# Patient Record
Sex: FEMALE | Race: WHITE | Employment: OTHER | URBAN - NONMETROPOLITAN AREA
[De-identification: names, ages, dates, MRNs, and addresses within clinical notes are randomized per-mention and may not be internally consistent; named-entity substitution may affect disease eponyms.]

---

## 2018-03-12 ENCOUNTER — OFFICE VISIT (OUTPATIENT)
Dept: PAIN MANAGEMENT | Age: 53
End: 2018-03-12
Payer: MEDICARE

## 2018-03-12 VITALS
HEIGHT: 63 IN | WEIGHT: 149 LBS | DIASTOLIC BLOOD PRESSURE: 90 MMHG | SYSTOLIC BLOOD PRESSURE: 132 MMHG | HEART RATE: 84 BPM | BODY MASS INDEX: 26.4 KG/M2

## 2018-03-12 DIAGNOSIS — M54.6 CHRONIC BILATERAL THORACIC BACK PAIN: ICD-10-CM

## 2018-03-12 DIAGNOSIS — G89.29 CHRONIC BILATERAL THORACIC BACK PAIN: ICD-10-CM

## 2018-03-12 DIAGNOSIS — M54.12 CERVICAL RADICULOPATHY: ICD-10-CM

## 2018-03-12 DIAGNOSIS — M79.18 MYOFASCIAL PAIN: ICD-10-CM

## 2018-03-12 DIAGNOSIS — M47.812 CERVICAL FACET SYNDROME: Primary | ICD-10-CM

## 2018-03-12 DIAGNOSIS — M47.816 LUMBAR FACET JOINT SYNDROME: ICD-10-CM

## 2018-03-12 PROCEDURE — G8419 CALC BMI OUT NRM PARAM NOF/U: HCPCS | Performed by: PHYSICAL MEDICINE & REHABILITATION

## 2018-03-12 PROCEDURE — 4004F PT TOBACCO SCREEN RCVD TLK: CPT | Performed by: PHYSICAL MEDICINE & REHABILITATION

## 2018-03-12 PROCEDURE — 99204 OFFICE O/P NEW MOD 45 MIN: CPT | Performed by: PHYSICAL MEDICINE & REHABILITATION

## 2018-03-12 PROCEDURE — 3017F COLORECTAL CA SCREEN DOC REV: CPT | Performed by: PHYSICAL MEDICINE & REHABILITATION

## 2018-03-12 PROCEDURE — G8484 FLU IMMUNIZE NO ADMIN: HCPCS | Performed by: PHYSICAL MEDICINE & REHABILITATION

## 2018-03-12 PROCEDURE — G8427 DOCREV CUR MEDS BY ELIG CLIN: HCPCS | Performed by: PHYSICAL MEDICINE & REHABILITATION

## 2018-03-12 PROCEDURE — 3014F SCREEN MAMMO DOC REV: CPT | Performed by: PHYSICAL MEDICINE & REHABILITATION

## 2018-03-12 RX ORDER — MELOXICAM 15 MG/1
15 TABLET ORAL
COMMUNITY
End: 2018-06-11

## 2018-03-12 RX ORDER — BUSPIRONE HYDROCHLORIDE 10 MG/1
15 TABLET ORAL 3 TIMES DAILY
COMMUNITY

## 2018-03-12 RX ORDER — TRAZODONE HYDROCHLORIDE 100 MG/1
100 TABLET ORAL
COMMUNITY
End: 2021-04-06

## 2018-03-12 RX ORDER — IBUPROFEN 200 MG
400 TABLET ORAL DAILY
COMMUNITY
End: 2018-06-11 | Stop reason: DRUGHIGH

## 2018-03-12 RX ORDER — AMITRIPTYLINE HYDROCHLORIDE 10 MG/1
10 TABLET, FILM COATED ORAL
COMMUNITY
Start: 2017-12-12 | End: 2018-05-15 | Stop reason: CLARIF

## 2018-03-12 RX ORDER — DESVENLAFAXINE 100 MG/1
100 TABLET, EXTENDED RELEASE ORAL
COMMUNITY
End: 2018-04-12

## 2018-03-12 RX ORDER — HYDROCODONE BITARTRATE AND ACETAMINOPHEN 5; 325 MG/1; MG/1
1 TABLET ORAL
COMMUNITY
Start: 2018-01-17 | End: 2018-04-12 | Stop reason: ALTCHOICE

## 2018-03-12 RX ORDER — TIZANIDINE 4 MG/1
4 TABLET ORAL
COMMUNITY
End: 2018-08-10

## 2018-03-21 ENCOUNTER — TELEPHONE (OUTPATIENT)
Dept: PAIN MANAGEMENT | Age: 53
End: 2018-03-21

## 2018-04-12 ENCOUNTER — OFFICE VISIT (OUTPATIENT)
Dept: PAIN MANAGEMENT | Age: 53
End: 2018-04-12
Payer: MEDICARE

## 2018-04-12 VITALS
HEART RATE: 88 BPM | WEIGHT: 150.8 LBS | HEIGHT: 63 IN | SYSTOLIC BLOOD PRESSURE: 136 MMHG | BODY MASS INDEX: 26.72 KG/M2 | DIASTOLIC BLOOD PRESSURE: 86 MMHG

## 2018-04-12 DIAGNOSIS — M79.7 FIBROMYALGIA: Primary | ICD-10-CM

## 2018-04-12 DIAGNOSIS — M79.18 MYOFASCIAL PAIN: ICD-10-CM

## 2018-04-12 DIAGNOSIS — G89.29 ENCOUNTER FOR CHRONIC PAIN MANAGEMENT: ICD-10-CM

## 2018-04-12 PROCEDURE — 3017F COLORECTAL CA SCREEN DOC REV: CPT | Performed by: NURSE PRACTITIONER

## 2018-04-12 PROCEDURE — G8419 CALC BMI OUT NRM PARAM NOF/U: HCPCS | Performed by: NURSE PRACTITIONER

## 2018-04-12 PROCEDURE — 4004F PT TOBACCO SCREEN RCVD TLK: CPT | Performed by: NURSE PRACTITIONER

## 2018-04-12 PROCEDURE — 3014F SCREEN MAMMO DOC REV: CPT | Performed by: NURSE PRACTITIONER

## 2018-04-12 PROCEDURE — 99214 OFFICE O/P EST MOD 30 MIN: CPT | Performed by: NURSE PRACTITIONER

## 2018-04-12 PROCEDURE — G8427 DOCREV CUR MEDS BY ELIG CLIN: HCPCS | Performed by: NURSE PRACTITIONER

## 2018-04-12 RX ORDER — DULOXETIN HYDROCHLORIDE 30 MG/1
30 CAPSULE, DELAYED RELEASE ORAL DAILY
Qty: 7 CAPSULE | Refills: 0 | Status: SHIPPED | OUTPATIENT
Start: 2018-04-12 | End: 2018-05-15 | Stop reason: DRUGHIGH

## 2018-04-12 RX ORDER — HYDROCODONE BITARTRATE AND ACETAMINOPHEN 5; 325 MG/1; MG/1
1 TABLET ORAL 2 TIMES DAILY PRN
Qty: 60 TABLET | Refills: 0 | Status: SHIPPED | OUTPATIENT
Start: 2018-04-12 | End: 2018-05-15 | Stop reason: SDUPTHER

## 2018-04-12 RX ORDER — DULOXETIN HYDROCHLORIDE 60 MG/1
60 CAPSULE, DELAYED RELEASE ORAL DAILY
Qty: 90 CAPSULE | Refills: 0 | Status: SHIPPED | OUTPATIENT
Start: 2018-04-12 | End: 2018-05-15

## 2018-04-12 ASSESSMENT — ENCOUNTER SYMPTOMS: RESPIRATORY NEGATIVE: 1

## 2018-04-17 ENCOUNTER — TELEPHONE (OUTPATIENT)
Dept: PAIN MANAGEMENT | Age: 53
End: 2018-04-17

## 2018-05-15 ENCOUNTER — OFFICE VISIT (OUTPATIENT)
Dept: PAIN MANAGEMENT | Age: 53
End: 2018-05-15
Payer: MEDICARE

## 2018-05-15 VITALS
HEIGHT: 63 IN | WEIGHT: 150.2 LBS | RESPIRATION RATE: 16 BRPM | SYSTOLIC BLOOD PRESSURE: 130 MMHG | BODY MASS INDEX: 26.61 KG/M2 | DIASTOLIC BLOOD PRESSURE: 80 MMHG

## 2018-05-15 DIAGNOSIS — M79.18 MYOFASCIAL PAIN: ICD-10-CM

## 2018-05-15 DIAGNOSIS — G89.29 ENCOUNTER FOR CHRONIC PAIN MANAGEMENT: ICD-10-CM

## 2018-05-15 DIAGNOSIS — M79.7 FIBROMYALGIA: Primary | ICD-10-CM

## 2018-05-15 PROCEDURE — G8431 POS CLIN DEPRES SCRN F/U DOC: HCPCS | Performed by: NURSE PRACTITIONER

## 2018-05-15 PROCEDURE — 4004F PT TOBACCO SCREEN RCVD TLK: CPT | Performed by: NURSE PRACTITIONER

## 2018-05-15 PROCEDURE — 3017F COLORECTAL CA SCREEN DOC REV: CPT | Performed by: NURSE PRACTITIONER

## 2018-05-15 PROCEDURE — G8419 CALC BMI OUT NRM PARAM NOF/U: HCPCS | Performed by: NURSE PRACTITIONER

## 2018-05-15 PROCEDURE — 99214 OFFICE O/P EST MOD 30 MIN: CPT | Performed by: NURSE PRACTITIONER

## 2018-05-15 PROCEDURE — G0444 DEPRESSION SCREEN ANNUAL: HCPCS | Performed by: NURSE PRACTITIONER

## 2018-05-15 PROCEDURE — G8428 CUR MEDS NOT DOCUMENT: HCPCS | Performed by: NURSE PRACTITIONER

## 2018-05-15 RX ORDER — POLYMYXIN B SULFATE AND TRIMETHOPRIM 1; 10000 MG/ML; [USP'U]/ML
SOLUTION OPHTHALMIC
COMMUNITY
Start: 2018-04-26 | End: 2018-06-04 | Stop reason: ALTCHOICE

## 2018-05-15 RX ORDER — HYDROCODONE BITARTRATE AND ACETAMINOPHEN 5; 325 MG/1; MG/1
1 TABLET ORAL EVERY 8 HOURS PRN
Qty: 90 TABLET | Refills: 0 | Status: SHIPPED | OUTPATIENT
Start: 2018-05-15 | End: 2018-06-11 | Stop reason: SDUPTHER

## 2018-05-15 RX ORDER — FLUTICASONE PROPIONATE 50 MCG
SPRAY, SUSPENSION (ML) NASAL
COMMUNITY
Start: 2018-04-26 | End: 2018-08-10 | Stop reason: ALTCHOICE

## 2018-05-15 RX ORDER — DULOXETIN HYDROCHLORIDE 30 MG/1
30 CAPSULE, DELAYED RELEASE ORAL DAILY
Qty: 14 CAPSULE | Refills: 0 | Status: SHIPPED | OUTPATIENT
Start: 2018-05-15 | End: 2018-06-04 | Stop reason: ALTCHOICE

## 2018-05-15 RX ORDER — ACETAMINOPHEN 500 MG
1000 TABLET ORAL
COMMUNITY
End: 2018-08-10 | Stop reason: ALTCHOICE

## 2018-05-15 ASSESSMENT — ENCOUNTER SYMPTOMS: RESPIRATORY NEGATIVE: 1

## 2018-05-15 ASSESSMENT — PATIENT HEALTH QUESTIONNAIRE - PHQ9
4. FEELING TIRED OR HAVING LITTLE ENERGY: 3
SUM OF ALL RESPONSES TO PHQ QUESTIONS 1-9: 15
SUM OF ALL RESPONSES TO PHQ9 QUESTIONS 1 & 2: 5
8. MOVING OR SPEAKING SO SLOWLY THAT OTHER PEOPLE COULD HAVE NOTICED. OR THE OPPOSITE, BEING SO FIGETY OR RESTLESS THAT YOU HAVE BEEN MOVING AROUND A LOT MORE THAN USUAL: 0
10. IF YOU CHECKED OFF ANY PROBLEMS, HOW DIFFICULT HAVE THESE PROBLEMS MADE IT FOR YOU TO DO YOUR WORK, TAKE CARE OF THINGS AT HOME, OR GET ALONG WITH OTHER PEOPLE: 0
7. TROUBLE CONCENTRATING ON THINGS, SUCH AS READING THE NEWSPAPER OR WATCHING TELEVISION: 3
5. POOR APPETITE OR OVEREATING: 1
6. FEELING BAD ABOUT YOURSELF - OR THAT YOU ARE A FAILURE OR HAVE LET YOURSELF OR YOUR FAMILY DOWN: 0
1. LITTLE INTEREST OR PLEASURE IN DOING THINGS: 2
3. TROUBLE FALLING OR STAYING ASLEEP: 3
9. THOUGHTS THAT YOU WOULD BE BETTER OFF DEAD, OR OF HURTING YOURSELF: 0
2. FEELING DOWN, DEPRESSED OR HOPELESS: 3

## 2018-06-04 ENCOUNTER — PROCEDURE VISIT (OUTPATIENT)
Dept: PAIN MANAGEMENT | Age: 53
End: 2018-06-04
Payer: MEDICARE

## 2018-06-04 VITALS
WEIGHT: 151.6 LBS | RESPIRATION RATE: 14 BRPM | BODY MASS INDEX: 26.86 KG/M2 | HEIGHT: 63 IN | DIASTOLIC BLOOD PRESSURE: 80 MMHG | SYSTOLIC BLOOD PRESSURE: 130 MMHG

## 2018-06-04 DIAGNOSIS — M79.18 MYOFASCIAL PAIN: Primary | ICD-10-CM

## 2018-06-04 PROCEDURE — 20553 NJX 1/MLT TRIGGER POINTS 3/>: CPT | Performed by: NURSE PRACTITIONER

## 2018-06-04 RX ORDER — DESVENLAFAXINE 100 MG/1
100 TABLET, EXTENDED RELEASE ORAL DAILY
COMMUNITY
Start: 2018-05-29

## 2018-06-11 ENCOUNTER — OFFICE VISIT (OUTPATIENT)
Dept: PAIN MANAGEMENT | Age: 53
End: 2018-06-11
Payer: MEDICARE

## 2018-06-11 VITALS
BODY MASS INDEX: 26.15 KG/M2 | RESPIRATION RATE: 14 BRPM | DIASTOLIC BLOOD PRESSURE: 100 MMHG | WEIGHT: 147.6 LBS | HEIGHT: 63 IN | SYSTOLIC BLOOD PRESSURE: 126 MMHG

## 2018-06-11 DIAGNOSIS — G89.29 ENCOUNTER FOR CHRONIC PAIN MANAGEMENT: ICD-10-CM

## 2018-06-11 DIAGNOSIS — M79.7 FIBROMYALGIA: ICD-10-CM

## 2018-06-11 DIAGNOSIS — M79.18 MYOFASCIAL PAIN: ICD-10-CM

## 2018-06-11 PROCEDURE — G8427 DOCREV CUR MEDS BY ELIG CLIN: HCPCS | Performed by: NURSE PRACTITIONER

## 2018-06-11 PROCEDURE — G8419 CALC BMI OUT NRM PARAM NOF/U: HCPCS | Performed by: NURSE PRACTITIONER

## 2018-06-11 PROCEDURE — 4004F PT TOBACCO SCREEN RCVD TLK: CPT | Performed by: NURSE PRACTITIONER

## 2018-06-11 PROCEDURE — 3017F COLORECTAL CA SCREEN DOC REV: CPT | Performed by: NURSE PRACTITIONER

## 2018-06-11 PROCEDURE — 99213 OFFICE O/P EST LOW 20 MIN: CPT | Performed by: NURSE PRACTITIONER

## 2018-06-11 RX ORDER — HYDROCODONE BITARTRATE AND ACETAMINOPHEN 5; 325 MG/1; MG/1
1 TABLET ORAL EVERY 8 HOURS PRN
Qty: 90 TABLET | Refills: 0 | Status: SHIPPED | OUTPATIENT
Start: 2018-06-14 | End: 2018-07-09 | Stop reason: SDUPTHER

## 2018-06-11 RX ORDER — IBUPROFEN 800 MG/1
800 TABLET ORAL EVERY 8 HOURS PRN
Qty: 90 TABLET | Refills: 5 | Status: SHIPPED | OUTPATIENT
Start: 2018-06-11 | End: 2018-12-03 | Stop reason: SDUPTHER

## 2018-06-11 ASSESSMENT — ENCOUNTER SYMPTOMS: RESPIRATORY NEGATIVE: 1

## 2018-07-09 DIAGNOSIS — M79.7 FIBROMYALGIA: ICD-10-CM

## 2018-07-09 DIAGNOSIS — G89.29 ENCOUNTER FOR CHRONIC PAIN MANAGEMENT: ICD-10-CM

## 2018-07-09 DIAGNOSIS — M79.18 MYOFASCIAL PAIN: ICD-10-CM

## 2018-07-14 RX ORDER — HYDROCODONE BITARTRATE AND ACETAMINOPHEN 5; 325 MG/1; MG/1
1 TABLET ORAL EVERY 8 HOURS PRN
Qty: 90 TABLET | Refills: 0 | Status: SHIPPED | OUTPATIENT
Start: 2018-07-14 | End: 2018-08-10 | Stop reason: SDUPTHER

## 2018-08-10 ENCOUNTER — HOSPITAL ENCOUNTER (OUTPATIENT)
Age: 53
Setting detail: SPECIMEN
Discharge: HOME | End: 2018-08-10
Payer: MEDICARE

## 2018-08-10 ENCOUNTER — OFFICE VISIT (OUTPATIENT)
Dept: PAIN MANAGEMENT | Age: 53
End: 2018-08-10
Payer: MEDICARE

## 2018-08-10 VITALS
WEIGHT: 151 LBS | HEART RATE: 56 BPM | HEIGHT: 63 IN | DIASTOLIC BLOOD PRESSURE: 86 MMHG | SYSTOLIC BLOOD PRESSURE: 132 MMHG | BODY MASS INDEX: 26.75 KG/M2

## 2018-08-10 DIAGNOSIS — Z79.891 ENCOUNTER FOR LONG-TERM OPIATE ANALGESIC USE: Primary | ICD-10-CM

## 2018-08-10 DIAGNOSIS — G89.29 ENCOUNTER FOR CHRONIC PAIN MANAGEMENT: ICD-10-CM

## 2018-08-10 DIAGNOSIS — M25.50 CHRONIC PAIN OF MULTIPLE JOINTS: ICD-10-CM

## 2018-08-10 DIAGNOSIS — G89.29 CHRONIC PAIN OF MULTIPLE JOINTS: ICD-10-CM

## 2018-08-10 DIAGNOSIS — M54.2 CERVICALGIA: ICD-10-CM

## 2018-08-10 DIAGNOSIS — M79.7 FIBROMYALGIA: ICD-10-CM

## 2018-08-10 DIAGNOSIS — M79.18 MYOFASCIAL PAIN: ICD-10-CM

## 2018-08-10 DIAGNOSIS — Z02.83 ENCOUNTER FOR DRUG SCREENING: ICD-10-CM

## 2018-08-10 PROCEDURE — G8427 DOCREV CUR MEDS BY ELIG CLIN: HCPCS | Performed by: NURSE PRACTITIONER

## 2018-08-10 PROCEDURE — G8419 CALC BMI OUT NRM PARAM NOF/U: HCPCS | Performed by: NURSE PRACTITIONER

## 2018-08-10 PROCEDURE — 4004F PT TOBACCO SCREEN RCVD TLK: CPT | Performed by: NURSE PRACTITIONER

## 2018-08-10 PROCEDURE — 99214 OFFICE O/P EST MOD 30 MIN: CPT | Performed by: NURSE PRACTITIONER

## 2018-08-10 PROCEDURE — 80307 DRUG TEST PRSMV CHEM ANLYZR: CPT

## 2018-08-10 PROCEDURE — 3017F COLORECTAL CA SCREEN DOC REV: CPT | Performed by: NURSE PRACTITIONER

## 2018-08-10 RX ORDER — BACLOFEN 10 MG/1
10 TABLET ORAL 3 TIMES DAILY
Qty: 90 TABLET | Refills: 5 | Status: SHIPPED | OUTPATIENT
Start: 2018-08-10 | End: 2018-09-10

## 2018-08-10 RX ORDER — HYDROCODONE BITARTRATE AND ACETAMINOPHEN 5; 325 MG/1; MG/1
1 TABLET ORAL EVERY 8 HOURS PRN
Qty: 90 TABLET | Refills: 0 | Status: SHIPPED | OUTPATIENT
Start: 2018-08-10 | End: 2018-09-09

## 2018-08-10 ASSESSMENT — ENCOUNTER SYMPTOMS
RESPIRATORY NEGATIVE: 1
BACK PAIN: 1

## 2018-08-10 NOTE — PATIENT INSTRUCTIONS
Pain Management Plan:  1. Stop Zanaflex, start baclofen 10mg 3x's daily as needed    Pain clinic phone numbers  Refills  - Call [phone]. Please leave your name, a working phone number, date of birth, the name, dose, quantity, and last refill date of the prescription, and the pharmacy. Medication or Procedure Questions - Call [phone]. This is the direct line to the [facility] Pain Management Nurses. Appointment or General Questions - Call  [phone]. This is the direct line the  [address] [place] [address] can also use Pro[employer]. Is your [employer]art Activated? How to dispose of unused pain medications safely:  · Flush all unused pain medications. This is the FDA's recommended way of disposing these medications. · All other medications can be disposed in the trash mixed in undesirable contents (used coffee grounds, used [employer], etc).

## 2018-08-10 NOTE — PROGRESS NOTES
SECTION      times 2    ENDOMETRIAL ABLATION  2012    HAND SURGERY Left 2017    SHOULDER SURGERY Right 2014       History reviewed. No pertinent family history. Social History     Social History    Marital status: Single     Spouse name: N/A    Number of children: N/A    Years of education: N/A     Social History Main Topics    Smoking status: Current Every Day Smoker     Packs/day: 0.75     Types: Cigarettes    Smokeless tobacco: Never Used    Alcohol use 2.4 oz/week     4 Shots of liquor per week    Drug use: No    Sexual activity: Yes     Partners: Male     Other Topics Concern    None     Social History Narrative    None     Review of Systems   Constitutional: Positive for activity change (limited) and fatigue. Respiratory: Negative. Cardiovascular: Negative. Genitourinary: Negative. Musculoskeletal: Positive for back pain and myalgias. Neurological: Negative for weakness and numbness. Psychiatric/Behavioral: Positive for sleep disturbance. History anxiety     Objective:   Physical Exam   Constitutional: She is oriented to person, place, and time. Vital signs are normal. She appears well-developed and well-nourished. She is active and cooperative. Non-toxic appearance. She does not have a sickly appearance. She does not appear ill. No distress. HENT:   Head: Normocephalic and atraumatic. Cardiovascular: Normal rate. Pulmonary/Chest: Effort normal. No accessory muscle usage. No apnea. No respiratory distress. No audible wheezing   Neurological: She is alert and oriented to person, place, and time. She is not disoriented. No cranial nerve deficit. GCS eye subscore is 4. GCS verbal subscore is 5. GCS motor subscore is 6. Skin: Skin is warm, dry and intact. She is not diaphoretic. No cyanosis. No pallor. Nails show no clubbing. Psychiatric: She has a normal mood and affect.  Her behavior is normal. Judgment normal. She is not agitated, not aggressive and not

## 2018-08-13 DIAGNOSIS — Z02.83 ENCOUNTER FOR DRUG SCREENING: ICD-10-CM

## 2018-08-13 DIAGNOSIS — Z79.891 ENCOUNTER FOR LONG-TERM OPIATE ANALGESIC USE: ICD-10-CM

## 2018-08-16 LAB
6-ACETYLMORPHINE, UR: NOT DETECTED
7-AMINOCLONAZEPAM, URINE: NOT DETECTED
ALPHA-OH-ALPRAZ, URINE: NOT DETECTED
ALPRAZOLAM, URINE: NOT DETECTED
AMPHETAMINES, URINE: NOT DETECTED
BARBITURATES, URINE: NOT DETECTED
BENZOYLECGONINE, UR: NOT DETECTED
BUPRENORPHINE URINE: NOT DETECTED
CARISOPRODOL, UR: NOT DETECTED
CLONAZEPAM, URINE: NOT DETECTED
CODEINE, URINE: NOT DETECTED
CREATININE URINE: 101.8 MG/DL (ref 20–400)
DIAZEPAM, URINE: NOT DETECTED
EER PAIN MGT DRUG PANEL, HIGH RES/EMIT U: NORMAL
ETHYL GLUCURONIDE UR: PRESENT
FENTANYL URINE: NOT DETECTED
HYDROCODONE, URINE: PRESENT
HYDROMORPHONE, URINE: NOT DETECTED
LORAZEPAM, URINE: NOT DETECTED
MARIJUANA METAB, UR: NOT DETECTED
MDA, UR: NOT DETECTED
MDEA, EVE, UR: NOT DETECTED
MDMA URINE: NOT DETECTED
MEPERIDINE METAB, UR: NOT DETECTED
METHADONE, URINE: NOT DETECTED
METHAMPHETAMINE, URINE: NOT DETECTED
METHYLPHENIDATE: NOT DETECTED
MIDAZOLAM, URINE: NOT DETECTED
MORPHINE URINE: NOT DETECTED
NORBUPRENORPHINE, URINE: NOT DETECTED
NORDIAZEPAM, URINE: NOT DETECTED
NORFENTANYL, URINE: NOT DETECTED
NORHYDROCODONE, URINE: PRESENT
NOROXYCODONE, URINE: PRESENT
NOROXYMORPHONE, URINE: NOT DETECTED
OXAZEPAM, URINE: NOT DETECTED
OXYCODONE URINE: PRESENT
OXYMORPHONE, URINE: NOT DETECTED
PAIN MGT DRUG PANEL, HI RES, UR: NORMAL
PCP,URINE: NOT DETECTED
PHENTERMINE, UR: NOT DETECTED
PROPOXYPHENE, URINE: NOT DETECTED
TAPENTADOL, URINE: NOT DETECTED
TAPENTADOL-O-SULFATE, URINE: NOT DETECTED
TEMAZEPAM, URINE: NOT DETECTED
TRAMADOL, URINE: NOT DETECTED
ZOLPIDEM, URINE: NOT DETECTED

## 2018-08-27 ENCOUNTER — HOSPITAL ENCOUNTER (OUTPATIENT)
Dept: LAB | Age: 53
Setting detail: SPECIMEN
Discharge: HOME | End: 2018-08-27
Payer: MEDICARE

## 2018-08-27 ENCOUNTER — PROCEDURE VISIT (OUTPATIENT)
Dept: PAIN MANAGEMENT | Age: 53
End: 2018-08-27
Payer: MEDICARE

## 2018-08-27 VITALS
HEIGHT: 63 IN | RESPIRATION RATE: 14 BRPM | BODY MASS INDEX: 27 KG/M2 | WEIGHT: 152.4 LBS | SYSTOLIC BLOOD PRESSURE: 128 MMHG | DIASTOLIC BLOOD PRESSURE: 80 MMHG

## 2018-08-27 DIAGNOSIS — M54.50 BILATERAL LOW BACK PAIN WITHOUT SCIATICA, UNSPECIFIED CHRONICITY: ICD-10-CM

## 2018-08-27 DIAGNOSIS — M79.7 FIBROMYALGIA: ICD-10-CM

## 2018-08-27 DIAGNOSIS — G89.29 OTHER CHRONIC PAIN: ICD-10-CM

## 2018-08-27 DIAGNOSIS — G89.29 CHRONIC PAIN OF MULTIPLE JOINTS: ICD-10-CM

## 2018-08-27 DIAGNOSIS — M25.50 CHRONIC PAIN OF MULTIPLE JOINTS: ICD-10-CM

## 2018-08-27 DIAGNOSIS — M79.18 MYOFASCIAL PAIN: Primary | ICD-10-CM

## 2018-08-27 LAB
C-REACTIVE PROTEIN: 1.7 MG/L (ref 0–5)
RHEUMATOID FACTOR: <10 IU/ML
SEDIMENTATION RATE, ERYTHROCYTE: 5 MM (ref 0–30)

## 2018-08-27 PROCEDURE — 85651 RBC SED RATE NONAUTOMATED: CPT

## 2018-08-27 PROCEDURE — 36415 COLL VENOUS BLD VENIPUNCTURE: CPT

## 2018-08-27 PROCEDURE — G8427 DOCREV CUR MEDS BY ELIG CLIN: HCPCS | Performed by: NURSE PRACTITIONER

## 2018-08-27 PROCEDURE — 4004F PT TOBACCO SCREEN RCVD TLK: CPT | Performed by: NURSE PRACTITIONER

## 2018-08-27 PROCEDURE — 86140 C-REACTIVE PROTEIN: CPT

## 2018-08-27 PROCEDURE — 86431 RHEUMATOID FACTOR QUANT: CPT

## 2018-08-27 PROCEDURE — 3017F COLORECTAL CA SCREEN DOC REV: CPT | Performed by: NURSE PRACTITIONER

## 2018-08-27 PROCEDURE — G8419 CALC BMI OUT NRM PARAM NOF/U: HCPCS | Performed by: NURSE PRACTITIONER

## 2018-08-27 PROCEDURE — 99214 OFFICE O/P EST MOD 30 MIN: CPT | Performed by: NURSE PRACTITIONER

## 2018-08-27 PROCEDURE — 20553 NJX 1/MLT TRIGGER POINTS 3/>: CPT | Performed by: NURSE PRACTITIONER

## 2018-08-27 NOTE — PROGRESS NOTES
Subjective:      Patient ID: Betty Trinidad is a 45 y.o. female. Chief Complaint   Patient presents with    Injections     patient would like to have trigger points, last TPI's were 2018    Chronic Pain    Other     new pain since last time she was here     HPI Here today for routine pain clinic recheck, would like TPI's. Increased muscle spasms, multiple joint pain. Unsure if baclofen has helped provide any relief. Norco not providing relief    Pain Assessment  Location of Pain: Other (Comment) (fibromyalgia)  Severity of Pain: 8  Quality of Pain: Aching (spasms, burning, )  Duration of Pain: Persistent  Frequency of Pain: Constant  Aggravating Factors: Bending, Stretching, Straightening, Exercise, Kneeling, Squatting, Standing, Walking, Stairs (weather)  Limiting Behavior: Yes  Relieving Factors: Rest  Result of Injury: No  Work-Related Injury: No  Are there other pain locations you wish to document?: No    No Known Allergies    No outpatient prescriptions have been marked as taking for the 18 encounter (Procedure visit) with JACKLYN Schultz CNP. Past Medical History:   Diagnosis Date    Anxiety     Arthritis     CMC (carpometacarpal joint) dislocation, left, sequela     DJD (degenerative joint disease) of cervical spine     Fibromyalgia        Past Surgical History:   Procedure Laterality Date    BREAST SURGERY  2002     SECTION      times 2    ENDOMETRIAL ABLATION  2012    HAND SURGERY Left 2017    SHOULDER SURGERY Right 2014       No family history on file.     Social History     Social History    Marital status: Single     Spouse name: N/A    Number of children: N/A    Years of education: N/A     Social History Main Topics    Smoking status: Current Every Day Smoker     Packs/day: 0.75     Types: Cigarettes    Smokeless tobacco: Never Used    Alcohol use 2.4 oz/week     4 Shots of liquor per week    Drug use: No    Sexual activity: Yes     Partners: Male     Other Topics Concern    None     Social History Narrative    None     Review of Systems   Constitutional: Positive for activity change (limited) and fatigue. Respiratory: Negative. Cardiovascular: Negative. Genitourinary: Negative. Musculoskeletal: Positive for back pain and myalgias. Neurological: Negative for weakness and numbness. Psychiatric/Behavioral: Positive for sleep disturbance. History anxiety       Objective:   Physical Exam   Constitutional: She is oriented to person, place, and time. Vital signs are normal. She appears well-developed and well-nourished. She is active and cooperative. Non-toxic appearance. She does not have a sickly appearance. She does not appear ill. No distress. HENT:   Head: Normocephalic and atraumatic. Cardiovascular: Normal rate. Pulmonary/Chest: Effort normal. No accessory muscle usage. No apnea. No respiratory distress. No audible wheezing   Musculoskeletal:        Back:    Neurological: She is alert and oriented to person, place, and time. She is not disoriented. No cranial nerve deficit. GCS eye subscore is 4. GCS verbal subscore is 5. GCS motor subscore is 6. Skin: Skin is warm, dry and intact. She is not diaphoretic. No cyanosis. No pallor. Nails show no clubbing. Psychiatric: She has a normal mood and affect. Her behavior is normal. Judgment normal. She is not agitated, not aggressive and not withdrawn. She does not express impulsivity or inappropriate judgment. She is communicative. Vitals reviewed. Assessment:      1. Myofascial pain    2. Fibromyalgia    3. Chronic pain of multiple joints    4. Bilateral low back pain without sciatica, unspecified chronicity    5.  Other chronic pain          Plan:      Rheumatoid panel, ESR and CRP  TPI's, please see separate note  Follow up 1-2 months        Julia Rubinstein, APRN - CNP

## 2018-08-27 NOTE — PATIENT INSTRUCTIONS
Pain Management Plan:  1. Continue current pain medications to improve quality of life and function. 2. Blood work today in lab    Pain clinic phone numbers  Refills  - Call [phone]. Please leave your name, a working phone number, date of birth, the name, dose, quantity, and last refill date of the prescription, and the pharmacy. Medication or Procedure Questions - Call [phone]. This is the direct line to the [facility] Pain Management Nurses. Appointment or General Questions - Call  [phone]. This is the direct line the  [address] [place] [address] can also use [employer]. Is your MyChart Activated? How to dispose of unused pain medications safely:  · Flush all unused pain medications. This is the FDA's recommended way of disposing these medications. · All other medications can be disposed in the trash mixed in undesirable contents (used coffee grounds, used [employer], etc).

## 2018-08-28 RX ORDER — TRIAMCINOLONE ACETONIDE 40 MG/ML
40 INJECTION, SUSPENSION INTRA-ARTICULAR; INTRAMUSCULAR ONCE
Status: COMPLETED | OUTPATIENT
Start: 2018-08-28 | End: 2018-08-28

## 2018-08-28 RX ADMIN — TRIAMCINOLONE ACETONIDE 40 MG: 40 INJECTION, SUSPENSION INTRA-ARTICULAR; INTRAMUSCULAR at 11:23

## 2018-08-28 ASSESSMENT — ENCOUNTER SYMPTOMS
RESPIRATORY NEGATIVE: 1
BACK PAIN: 1

## 2018-09-10 ENCOUNTER — OFFICE VISIT (OUTPATIENT)
Dept: PAIN MANAGEMENT | Age: 53
End: 2018-09-10
Payer: MEDICARE

## 2018-09-10 VITALS
BODY MASS INDEX: 27.64 KG/M2 | HEART RATE: 68 BPM | DIASTOLIC BLOOD PRESSURE: 80 MMHG | SYSTOLIC BLOOD PRESSURE: 130 MMHG | WEIGHT: 156 LBS | HEIGHT: 63 IN

## 2018-09-10 DIAGNOSIS — M25.50 CHRONIC PAIN OF MULTIPLE JOINTS: ICD-10-CM

## 2018-09-10 DIAGNOSIS — M79.18 MYOFASCIAL PAIN: ICD-10-CM

## 2018-09-10 DIAGNOSIS — M79.7 FIBROMYALGIA: Primary | ICD-10-CM

## 2018-09-10 DIAGNOSIS — G89.29 ENCOUNTER FOR CHRONIC PAIN MANAGEMENT: ICD-10-CM

## 2018-09-10 DIAGNOSIS — G89.29 CHRONIC PAIN OF MULTIPLE JOINTS: ICD-10-CM

## 2018-09-10 PROCEDURE — G8419 CALC BMI OUT NRM PARAM NOF/U: HCPCS | Performed by: NURSE PRACTITIONER

## 2018-09-10 PROCEDURE — 99214 OFFICE O/P EST MOD 30 MIN: CPT | Performed by: NURSE PRACTITIONER

## 2018-09-10 PROCEDURE — 3017F COLORECTAL CA SCREEN DOC REV: CPT | Performed by: NURSE PRACTITIONER

## 2018-09-10 PROCEDURE — 4004F PT TOBACCO SCREEN RCVD TLK: CPT | Performed by: NURSE PRACTITIONER

## 2018-09-10 PROCEDURE — G8427 DOCREV CUR MEDS BY ELIG CLIN: HCPCS | Performed by: NURSE PRACTITIONER

## 2018-09-10 RX ORDER — TIZANIDINE 4 MG/1
4 TABLET ORAL 3 TIMES DAILY
Qty: 90 TABLET | Refills: 5 | Status: SHIPPED | OUTPATIENT
Start: 2018-09-10 | End: 2019-04-08 | Stop reason: SDUPTHER

## 2018-09-10 RX ORDER — HYDROCODONE BITARTRATE AND ACETAMINOPHEN 7.5; 325 MG/1; MG/1
1 TABLET ORAL EVERY 8 HOURS PRN
Qty: 90 TABLET | Refills: 0 | Status: SHIPPED | OUTPATIENT
Start: 2018-09-10 | End: 2018-10-10 | Stop reason: SDUPTHER

## 2018-09-10 ASSESSMENT — ENCOUNTER SYMPTOMS
RESPIRATORY NEGATIVE: 1
BACK PAIN: 1

## 2018-09-10 NOTE — PATIENT INSTRUCTIONS
Pain Management Plan:  1. Increase Norco 7.5/325 3x's per day as needed  Stop baclofen, go back to Counts include 234 beds at the [facility]    Pain clinic phone numbers  Refills  - Call [phone]. Please leave your name, a working phone number, date of birth, the name, dose, quantity, and last refill date of the prescription, and the pharmacy. Medication or Procedure Questions - Call [phone]. This is the direct line to the [facility] Pain Management Nurses. Appointment or General Questions - Call  [phone]. This is the direct line the  [address] [place] [address] can also use [employer]oom[employer]. Is your MyChart Activated? How to dispose of unused pain medications safely:  · Flush all unused pain medications. This is the FDA's recommended way of disposing these medications. · All other medications can be disposed in the trash mixed in undesirable contents (used coffee grounds, used [employer], etc).

## 2018-09-10 NOTE — PROGRESS NOTES
Subjective:      Patient ID: Dipti Booker is a 45 y.o. female. Chief Complaint   Patient presents with    Neck Pain     2 week med check     HPI Here today for routine pain clinic recheck. Trigger point injections did provide some relief, Norco only taking the edge off, has been on for over two years ago. Previously taking 5 per day. No relief from baclofen, thinks it may have made his pain worse. Pain Assessment  Location of Pain: Neck  Location Modifiers: Left, Right (neck and shoulder pain)  Severity of Pain: 7  Quality of Pain: Throbbing, Sharp, Dull, Aching  Duration of Pain: Persistent  Frequency of Pain: Constant  Aggravating Factors: Bending, Stretching, Standing, Walking  Limiting Behavior: Yes  Relieving Factors: Rest, Ice, Heat, Exercise, Nsaids  Are there other pain locations you wish to document?: No    No Known Allergies    Outpatient Prescriptions Marked as Taking for the 9/10/18 encounter (Office Visit) with JACKLYN Carrera CNP   Medication Sig Dispense Refill    HYDROcodone-acetaminophen (NORCO) 7.5-325 MG per tablet Take 1 tablet by mouth every 8 hours as needed for Pain for up to 30 days. . 90 tablet 0    baclofen (LIORESAL) 10 MG tablet Take 1 tablet by mouth 3 times daily 90 tablet 5    ibuprofen (ADVIL;MOTRIN) 800 MG tablet Take 1 tablet by mouth every 8 hours as needed for Pain 90 tablet 5    desvenlafaxine succinate (PRISTIQ) 100 MG TB24 extended release tablet Take 100 mg by mouth daily      busPIRone (BUSPAR) 10 MG tablet Take 10 mg by mouth      traZODone (DESYREL) 100 MG tablet Take 100 mg by mouth         Past Medical History:   Diagnosis Date    Anxiety     Arthritis     CMC (carpometacarpal joint) dislocation, left, sequela     DJD (degenerative joint disease) of cervical spine     Fibromyalgia        Past Surgical History:   Procedure Laterality Date    BREAST SURGERY  2002     SECTION      times 2    ENDOMETRIAL ABLATION  2012    HAND

## 2018-10-10 ENCOUNTER — OFFICE VISIT (OUTPATIENT)
Dept: PAIN MANAGEMENT | Age: 53
End: 2018-10-10
Payer: MEDICARE

## 2018-10-10 VITALS
RESPIRATION RATE: 16 BRPM | HEIGHT: 63 IN | SYSTOLIC BLOOD PRESSURE: 130 MMHG | WEIGHT: 151.4 LBS | DIASTOLIC BLOOD PRESSURE: 84 MMHG | BODY MASS INDEX: 26.82 KG/M2

## 2018-10-10 DIAGNOSIS — G89.29 ENCOUNTER FOR CHRONIC PAIN MANAGEMENT: Primary | ICD-10-CM

## 2018-10-10 DIAGNOSIS — G89.29 CHRONIC PAIN OF MULTIPLE JOINTS: ICD-10-CM

## 2018-10-10 DIAGNOSIS — M79.18 MYOFASCIAL PAIN: ICD-10-CM

## 2018-10-10 DIAGNOSIS — M79.7 FIBROMYALGIA: ICD-10-CM

## 2018-10-10 DIAGNOSIS — M25.50 CHRONIC PAIN OF MULTIPLE JOINTS: ICD-10-CM

## 2018-10-10 PROCEDURE — G8419 CALC BMI OUT NRM PARAM NOF/U: HCPCS | Performed by: NURSE PRACTITIONER

## 2018-10-10 PROCEDURE — 99214 OFFICE O/P EST MOD 30 MIN: CPT | Performed by: NURSE PRACTITIONER

## 2018-10-10 PROCEDURE — 3017F COLORECTAL CA SCREEN DOC REV: CPT | Performed by: NURSE PRACTITIONER

## 2018-10-10 PROCEDURE — G8427 DOCREV CUR MEDS BY ELIG CLIN: HCPCS | Performed by: NURSE PRACTITIONER

## 2018-10-10 PROCEDURE — G8484 FLU IMMUNIZE NO ADMIN: HCPCS | Performed by: NURSE PRACTITIONER

## 2018-10-10 PROCEDURE — 4004F PT TOBACCO SCREEN RCVD TLK: CPT | Performed by: NURSE PRACTITIONER

## 2018-10-10 RX ORDER — HYDROCODONE BITARTRATE AND ACETAMINOPHEN 7.5; 325 MG/1; MG/1
1 TABLET ORAL EVERY 8 HOURS PRN
Qty: 90 TABLET | Refills: 0 | Status: SHIPPED | OUTPATIENT
Start: 2018-10-10 | End: 2018-11-08 | Stop reason: SDUPTHER

## 2018-10-10 ASSESSMENT — ENCOUNTER SYMPTOMS
RESPIRATORY NEGATIVE: 1
BACK PAIN: 1

## 2018-10-10 NOTE — PROGRESS NOTES
Male     Other Topics Concern    None     Social History Narrative    None     Review of Systems   Constitutional: Positive for activity change (limited) and fatigue. Respiratory: Negative. Cardiovascular: Negative. Genitourinary: Negative. Musculoskeletal: Positive for back pain and myalgias. Neurological: Negative for weakness and numbness. Psychiatric/Behavioral: Positive for sleep disturbance. History anxiety       Objective:   Physical Exam   Constitutional: She is oriented to person, place, and time. Vital signs are normal. She appears well-developed and well-nourished. She is active and cooperative. Non-toxic appearance. She does not have a sickly appearance. She does not appear ill. No distress. HENT:   Head: Normocephalic and atraumatic. Cardiovascular: Normal rate. Pulmonary/Chest: Effort normal. No accessory muscle usage. No apnea. No respiratory distress. No audible wheezing   Musculoskeletal:        Back:    Neurological: She is alert and oriented to person, place, and time. She is not disoriented. No cranial nerve deficit. GCS eye subscore is 4. GCS verbal subscore is 5. GCS motor subscore is 6. Skin: Skin is warm, dry and intact. She is not diaphoretic. No cyanosis. No pallor. Nails show no clubbing. Psychiatric: She has a normal mood and affect. Her behavior is normal. Judgment normal. She is not agitated, not aggressive and not withdrawn. She does not express impulsivity or inappropriate judgment. She is communicative. Vitals reviewed. Assessment:      1. Encounter for chronic pain management    2. Fibromyalgia    3. Myofascial pain    4. Chronic pain of multiple joints          Plan:     Current medications working well for pain control, will Continue current pain medications to improve quality of life and function. Schedule trigger point injections in two weeks. Consider chiropractic care.     Controlled Substances Monitoring:     RX Monitoring 9/10/2018 Attestation The Prescription Monitoring Report for this patient was reviewed today. Documentation No signs of potential drug abuse or diversion identified.    Medication Contracts -           JACKLYN Delacruz - CNP

## 2018-10-10 NOTE — PATIENT INSTRUCTIONS
Pain Management Plan:  1. Continue current pain medications to improve quality of life and function. Pain clinic phone numbers  Refills  - Call [phone]. Please leave your name, a working phone number, date of birth, the name, dose, quantity, and last refill date of the prescription, and the pharmacy. Medication or Procedure Questions - Call [phone]. This is the direct line to the [facility] Pain Management Nurses. Appointment or General Questions - Call  [phone]. This is the direct line the  [address] [place] [address] can also use [employer]. Is your [employer] Activated? How to dispose of unused pain medications safely:  · Flush all unused pain medications. This is the FDA's recommended way of disposing these medications. · All other medications can be disposed in the trash mixed in undesirable contents (used coffee grounds, used [employer], etc).

## 2018-10-24 ENCOUNTER — PROCEDURE VISIT (OUTPATIENT)
Dept: PAIN MANAGEMENT | Age: 53
End: 2018-10-24
Payer: MEDICARE

## 2018-10-24 VITALS
BODY MASS INDEX: 27.29 KG/M2 | DIASTOLIC BLOOD PRESSURE: 88 MMHG | HEIGHT: 63 IN | SYSTOLIC BLOOD PRESSURE: 130 MMHG | WEIGHT: 154 LBS | HEART RATE: 76 BPM

## 2018-10-24 DIAGNOSIS — M79.18 MYOFASCIAL PAIN: Primary | ICD-10-CM

## 2018-10-24 PROCEDURE — 20553 NJX 1/MLT TRIGGER POINTS 3/>: CPT | Performed by: NURSE PRACTITIONER

## 2018-11-08 ENCOUNTER — OFFICE VISIT (OUTPATIENT)
Dept: PAIN MANAGEMENT | Age: 53
End: 2018-11-08
Payer: MEDICARE

## 2018-11-08 VITALS
DIASTOLIC BLOOD PRESSURE: 80 MMHG | RESPIRATION RATE: 18 BRPM | BODY MASS INDEX: 26.9 KG/M2 | SYSTOLIC BLOOD PRESSURE: 154 MMHG | HEIGHT: 63 IN | WEIGHT: 151.8 LBS

## 2018-11-08 DIAGNOSIS — G89.29 ENCOUNTER FOR CHRONIC PAIN MANAGEMENT: Primary | ICD-10-CM

## 2018-11-08 DIAGNOSIS — M79.18 MYOFASCIAL PAIN: ICD-10-CM

## 2018-11-08 DIAGNOSIS — M79.7 FIBROMYALGIA: ICD-10-CM

## 2018-11-08 DIAGNOSIS — G89.29 CHRONIC PAIN OF MULTIPLE JOINTS: ICD-10-CM

## 2018-11-08 DIAGNOSIS — M25.50 CHRONIC PAIN OF MULTIPLE JOINTS: ICD-10-CM

## 2018-11-08 PROCEDURE — G8427 DOCREV CUR MEDS BY ELIG CLIN: HCPCS | Performed by: NURSE PRACTITIONER

## 2018-11-08 PROCEDURE — 99214 OFFICE O/P EST MOD 30 MIN: CPT | Performed by: NURSE PRACTITIONER

## 2018-11-08 PROCEDURE — G8419 CALC BMI OUT NRM PARAM NOF/U: HCPCS | Performed by: NURSE PRACTITIONER

## 2018-11-08 PROCEDURE — 3017F COLORECTAL CA SCREEN DOC REV: CPT | Performed by: NURSE PRACTITIONER

## 2018-11-08 PROCEDURE — G8484 FLU IMMUNIZE NO ADMIN: HCPCS | Performed by: NURSE PRACTITIONER

## 2018-11-08 PROCEDURE — 4004F PT TOBACCO SCREEN RCVD TLK: CPT | Performed by: NURSE PRACTITIONER

## 2018-11-08 RX ORDER — HYDROCODONE BITARTRATE AND ACETAMINOPHEN 7.5; 325 MG/1; MG/1
1 TABLET ORAL EVERY 8 HOURS PRN
Qty: 90 TABLET | Refills: 0 | Status: SHIPPED | OUTPATIENT
Start: 2018-11-08 | End: 2018-12-10 | Stop reason: SDUPTHER

## 2018-11-08 ASSESSMENT — ENCOUNTER SYMPTOMS
BACK PAIN: 1
RESPIRATORY NEGATIVE: 1

## 2018-11-08 ASSESSMENT — PATIENT HEALTH QUESTIONNAIRE - PHQ9
SUM OF ALL RESPONSES TO PHQ QUESTIONS 1-9: 2
SUM OF ALL RESPONSES TO PHQ9 QUESTIONS 1 & 2: 2
SUM OF ALL RESPONSES TO PHQ QUESTIONS 1-9: 2
2. FEELING DOWN, DEPRESSED OR HOPELESS: 0
1. LITTLE INTEREST OR PLEASURE IN DOING THINGS: 2

## 2018-11-29 ENCOUNTER — TELEPHONE (OUTPATIENT)
Dept: PAIN MANAGEMENT | Age: 53
End: 2018-11-29

## 2018-11-29 NOTE — TELEPHONE ENCOUNTER
Vic Jaimes called and stated that the pt just had surgery done on 11/7/18 with Dr Cesar Jacobson and he sent in an order for the Norco 5-325 mg tabs to take 1 tab every 4 hours, but she is currently prescribed [place] from [facility] at 7-325 mg tabs to take 1 tab every 8 hours, Giana from [place] wanted to know which script to fill?  And the amount and frequency of the Norco? Please call Saman Woods back at [phone]

## 2018-12-03 DIAGNOSIS — M79.7 FIBROMYALGIA: ICD-10-CM

## 2018-12-03 DIAGNOSIS — M79.18 MYOFASCIAL PAIN: ICD-10-CM

## 2018-12-03 RX ORDER — IBUPROFEN 800 MG/1
800 TABLET ORAL EVERY 8 HOURS PRN
Qty: 90 TABLET | Refills: 5 | Status: SHIPPED | OUTPATIENT
Start: 2018-12-03 | End: 2019-05-28 | Stop reason: SDUPTHER

## 2018-12-10 DIAGNOSIS — M25.50 CHRONIC PAIN OF MULTIPLE JOINTS: ICD-10-CM

## 2018-12-10 DIAGNOSIS — G89.29 CHRONIC PAIN OF MULTIPLE JOINTS: ICD-10-CM

## 2018-12-10 DIAGNOSIS — M79.7 FIBROMYALGIA: ICD-10-CM

## 2018-12-10 DIAGNOSIS — M79.18 MYOFASCIAL PAIN: ICD-10-CM

## 2018-12-11 RX ORDER — HYDROCODONE BITARTRATE AND ACETAMINOPHEN 7.5; 325 MG/1; MG/1
1 TABLET ORAL EVERY 8 HOURS PRN
Qty: 90 TABLET | Refills: 0 | Status: SHIPPED | OUTPATIENT
Start: 2018-12-15 | End: 2019-01-11 | Stop reason: SDUPTHER

## 2019-01-11 ENCOUNTER — HOSPITAL ENCOUNTER (OUTPATIENT)
Age: 54
Setting detail: SPECIMEN
Discharge: HOME | End: 2019-01-11
Payer: MEDICARE

## 2019-01-11 ENCOUNTER — OFFICE VISIT (OUTPATIENT)
Dept: PAIN MANAGEMENT | Age: 54
End: 2019-01-11
Payer: MEDICARE

## 2019-01-11 VITALS
BODY MASS INDEX: 27.11 KG/M2 | WEIGHT: 153 LBS | SYSTOLIC BLOOD PRESSURE: 138 MMHG | RESPIRATION RATE: 16 BRPM | HEIGHT: 63 IN | DIASTOLIC BLOOD PRESSURE: 88 MMHG

## 2019-01-11 DIAGNOSIS — M79.7 FIBROMYALGIA: Primary | ICD-10-CM

## 2019-01-11 DIAGNOSIS — Z02.83 ENCOUNTER FOR DRUG SCREENING: ICD-10-CM

## 2019-01-11 DIAGNOSIS — G89.29 CHRONIC PAIN OF MULTIPLE JOINTS: ICD-10-CM

## 2019-01-11 DIAGNOSIS — M25.50 CHRONIC PAIN OF MULTIPLE JOINTS: ICD-10-CM

## 2019-01-11 DIAGNOSIS — Z79.891 ENCOUNTER FOR LONG-TERM OPIATE ANALGESIC USE: ICD-10-CM

## 2019-01-11 DIAGNOSIS — M79.18 MYOFASCIAL PAIN: ICD-10-CM

## 2019-01-11 DIAGNOSIS — M54.2 CERVICALGIA: ICD-10-CM

## 2019-01-11 PROCEDURE — 4004F PT TOBACCO SCREEN RCVD TLK: CPT | Performed by: NURSE PRACTITIONER

## 2019-01-11 PROCEDURE — 3017F COLORECTAL CA SCREEN DOC REV: CPT | Performed by: NURSE PRACTITIONER

## 2019-01-11 PROCEDURE — 80307 DRUG TEST PRSMV CHEM ANLYZR: CPT

## 2019-01-11 PROCEDURE — G8419 CALC BMI OUT NRM PARAM NOF/U: HCPCS | Performed by: NURSE PRACTITIONER

## 2019-01-11 PROCEDURE — G8484 FLU IMMUNIZE NO ADMIN: HCPCS | Performed by: NURSE PRACTITIONER

## 2019-01-11 PROCEDURE — 99214 OFFICE O/P EST MOD 30 MIN: CPT | Performed by: NURSE PRACTITIONER

## 2019-01-11 PROCEDURE — G8427 DOCREV CUR MEDS BY ELIG CLIN: HCPCS | Performed by: NURSE PRACTITIONER

## 2019-01-11 RX ORDER — BUSPIRONE HYDROCHLORIDE 15 MG/1
15 TABLET ORAL DAILY
COMMUNITY
Start: 2019-01-02 | End: 2019-01-11 | Stop reason: DRUGHIGH

## 2019-01-11 RX ORDER — HYDROCODONE BITARTRATE AND ACETAMINOPHEN 7.5; 325 MG/1; MG/1
1 TABLET ORAL EVERY 6 HOURS PRN
Qty: 120 TABLET | Refills: 0 | Status: SHIPPED | OUTPATIENT
Start: 2019-01-11 | End: 2019-02-07 | Stop reason: SDUPTHER

## 2019-01-11 ASSESSMENT — ENCOUNTER SYMPTOMS
RESPIRATORY NEGATIVE: 1
BACK PAIN: 1

## 2019-01-16 LAB
6-ACETYLMORPHINE, UR: NOT DETECTED
7-AMINOCLONAZEPAM, URINE: NOT DETECTED
ALPHA-OH-ALPRAZ, URINE: NOT DETECTED
ALPRAZOLAM, URINE: NOT DETECTED
AMPHETAMINES, URINE: NOT DETECTED
BARBITURATES, URINE: NOT DETECTED
BENZOYLECGONINE, UR: NOT DETECTED
BUPRENORPHINE URINE: NOT DETECTED
CARISOPRODOL, UR: NOT DETECTED
CLONAZEPAM, URINE: NOT DETECTED
CODEINE, URINE: NOT DETECTED
CREATININE URINE: 71.8 MG/DL (ref 20–400)
DIAZEPAM, URINE: NOT DETECTED
EER PAIN MGT DRUG PANEL, HIGH RES/EMIT U: NORMAL
ETHYL GLUCURONIDE UR: PRESENT
FENTANYL URINE: NOT DETECTED
HYDROCODONE, URINE: PRESENT
HYDROMORPHONE, URINE: NOT DETECTED
LORAZEPAM, URINE: NOT DETECTED
MARIJUANA METAB, UR: NOT DETECTED
MDA, UR: NOT DETECTED
MDEA, EVE, UR: NOT DETECTED
MDMA URINE: NOT DETECTED
MEPERIDINE METAB, UR: NOT DETECTED
METHADONE, URINE: NOT DETECTED
METHAMPHETAMINE, URINE: NOT DETECTED
METHYLPHENIDATE: NOT DETECTED
MIDAZOLAM, URINE: NOT DETECTED
MORPHINE URINE: NOT DETECTED
NORBUPRENORPHINE, URINE: NOT DETECTED
NORDIAZEPAM, URINE: NOT DETECTED
NORFENTANYL, URINE: NOT DETECTED
NORHYDROCODONE, URINE: PRESENT
NOROXYCODONE, URINE: NOT DETECTED
NOROXYMORPHONE, URINE: NOT DETECTED
OXAZEPAM, URINE: NOT DETECTED
OXYCODONE URINE: NOT DETECTED
OXYMORPHONE, URINE: NOT DETECTED
PAIN MGT DRUG PANEL, HI RES, UR: NORMAL
PCP,URINE: NOT DETECTED
PHENTERMINE, UR: NOT DETECTED
PROPOXYPHENE, URINE: NOT DETECTED
TAPENTADOL, URINE: NOT DETECTED
TAPENTADOL-O-SULFATE, URINE: NOT DETECTED
TEMAZEPAM, URINE: NOT DETECTED
TRAMADOL, URINE: NOT DETECTED
ZOLPIDEM, URINE: NOT DETECTED

## 2019-02-07 DIAGNOSIS — G89.29 CHRONIC PAIN OF MULTIPLE JOINTS: ICD-10-CM

## 2019-02-07 DIAGNOSIS — M25.50 CHRONIC PAIN OF MULTIPLE JOINTS: ICD-10-CM

## 2019-02-07 DIAGNOSIS — M79.18 MYOFASCIAL PAIN: ICD-10-CM

## 2019-02-07 DIAGNOSIS — M79.7 FIBROMYALGIA: ICD-10-CM

## 2019-02-07 RX ORDER — HYDROCODONE BITARTRATE AND ACETAMINOPHEN 7.5; 325 MG/1; MG/1
1 TABLET ORAL EVERY 6 HOURS PRN
Qty: 120 TABLET | Refills: 0 | Status: SHIPPED | OUTPATIENT
Start: 2019-02-09 | End: 2019-03-13

## 2019-03-13 ENCOUNTER — OFFICE VISIT (OUTPATIENT)
Dept: PAIN MANAGEMENT | Age: 54
End: 2019-03-13
Payer: MEDICARE

## 2019-03-13 VITALS
OXYGEN SATURATION: 96 % | RESPIRATION RATE: 16 BRPM | SYSTOLIC BLOOD PRESSURE: 132 MMHG | BODY MASS INDEX: 27.82 KG/M2 | HEART RATE: 76 BPM | HEIGHT: 63 IN | DIASTOLIC BLOOD PRESSURE: 80 MMHG | WEIGHT: 157 LBS

## 2019-03-13 DIAGNOSIS — M54.2 CERVICALGIA: ICD-10-CM

## 2019-03-13 DIAGNOSIS — G89.29 CHRONIC PAIN OF MULTIPLE JOINTS: ICD-10-CM

## 2019-03-13 DIAGNOSIS — M79.7 FIBROMYALGIA: Primary | ICD-10-CM

## 2019-03-13 DIAGNOSIS — M25.50 CHRONIC PAIN OF MULTIPLE JOINTS: ICD-10-CM

## 2019-03-13 DIAGNOSIS — M79.18 MYOFASCIAL PAIN: ICD-10-CM

## 2019-03-13 PROCEDURE — 4004F PT TOBACCO SCREEN RCVD TLK: CPT | Performed by: NURSE PRACTITIONER

## 2019-03-13 PROCEDURE — G8427 DOCREV CUR MEDS BY ELIG CLIN: HCPCS | Performed by: NURSE PRACTITIONER

## 2019-03-13 PROCEDURE — G8419 CALC BMI OUT NRM PARAM NOF/U: HCPCS | Performed by: NURSE PRACTITIONER

## 2019-03-13 PROCEDURE — 3017F COLORECTAL CA SCREEN DOC REV: CPT | Performed by: NURSE PRACTITIONER

## 2019-03-13 PROCEDURE — 99214 OFFICE O/P EST MOD 30 MIN: CPT | Performed by: NURSE PRACTITIONER

## 2019-03-13 PROCEDURE — G8484 FLU IMMUNIZE NO ADMIN: HCPCS | Performed by: NURSE PRACTITIONER

## 2019-03-13 RX ORDER — OXYCODONE HYDROCHLORIDE AND ACETAMINOPHEN 5; 325 MG/1; MG/1
1 TABLET ORAL EVERY 6 HOURS PRN
Qty: 120 TABLET | Refills: 0 | Status: SHIPPED | OUTPATIENT
Start: 2019-03-13 | End: 2019-04-08 | Stop reason: SDUPTHER

## 2019-03-13 ASSESSMENT — ENCOUNTER SYMPTOMS
RESPIRATORY NEGATIVE: 1
BACK PAIN: 1

## 2019-03-20 ENCOUNTER — PROCEDURE VISIT (OUTPATIENT)
Dept: PAIN MANAGEMENT | Age: 54
End: 2019-03-20
Payer: MEDICARE

## 2019-03-20 VITALS
BODY MASS INDEX: 27.29 KG/M2 | SYSTOLIC BLOOD PRESSURE: 160 MMHG | RESPIRATION RATE: 16 BRPM | DIASTOLIC BLOOD PRESSURE: 98 MMHG | WEIGHT: 154 LBS | HEIGHT: 63 IN

## 2019-03-20 DIAGNOSIS — M79.18 MYOFASCIAL PAIN: Primary | ICD-10-CM

## 2019-03-20 PROCEDURE — 20553 NJX 1/MLT TRIGGER POINTS 3/>: CPT | Performed by: NURSE PRACTITIONER

## 2019-04-08 DIAGNOSIS — M25.50 CHRONIC PAIN OF MULTIPLE JOINTS: ICD-10-CM

## 2019-04-08 DIAGNOSIS — M79.18 MYOFASCIAL PAIN: ICD-10-CM

## 2019-04-08 DIAGNOSIS — G89.29 CHRONIC PAIN OF MULTIPLE JOINTS: ICD-10-CM

## 2019-04-08 DIAGNOSIS — M79.7 FIBROMYALGIA: ICD-10-CM

## 2019-04-08 RX ORDER — TIZANIDINE 4 MG/1
4 TABLET ORAL 3 TIMES DAILY
Qty: 90 TABLET | Refills: 5 | Status: SHIPPED | OUTPATIENT
Start: 2019-04-08 | End: 2019-09-30 | Stop reason: SDUPTHER

## 2019-04-08 RX ORDER — OXYCODONE HYDROCHLORIDE AND ACETAMINOPHEN 5; 325 MG/1; MG/1
1 TABLET ORAL EVERY 6 HOURS PRN
Qty: 120 TABLET | Refills: 0 | Status: SHIPPED | OUTPATIENT
Start: 2019-04-12 | End: 2019-05-06 | Stop reason: SDUPTHER

## 2019-04-08 NOTE — TELEPHONE ENCOUNTER
Pt called refill line for Percocet and Zanalfex , to be sent to [facility]. Last Appt:  3/13/2019  Next Appt:   4/17/2019  Med verified in [address] checked for [place], [place], and [place]: 3/13/19 Percocet 5-325mg #120. Due 4/12/19.

## 2019-04-17 ENCOUNTER — OFFICE VISIT (OUTPATIENT)
Dept: PAIN MANAGEMENT | Age: 54
End: 2019-04-17
Payer: MEDICARE

## 2019-04-17 VITALS
DIASTOLIC BLOOD PRESSURE: 64 MMHG | HEIGHT: 63 IN | BODY MASS INDEX: 27.3 KG/M2 | SYSTOLIC BLOOD PRESSURE: 100 MMHG | HEART RATE: 80 BPM | RESPIRATION RATE: 16 BRPM | WEIGHT: 154.1 LBS

## 2019-04-17 DIAGNOSIS — M79.7 FIBROMYALGIA: Primary | ICD-10-CM

## 2019-04-17 DIAGNOSIS — M25.50 CHRONIC PAIN OF MULTIPLE JOINTS: ICD-10-CM

## 2019-04-17 DIAGNOSIS — M54.2 CERVICALGIA: ICD-10-CM

## 2019-04-17 DIAGNOSIS — G89.29 CHRONIC PAIN OF MULTIPLE JOINTS: ICD-10-CM

## 2019-04-17 DIAGNOSIS — G89.29 ENCOUNTER FOR CHRONIC PAIN MANAGEMENT: ICD-10-CM

## 2019-04-17 DIAGNOSIS — M79.18 MYOFASCIAL PAIN: ICD-10-CM

## 2019-04-17 PROCEDURE — 3017F COLORECTAL CA SCREEN DOC REV: CPT | Performed by: NURSE PRACTITIONER

## 2019-04-17 PROCEDURE — 4004F PT TOBACCO SCREEN RCVD TLK: CPT | Performed by: NURSE PRACTITIONER

## 2019-04-17 PROCEDURE — G8419 CALC BMI OUT NRM PARAM NOF/U: HCPCS | Performed by: NURSE PRACTITIONER

## 2019-04-17 PROCEDURE — G8427 DOCREV CUR MEDS BY ELIG CLIN: HCPCS | Performed by: NURSE PRACTITIONER

## 2019-04-17 PROCEDURE — 99214 OFFICE O/P EST MOD 30 MIN: CPT | Performed by: NURSE PRACTITIONER

## 2019-04-17 ASSESSMENT — ENCOUNTER SYMPTOMS
RESPIRATORY NEGATIVE: 1
BACK PAIN: 1

## 2019-04-17 NOTE — PROGRESS NOTES
Subjective:      Patient ID: Kristel Melton is a 52 y.o. female. Chief Complaint   Patient presents with    Pain     Fibromyalgia     HPI   Here today for routine pain clinic recheck, chronic myofascial pain, chronic joint pain    Opiate rotation [facility] visit from [facility] to Percocet. Percocet more effective for pain relief, no side effects, just slightly more \"sleepy\" in the afternoons. Percocet improves her function and quality of life. Pain Assessment  Location of Pain: (Fibromyaglia)  Location Modifiers: Left, Right  Severity of Pain: 5  Quality of Pain: Throbbing, Aching  Duration of Pain: Persistent  Frequency of Pain: Constant  Aggravating Factors: Bending, Walking, Standing  Limiting Behavior: Yes  Relieving Factors: Rest(Pain medication)  Result of Injury: No  Work-Related Injury: No  Are there other pain locations you wish to document?: No    No Known Allergies    Outpatient Medications Marked as Taking for the 19 encounter (Office Visit) with JACKLYN Dueñas - CNP   Medication Sig Dispense Refill    oxyCODONE-acetaminophen (PERCOCET) 5-325 MG per tablet Take 1 tablet by mouth every 6 hours as needed for Pain for up to 30 days.  120 tablet 0    tiZANidine (ZANAFLEX) 4 MG tablet Take 1 tablet by mouth 3 times daily 90 tablet 5    ibuprofen (ADVIL;MOTRIN) 800 MG tablet Take 1 tablet by mouth every 8 hours as needed for Pain 90 tablet 5    desvenlafaxine succinate (PRISTIQ) 100 MG TB24 extended release tablet Take 100 mg by mouth daily      busPIRone (BUSPAR) 10 MG tablet Take 10 mg by mouth      traZODone (DESYREL) 100 MG tablet Take 100 mg by mouth          Past Medical History:   Diagnosis Date    Anxiety     Arthritis     CMC (carpometacarpal joint) dislocation, left, sequela     DJD (degenerative joint disease) of cervical spine     Fibromyalgia        Past Surgical History:   Procedure Laterality Date    BREAST SURGERY  2002     SECTION      times 2    ENDOMETRIAL ABLATION  2012    HAND SURGERY Left 2017    SHOULDER SURGERY Right 2014       History reviewed. No pertinent family history. Social History     Socioeconomic History    Marital status: Single     Spouse name: None    Number of children: None    Years of education: None    Highest education level: None   Occupational History    None   Social Needs    Financial resource strain: None    Food insecurity:     Worry: None     Inability: None    Transportation needs:     Medical: None     Non-medical: None   Tobacco Use    Smoking status: Current Every Day Smoker     Packs/day: 0.75     Types: Cigarettes    Smokeless tobacco: Never Used   Substance and Sexual Activity    Alcohol use: Yes     Alcohol/week: 2.4 oz     Types: 4 Shots of liquor per week    Drug use: No    Sexual activity: Yes     Partners: Male   Lifestyle    Physical activity:     Days per week: None     Minutes per session: None    Stress: None   Relationships    Social connections:     Talks on phone: None     Gets together: None     Attends Holiness service: None     Active member of club or organization: None     Attends meetings of clubs or organizations: None     Relationship status: None    Intimate partner violence:     Fear of current or ex partner: None     Emotionally abused: None     Physically abused: None     Forced sexual activity: None   Other Topics Concern    None   Social History Narrative    None     Review of Systems   Constitutional: Positive for activity change (limited) and fatigue. Respiratory: Negative. Cardiovascular: Negative. Genitourinary: Negative. Musculoskeletal: Positive for back pain and myalgias. Neurological: Negative for weakness and numbness. Psychiatric/Behavioral: Positive for sleep disturbance. History anxiety       Objective:   Physical Exam   Constitutional: She is oriented to person, place, and time.  Vital signs are normal. She appears well-developed and two months     Kristie Ashby, APRN - CNP

## 2019-05-06 DIAGNOSIS — M79.7 FIBROMYALGIA: ICD-10-CM

## 2019-05-06 DIAGNOSIS — M79.18 MYOFASCIAL PAIN: ICD-10-CM

## 2019-05-06 DIAGNOSIS — M25.50 CHRONIC PAIN OF MULTIPLE JOINTS: ICD-10-CM

## 2019-05-06 DIAGNOSIS — G89.29 CHRONIC PAIN OF MULTIPLE JOINTS: ICD-10-CM

## 2019-05-06 NOTE — TELEPHONE ENCOUNTER
Patient called on 05/06/2019 to request her refill on refill line    Last Appt:  4/17/2019  Next Appt:   6/24/2019  Med verified in [address] [facility] checked for [place], and [place]: 04/08/2019 Percocet 5/325 #120. Due 05/08/2019.

## 2019-05-07 RX ORDER — OXYCODONE HYDROCHLORIDE AND ACETAMINOPHEN 5; 325 MG/1; MG/1
1 TABLET ORAL EVERY 6 HOURS PRN
Qty: 120 TABLET | Refills: 0 | Status: SHIPPED | OUTPATIENT
Start: 2019-05-08 | End: 2019-06-04 | Stop reason: SDUPTHER

## 2019-05-28 DIAGNOSIS — M79.7 FIBROMYALGIA: ICD-10-CM

## 2019-05-28 DIAGNOSIS — M79.18 MYOFASCIAL PAIN: ICD-10-CM

## 2019-05-28 RX ORDER — IBUPROFEN 800 MG/1
800 TABLET ORAL EVERY 8 HOURS PRN
Qty: 90 TABLET | Refills: 5 | Status: SHIPPED | OUTPATIENT
Start: 2019-05-28 | End: 2019-09-30 | Stop reason: SDUPTHER

## 2019-05-28 NOTE — TELEPHONE ENCOUNTER
Last Appt:  4/17/2019  Next Appt:   6/24/2019  Med verified in [facility]    Pt called refill line would like to have her ibuprofen sent to pharmacy

## 2019-06-04 DIAGNOSIS — M79.18 MYOFASCIAL PAIN: ICD-10-CM

## 2019-06-04 DIAGNOSIS — G89.29 CHRONIC PAIN OF MULTIPLE JOINTS: ICD-10-CM

## 2019-06-04 DIAGNOSIS — M25.50 CHRONIC PAIN OF MULTIPLE JOINTS: ICD-10-CM

## 2019-06-04 DIAGNOSIS — M79.7 FIBROMYALGIA: ICD-10-CM

## 2019-06-05 RX ORDER — OXYCODONE HYDROCHLORIDE AND ACETAMINOPHEN 5; 325 MG/1; MG/1
1 TABLET ORAL EVERY 6 HOURS PRN
Qty: 120 TABLET | Refills: 0 | Status: SHIPPED | OUTPATIENT
Start: 2019-06-06 | End: 2019-07-01 | Stop reason: SDUPTHER

## 2019-06-05 NOTE — TELEPHONE ENCOUNTER
Patient notified that her script has been approved and sent to the pharmacy, should be available 6/6/19 for

## 2019-06-24 ENCOUNTER — OFFICE VISIT (OUTPATIENT)
Dept: PAIN MANAGEMENT | Age: 54
End: 2019-06-24
Payer: MEDICARE

## 2019-06-24 VITALS
WEIGHT: 159.8 LBS | HEART RATE: 78 BPM | HEIGHT: 63 IN | DIASTOLIC BLOOD PRESSURE: 72 MMHG | BODY MASS INDEX: 28.31 KG/M2 | SYSTOLIC BLOOD PRESSURE: 132 MMHG

## 2019-06-24 DIAGNOSIS — M79.7 FIBROMYALGIA: ICD-10-CM

## 2019-06-24 DIAGNOSIS — G89.29 CHRONIC PAIN OF MULTIPLE JOINTS: ICD-10-CM

## 2019-06-24 DIAGNOSIS — M79.18 MYOFASCIAL PAIN: Primary | ICD-10-CM

## 2019-06-24 DIAGNOSIS — M25.50 CHRONIC PAIN OF MULTIPLE JOINTS: ICD-10-CM

## 2019-06-24 PROCEDURE — G8428 CUR MEDS NOT DOCUMENT: HCPCS | Performed by: NURSE PRACTITIONER

## 2019-06-24 PROCEDURE — 4004F PT TOBACCO SCREEN RCVD TLK: CPT | Performed by: NURSE PRACTITIONER

## 2019-06-24 PROCEDURE — 3017F COLORECTAL CA SCREEN DOC REV: CPT | Performed by: NURSE PRACTITIONER

## 2019-06-24 PROCEDURE — 99214 OFFICE O/P EST MOD 30 MIN: CPT | Performed by: NURSE PRACTITIONER

## 2019-06-24 PROCEDURE — G8419 CALC BMI OUT NRM PARAM NOF/U: HCPCS | Performed by: NURSE PRACTITIONER

## 2019-06-24 ASSESSMENT — ENCOUNTER SYMPTOMS
RESPIRATORY NEGATIVE: 1
BACK PAIN: 1

## 2019-06-24 NOTE — PROGRESS NOTES
Physical Exam   Constitutional: She is oriented to person, place, and time. Vital signs are normal. She appears well-developed and well-nourished. She is active and cooperative. Non-toxic appearance. She does not have a sickly appearance. She does not appear ill. No distress. HENT:   Head: Normocephalic and atraumatic. Cardiovascular: Normal rate. Pulmonary/Chest: Effort normal. No accessory muscle usage. No apnea. No respiratory distress. No audible wheezing   Neurological: She is alert and oriented to person, place, and time. She is not disoriented. No cranial nerve deficit. GCS eye subscore is 4. GCS verbal subscore is 5. GCS motor subscore is 6. Skin: Skin is warm, dry and intact. She is not diaphoretic. No cyanosis. No pallor. Nails show no clubbing. Psychiatric: She has a normal mood and affect. Her behavior is normal. Judgment normal. She is not agitated, not aggressive and not withdrawn. She does not express impulsivity or inappropriate judgment. She is communicative. Vitals reviewed. Assessment:      1. Myofascial pain    2. Fibromyalgia    3. Chronic pain of multiple joints          Plan:       Chronic pain diagnoses such as   1. Myofascial pain    2. Fibromyalgia    3. Chronic pain of multiple joints     controlled on current medication regime, wll continue current pain medications to improve quality of life and function. No aberrant behaviors noted. Okay to try CBD oil for additional pain relief. Controlled Substances Monitoring:     RX Monitoring 4/17/2019   Attestation The Prescription Monitoring Report for this patient was reviewed today. Periodic Controlled Substance Monitoring No signs of potential drug abuse or diversion identified: otherwise, see note documentation;Obtaining appropriate analgesic effect of treatment. Chronic Pain > 50 MEDD Obtained or confirmened \"Consent of Opioid Use\" on file.    Chronic Pain > 80 MEDD Obtained or confirmed a written medication contract was on file.       follow up two months     Padmini Preston, APRN - CNP

## 2019-07-01 DIAGNOSIS — M79.18 MYOFASCIAL PAIN: ICD-10-CM

## 2019-07-01 DIAGNOSIS — M79.7 FIBROMYALGIA: ICD-10-CM

## 2019-07-01 DIAGNOSIS — M25.50 CHRONIC PAIN OF MULTIPLE JOINTS: ICD-10-CM

## 2019-07-01 DIAGNOSIS — G89.29 CHRONIC PAIN OF MULTIPLE JOINTS: ICD-10-CM

## 2019-07-01 RX ORDER — OXYCODONE HYDROCHLORIDE AND ACETAMINOPHEN 5; 325 MG/1; MG/1
1 TABLET ORAL EVERY 6 HOURS PRN
Qty: 120 TABLET | Refills: 0 | Status: SHIPPED | OUTPATIENT
Start: 2019-07-06 | End: 2019-08-01 | Stop reason: SDUPTHER

## 2019-07-01 NOTE — TELEPHONE ENCOUNTER
Last Appt:  6/24/2019  Next Appt:   8/27/2019  Med verified in [facility] checked for [place], and [place]: 6/6/19 Percocet 5-325 mg  #120. Due 7/6/19.     Pt called the refill line for her Percocet 5-325 mg tabs to be sent to [facility]

## 2019-08-01 DIAGNOSIS — M25.50 CHRONIC PAIN OF MULTIPLE JOINTS: ICD-10-CM

## 2019-08-01 DIAGNOSIS — G89.29 CHRONIC PAIN OF MULTIPLE JOINTS: ICD-10-CM

## 2019-08-01 DIAGNOSIS — M79.18 MYOFASCIAL PAIN: ICD-10-CM

## 2019-08-01 DIAGNOSIS — M79.7 FIBROMYALGIA: ICD-10-CM

## 2019-08-02 RX ORDER — OXYCODONE HYDROCHLORIDE AND ACETAMINOPHEN 5; 325 MG/1; MG/1
1 TABLET ORAL EVERY 6 HOURS PRN
Qty: 120 TABLET | Refills: 0 | Status: SHIPPED | OUTPATIENT
Start: 2019-08-05 | End: 2019-08-30 | Stop reason: SDUPTHER

## 2019-08-30 ENCOUNTER — OFFICE VISIT (OUTPATIENT)
Dept: PAIN MANAGEMENT | Age: 54
End: 2019-08-30
Payer: MEDICARE

## 2019-08-30 VITALS
HEART RATE: 68 BPM | HEIGHT: 63 IN | BODY MASS INDEX: 26.93 KG/M2 | DIASTOLIC BLOOD PRESSURE: 76 MMHG | SYSTOLIC BLOOD PRESSURE: 124 MMHG | WEIGHT: 152 LBS

## 2019-08-30 DIAGNOSIS — M79.18 MYOFASCIAL PAIN: ICD-10-CM

## 2019-08-30 DIAGNOSIS — M79.7 FIBROMYALGIA: ICD-10-CM

## 2019-08-30 DIAGNOSIS — G89.29 CHRONIC PAIN OF MULTIPLE JOINTS: ICD-10-CM

## 2019-08-30 DIAGNOSIS — M25.50 CHRONIC PAIN OF MULTIPLE JOINTS: ICD-10-CM

## 2019-08-30 PROCEDURE — 4004F PT TOBACCO SCREEN RCVD TLK: CPT | Performed by: NURSE PRACTITIONER

## 2019-08-30 PROCEDURE — G8427 DOCREV CUR MEDS BY ELIG CLIN: HCPCS | Performed by: NURSE PRACTITIONER

## 2019-08-30 PROCEDURE — G8419 CALC BMI OUT NRM PARAM NOF/U: HCPCS | Performed by: NURSE PRACTITIONER

## 2019-08-30 PROCEDURE — 20553 NJX 1/MLT TRIGGER POINTS 3/>: CPT | Performed by: NURSE PRACTITIONER

## 2019-08-30 PROCEDURE — 99214 OFFICE O/P EST MOD 30 MIN: CPT | Performed by: NURSE PRACTITIONER

## 2019-08-30 PROCEDURE — 3017F COLORECTAL CA SCREEN DOC REV: CPT | Performed by: NURSE PRACTITIONER

## 2019-08-30 RX ORDER — OXYCODONE HYDROCHLORIDE AND ACETAMINOPHEN 5; 325 MG/1; MG/1
1 TABLET ORAL EVERY 6 HOURS PRN
Qty: 120 TABLET | Refills: 0 | Status: SHIPPED | OUTPATIENT
Start: 2019-09-04 | End: 2019-09-30 | Stop reason: SDUPTHER

## 2019-08-30 ASSESSMENT — ENCOUNTER SYMPTOMS
BACK PAIN: 1
RESPIRATORY NEGATIVE: 1

## 2019-08-30 NOTE — PROGRESS NOTES
Subjective:      Patient ID: Chad Verduzco is a 52 y.o. female. Chief Complaint   Patient presents with    Shoulder Pain     pain in shoulder, upper back and neck    Neck Pain     the patient has been getting her neck and back adjusted through chiropractic care     HPI   Here today for routine pain clinic recheck, chronic myofascial pain, chronic joint pain    Requesting TPI's, increase myofascial pain, taking Percocet q 4 hours. She did tryCBD oil, made her nauseated    Pain Assessment  Location of Pain: Shoulder  Location Modifiers: Left, Right(pain in shoulders, neck and upper back)  Severity of Pain: 7  Quality of Pain: Aching(burning)  Duration of Pain: Persistent  Frequency of Pain: Constant  Aggravating Factors: Stretching, Straightening  Limiting Behavior: Yes  Relieving Factors: Rest, Ice, Heat(massage devices, chiropractic care)  Are there other pain locations you wish to document?: No    No Known Allergies    Outpatient Medications Marked as Taking for the 19 encounter (Office Visit) with JACKLYN Ribeiro CNP   Medication Sig Dispense Refill    [START ON 2019] oxyCODONE-acetaminophen (PERCOCET) 5-325 MG per tablet Take 1 tablet by mouth every 6 hours as needed for Pain for up to 30 days.  120 tablet 0    ibuprofen (ADVIL;MOTRIN) 800 MG tablet Take 1 tablet by mouth every 8 hours as needed for Pain 90 tablet 5    tiZANidine (ZANAFLEX) 4 MG tablet Take 1 tablet by mouth 3 times daily 90 tablet 5    desvenlafaxine succinate (PRISTIQ) 100 MG TB24 extended release tablet Take 100 mg by mouth daily      busPIRone (BUSPAR) 10 MG tablet Take 10 mg by mouth         Past Medical History:   Diagnosis Date    Anxiety     Arthritis     CMC (carpometacarpal joint) dislocation, left, sequela     DJD (degenerative joint disease) of cervical spine     Fibromyalgia        Past Surgical History:   Procedure Laterality Date    BREAST SURGERY  2002     SECTION      times 2    well-developed and well-nourished. She is active and cooperative. Non-toxic appearance. She does not have a sickly appearance. She does not appear ill. No distress. HENT:   Head: Normocephalic and atraumatic. Cardiovascular: Normal rate. Pulmonary/Chest: Effort normal. No accessory muscle usage. No apnea. No respiratory distress. No audible wheezing   Neurological: She is alert and oriented to person, place, and time. She is not disoriented. No cranial nerve deficit. GCS eye subscore is 4. GCS verbal subscore is 5. GCS motor subscore is 6. Skin: Skin is warm, dry and intact. She is not diaphoretic. No cyanosis. No pallor. Nails show no clubbing. Psychiatric: She has a normal mood and affect. Her behavior is normal. Judgment normal. She is not agitated, not aggressive and not withdrawn. She does not express impulsivity or inappropriate judgment. She is communicative. Vitals reviewed. Assessment:      1. Fibromyalgia    2. Myofascial pain    3. Chronic pain of multiple joints          Plan:       Chronic pain diagnoses such as   1. Fibromyalgia    2. Myofascial pain    3. Chronic pain of multiple joints     controlled on current medication regime, wll continue current pain medications to improve quality of life and function. No aberrant behaviors noted. TPI's given at today's visit, please see separate note  No early refill if she runs out  Controlled Substances Monitoring:     RX Monitoring 7/1/2019   Attestation -   Periodic Controlled Substance Monitoring Possible medication side effects, risk of tolerance/dependence & alternative treatments discussed. ;No signs of potential drug abuse or diversion identified.    Chronic Pain > 50 MEDD -   Chronic Pain > 80 MEDD -      follow up two months     JACKLYN Buckley - CNP

## 2019-09-30 ENCOUNTER — HOSPITAL ENCOUNTER (OUTPATIENT)
Dept: GENERAL RADIOLOGY | Age: 54
Discharge: HOME | End: 2019-10-02
Payer: MEDICARE

## 2019-09-30 ENCOUNTER — OFFICE VISIT (OUTPATIENT)
Dept: PAIN MANAGEMENT | Age: 54
End: 2019-09-30
Payer: MEDICARE

## 2019-09-30 VITALS
HEART RATE: 77 BPM | DIASTOLIC BLOOD PRESSURE: 72 MMHG | SYSTOLIC BLOOD PRESSURE: 128 MMHG | WEIGHT: 151.9 LBS | HEIGHT: 63 IN | BODY MASS INDEX: 26.91 KG/M2 | RESPIRATION RATE: 16 BRPM

## 2019-09-30 DIAGNOSIS — M79.18 MYOFASCIAL PAIN: ICD-10-CM

## 2019-09-30 DIAGNOSIS — M25.562 CHRONIC PAIN OF BOTH KNEES: ICD-10-CM

## 2019-09-30 DIAGNOSIS — G89.29 CHRONIC PAIN OF BOTH KNEES: Primary | ICD-10-CM

## 2019-09-30 DIAGNOSIS — M15.9 PRIMARY OSTEOARTHRITIS INVOLVING MULTIPLE JOINTS: ICD-10-CM

## 2019-09-30 DIAGNOSIS — M25.561 CHRONIC PAIN OF BOTH KNEES: ICD-10-CM

## 2019-09-30 DIAGNOSIS — M25.50 CHRONIC PAIN OF MULTIPLE JOINTS: ICD-10-CM

## 2019-09-30 DIAGNOSIS — M25.561 CHRONIC PAIN OF BOTH KNEES: Primary | ICD-10-CM

## 2019-09-30 DIAGNOSIS — M79.7 FIBROMYALGIA: ICD-10-CM

## 2019-09-30 DIAGNOSIS — G89.29 CHRONIC PAIN OF BOTH KNEES: ICD-10-CM

## 2019-09-30 DIAGNOSIS — M25.562 CHRONIC PAIN OF BOTH KNEES: Primary | ICD-10-CM

## 2019-09-30 DIAGNOSIS — G89.29 CHRONIC PAIN OF MULTIPLE JOINTS: ICD-10-CM

## 2019-09-30 PROCEDURE — G8427 DOCREV CUR MEDS BY ELIG CLIN: HCPCS | Performed by: NURSE PRACTITIONER

## 2019-09-30 PROCEDURE — 99214 OFFICE O/P EST MOD 30 MIN: CPT | Performed by: NURSE PRACTITIONER

## 2019-09-30 PROCEDURE — 73562 X-RAY EXAM OF KNEE 3: CPT

## 2019-09-30 PROCEDURE — 3017F COLORECTAL CA SCREEN DOC REV: CPT | Performed by: NURSE PRACTITIONER

## 2019-09-30 PROCEDURE — G8419 CALC BMI OUT NRM PARAM NOF/U: HCPCS | Performed by: NURSE PRACTITIONER

## 2019-09-30 PROCEDURE — 4004F PT TOBACCO SCREEN RCVD TLK: CPT | Performed by: NURSE PRACTITIONER

## 2019-09-30 RX ORDER — OXYCODONE HYDROCHLORIDE AND ACETAMINOPHEN 5; 325 MG/1; MG/1
1 TABLET ORAL EVERY 6 HOURS PRN
Qty: 120 TABLET | Refills: 0 | Status: SHIPPED | OUTPATIENT
Start: 2019-10-04 | End: 2019-10-29 | Stop reason: SDUPTHER

## 2019-09-30 RX ORDER — TIZANIDINE 4 MG/1
4 TABLET ORAL 3 TIMES DAILY
Qty: 90 TABLET | Refills: 5 | Status: SHIPPED | OUTPATIENT
Start: 2019-09-30 | End: 2020-03-23 | Stop reason: SDUPTHER

## 2019-09-30 RX ORDER — IBUPROFEN 800 MG/1
800 TABLET ORAL EVERY 6 HOURS PRN
Qty: 120 TABLET | Refills: 5 | Status: SHIPPED | OUTPATIENT
Start: 2019-09-30 | End: 2019-11-22 | Stop reason: SDUPTHER

## 2019-09-30 ASSESSMENT — ENCOUNTER SYMPTOMS
RESPIRATORY NEGATIVE: 1
BACK PAIN: 1

## 2019-09-30 NOTE — PROGRESS NOTES
sexual activity: None   Other Topics Concern    None   Social History Narrative    None     Review of Systems   Constitutional: Positive for activity change (limited) and fatigue. Respiratory: Negative. Cardiovascular: Negative. Genitourinary: Negative. Musculoskeletal: Positive for arthralgias, back pain, joint swelling, myalgias and neck pain. Neurological: Negative for weakness and numbness. Psychiatric/Behavioral: Positive for sleep disturbance. History anxiety       Objective:   Physical Exam   Constitutional: She is oriented to person, place, and time. Vital signs are normal. She appears well-developed and well-nourished. She is active and cooperative. Non-toxic appearance. She does not have a sickly appearance. She does not appear ill. No distress. HENT:   Head: Normocephalic and atraumatic. Cardiovascular: Normal rate. Pulmonary/Chest: Effort normal. No accessory muscle usage. No apnea. No respiratory distress. No audible wheezing   Neurological: She is alert and oriented to person, place, and time. She is not disoriented. No cranial nerve deficit. GCS eye subscore is 4. GCS verbal subscore is 5. GCS motor subscore is 6. Skin: Skin is warm, dry and intact. She is not diaphoretic. No cyanosis. No pallor. Nails show no clubbing. Psychiatric: She has a normal mood and affect. Her behavior is normal. Judgment normal. She is not agitated, not aggressive and not withdrawn. She does not express impulsivity or inappropriate judgment. She is communicative. Vitals reviewed. Assessment:      1. Chronic pain of both knees    2. Fibromyalgia    3. Myofascial pain    4. Chronic pain of multiple joints    5. Primary osteoarthritis involving multiple joints          Plan:       Chronic pain diagnoses such as   1. Chronic pain of both knees    2. Fibromyalgia    3. Myofascial pain    4. Chronic pain of multiple joints    5.  Primary osteoarthritis involving multiple joints controlled on current medication regime, wll continue current pain medications to improve quality of life and function. Okay to take 4 ibuprofen per day on bad days, instructed to take no more than 4/day  Bilateral knee xrays, consider knee joint injections  Controlled Substances Monitoring:     RX Monitoring 9/30/2019   Attestation -   Periodic Controlled Substance Monitoring No signs of potential drug abuse or diversion identified.    Chronic Pain > 50 MEDD -   Chronic Pain > 80 MEDD -      follow up two months     JACKLYN Spence - CNP

## 2019-10-09 ENCOUNTER — PROCEDURE VISIT (OUTPATIENT)
Dept: PAIN MANAGEMENT | Age: 54
End: 2019-10-09
Payer: MEDICARE

## 2019-10-09 VITALS
HEIGHT: 63 IN | WEIGHT: 151.9 LBS | HEART RATE: 76 BPM | RESPIRATION RATE: 16 BRPM | BODY MASS INDEX: 26.91 KG/M2 | DIASTOLIC BLOOD PRESSURE: 90 MMHG | SYSTOLIC BLOOD PRESSURE: 130 MMHG

## 2019-10-09 DIAGNOSIS — G89.29 CHRONIC PAIN OF BOTH KNEES: Primary | ICD-10-CM

## 2019-10-09 DIAGNOSIS — M25.561 CHRONIC PAIN OF BOTH KNEES: Primary | ICD-10-CM

## 2019-10-09 DIAGNOSIS — M25.562 CHRONIC PAIN OF BOTH KNEES: Primary | ICD-10-CM

## 2019-10-09 PROCEDURE — 20610 DRAIN/INJ JOINT/BURSA W/O US: CPT | Performed by: NURSE PRACTITIONER

## 2019-10-09 RX ORDER — TRIAMCINOLONE ACETONIDE 40 MG/ML
40 INJECTION, SUSPENSION INTRA-ARTICULAR; INTRAMUSCULAR ONCE
Status: COMPLETED | OUTPATIENT
Start: 2019-10-09 | End: 2019-10-09

## 2019-10-09 RX ADMIN — TRIAMCINOLONE ACETONIDE 40 MG: 40 INJECTION, SUSPENSION INTRA-ARTICULAR; INTRAMUSCULAR at 15:45

## 2019-10-29 DIAGNOSIS — M79.18 MYOFASCIAL PAIN: ICD-10-CM

## 2019-10-29 DIAGNOSIS — G89.29 CHRONIC PAIN OF MULTIPLE JOINTS: ICD-10-CM

## 2019-10-29 DIAGNOSIS — M79.7 FIBROMYALGIA: ICD-10-CM

## 2019-10-29 DIAGNOSIS — M25.50 CHRONIC PAIN OF MULTIPLE JOINTS: ICD-10-CM

## 2019-10-29 RX ORDER — OXYCODONE HYDROCHLORIDE AND ACETAMINOPHEN 5; 325 MG/1; MG/1
1 TABLET ORAL EVERY 6 HOURS PRN
Qty: 120 TABLET | Refills: 0 | Status: SHIPPED | OUTPATIENT
Start: 2019-11-03 | End: 2019-12-02 | Stop reason: SDUPTHER

## 2019-11-22 ENCOUNTER — HOSPITAL ENCOUNTER (OUTPATIENT)
Age: 54
Setting detail: SPECIMEN
Discharge: HOME | End: 2019-11-22
Payer: MEDICARE

## 2019-11-22 ENCOUNTER — OFFICE VISIT (OUTPATIENT)
Dept: PAIN MANAGEMENT | Age: 54
End: 2019-11-22
Payer: MEDICARE

## 2019-11-22 VITALS
DIASTOLIC BLOOD PRESSURE: 86 MMHG | WEIGHT: 151.9 LBS | RESPIRATION RATE: 16 BRPM | BODY MASS INDEX: 26.91 KG/M2 | HEART RATE: 74 BPM | SYSTOLIC BLOOD PRESSURE: 140 MMHG | HEIGHT: 63 IN

## 2019-11-22 DIAGNOSIS — Z02.83 ENCOUNTER FOR DRUG SCREENING: ICD-10-CM

## 2019-11-22 DIAGNOSIS — M79.7 FIBROMYALGIA: Primary | ICD-10-CM

## 2019-11-22 DIAGNOSIS — G89.29 ENCOUNTER FOR CHRONIC PAIN MANAGEMENT: ICD-10-CM

## 2019-11-22 DIAGNOSIS — M79.18 MYOFASCIAL PAIN: ICD-10-CM

## 2019-11-22 PROCEDURE — 4004F PT TOBACCO SCREEN RCVD TLK: CPT | Performed by: NURSE PRACTITIONER

## 2019-11-22 PROCEDURE — G8427 DOCREV CUR MEDS BY ELIG CLIN: HCPCS | Performed by: NURSE PRACTITIONER

## 2019-11-22 PROCEDURE — 99214 OFFICE O/P EST MOD 30 MIN: CPT | Performed by: NURSE PRACTITIONER

## 2019-11-22 PROCEDURE — G8484 FLU IMMUNIZE NO ADMIN: HCPCS | Performed by: NURSE PRACTITIONER

## 2019-11-22 PROCEDURE — 3017F COLORECTAL CA SCREEN DOC REV: CPT | Performed by: NURSE PRACTITIONER

## 2019-11-22 PROCEDURE — 80307 DRUG TEST PRSMV CHEM ANLYZR: CPT

## 2019-11-22 PROCEDURE — G8419 CALC BMI OUT NRM PARAM NOF/U: HCPCS | Performed by: NURSE PRACTITIONER

## 2019-11-22 RX ORDER — IBUPROFEN 800 MG/1
800 TABLET ORAL EVERY 6 HOURS PRN
Qty: 120 TABLET | Refills: 5 | Status: SHIPPED | OUTPATIENT
Start: 2019-11-22 | End: 2020-05-19 | Stop reason: SDUPTHER

## 2019-11-22 ASSESSMENT — ENCOUNTER SYMPTOMS
BACK PAIN: 1
RESPIRATORY NEGATIVE: 1

## 2019-11-25 LAB
6-ACETYLMORPHINE, UR: NOT DETECTED
7-AMINOCLONAZEPAM, URINE: NOT DETECTED
ALPHA-OH-ALPRAZ, URINE: NOT DETECTED
ALPRAZOLAM, URINE: NOT DETECTED
AMPHETAMINES, URINE: NOT DETECTED
BARBITURATES, URINE: NOT DETECTED
BENZOYLECGONINE, UR: NOT DETECTED
BUPRENORPHINE URINE: NOT DETECTED
CARISOPRODOL, UR: NOT DETECTED
CLONAZEPAM, URINE: NOT DETECTED
CODEINE, URINE: NOT DETECTED
CREATININE URINE: 98.5 MG/DL (ref 20–400)
DIAZEPAM, URINE: NOT DETECTED
EER PAIN MGT DRUG PANEL, HIGH RES/EMIT U: NORMAL
ETHYL GLUCURONIDE UR: PRESENT
FENTANYL URINE: NOT DETECTED
HYDROCODONE, URINE: NOT DETECTED
HYDROMORPHONE, URINE: NOT DETECTED
LORAZEPAM, URINE: NOT DETECTED
MARIJUANA METAB, UR: NOT DETECTED
MDA, UR: NOT DETECTED
MDEA, EVE, UR: NOT DETECTED
MDMA URINE: NOT DETECTED
MEPERIDINE METAB, UR: NOT DETECTED
METHADONE, URINE: NOT DETECTED
METHAMPHETAMINE, URINE: NOT DETECTED
METHYLPHENIDATE: NOT DETECTED
MIDAZOLAM, URINE: NOT DETECTED
MORPHINE URINE: NOT DETECTED
NORBUPRENORPHINE, URINE: NOT DETECTED
NORDIAZEPAM, URINE: NOT DETECTED
NORFENTANYL, URINE: NOT DETECTED
NORHYDROCODONE, URINE: NOT DETECTED
NOROXYCODONE, URINE: PRESENT
NOROXYMORPHONE, URINE: NOT DETECTED
OXAZEPAM, URINE: NOT DETECTED
OXYCODONE URINE: PRESENT
OXYMORPHONE, URINE: NOT DETECTED
PAIN MGT DRUG PANEL, HI RES, UR: NORMAL
PCP,URINE: NOT DETECTED
PHENTERMINE, UR: NOT DETECTED
PROPOXYPHENE, URINE: NOT DETECTED
TAPENTADOL, URINE: NOT DETECTED
TAPENTADOL-O-SULFATE, URINE: NOT DETECTED
TEMAZEPAM, URINE: NOT DETECTED
TRAMADOL, URINE: NOT DETECTED
ZOLPIDEM, URINE: NOT DETECTED

## 2019-12-02 DIAGNOSIS — M79.18 MYOFASCIAL PAIN: ICD-10-CM

## 2019-12-02 DIAGNOSIS — M25.50 CHRONIC PAIN OF MULTIPLE JOINTS: ICD-10-CM

## 2019-12-02 DIAGNOSIS — M79.7 FIBROMYALGIA: ICD-10-CM

## 2019-12-02 DIAGNOSIS — G89.29 CHRONIC PAIN OF MULTIPLE JOINTS: ICD-10-CM

## 2019-12-02 RX ORDER — OXYCODONE HYDROCHLORIDE AND ACETAMINOPHEN 5; 325 MG/1; MG/1
1 TABLET ORAL EVERY 6 HOURS PRN
Qty: 120 TABLET | Refills: 0 | Status: SHIPPED | OUTPATIENT
Start: 2019-12-02 | End: 2019-12-17 | Stop reason: SDUPTHER

## 2019-12-17 DIAGNOSIS — M79.7 FIBROMYALGIA: ICD-10-CM

## 2019-12-17 DIAGNOSIS — G89.29 CHRONIC PAIN OF MULTIPLE JOINTS: ICD-10-CM

## 2019-12-17 DIAGNOSIS — M25.50 CHRONIC PAIN OF MULTIPLE JOINTS: ICD-10-CM

## 2019-12-17 DIAGNOSIS — M79.18 MYOFASCIAL PAIN: ICD-10-CM

## 2019-12-19 RX ORDER — OXYCODONE HYDROCHLORIDE AND ACETAMINOPHEN 5; 325 MG/1; MG/1
1 TABLET ORAL EVERY 6 HOURS PRN
Qty: 120 TABLET | Refills: 0 | Status: SHIPPED | OUTPATIENT
Start: 2020-01-01 | End: 2020-01-22 | Stop reason: SDUPTHER

## 2020-01-22 ENCOUNTER — OFFICE VISIT (OUTPATIENT)
Dept: PAIN MANAGEMENT | Age: 55
End: 2020-01-22
Payer: MEDICARE

## 2020-01-22 VITALS
RESPIRATION RATE: 14 BRPM | SYSTOLIC BLOOD PRESSURE: 134 MMHG | HEART RATE: 68 BPM | HEIGHT: 63 IN | DIASTOLIC BLOOD PRESSURE: 86 MMHG | WEIGHT: 151.9 LBS | BODY MASS INDEX: 26.91 KG/M2

## 2020-01-22 PROCEDURE — 20553 NJX 1/MLT TRIGGER POINTS 3/>: CPT | Performed by: NURSE PRACTITIONER

## 2020-01-22 PROCEDURE — 96372 THER/PROPH/DIAG INJ SC/IM: CPT | Performed by: NURSE PRACTITIONER

## 2020-01-22 PROCEDURE — G8427 DOCREV CUR MEDS BY ELIG CLIN: HCPCS | Performed by: NURSE PRACTITIONER

## 2020-01-22 PROCEDURE — 99214 OFFICE O/P EST MOD 30 MIN: CPT | Performed by: NURSE PRACTITIONER

## 2020-01-22 PROCEDURE — G8419 CALC BMI OUT NRM PARAM NOF/U: HCPCS | Performed by: NURSE PRACTITIONER

## 2020-01-22 PROCEDURE — 4004F PT TOBACCO SCREEN RCVD TLK: CPT | Performed by: NURSE PRACTITIONER

## 2020-01-22 PROCEDURE — 3017F COLORECTAL CA SCREEN DOC REV: CPT | Performed by: NURSE PRACTITIONER

## 2020-01-22 PROCEDURE — G8484 FLU IMMUNIZE NO ADMIN: HCPCS | Performed by: NURSE PRACTITIONER

## 2020-01-22 RX ORDER — OXYCODONE HYDROCHLORIDE AND ACETAMINOPHEN 5; 325 MG/1; MG/1
1 TABLET ORAL EVERY 6 HOURS PRN
Qty: 120 TABLET | Refills: 0 | Status: SHIPPED | OUTPATIENT
Start: 2020-01-30 | End: 2020-02-24 | Stop reason: SDUPTHER

## 2020-01-22 RX ADMIN — TRIAMCINOLONE ACETONIDE 10 MG: 10 INJECTION, SUSPENSION INTRA-ARTICULAR; INTRALESIONAL at 15:25

## 2020-01-23 ASSESSMENT — ENCOUNTER SYMPTOMS
RESPIRATORY NEGATIVE: 1
BACK PAIN: 1

## 2020-01-23 NOTE — PROGRESS NOTES
Janaemagalie Banda  1965 6/4/18      PAIN MANAGEMENT CLINIC PROCEDURE NOTE    CHIEF COMPLAINT: This is a 62 y.o. female patient who presents to the Pain Management Clinic with a history of pain in the upper thoracic. PRE-PROCEDURE DIAGNOSIS: Bilateral myofascial pain    POST-PROCEDURE DIAGNOSIS: same as pre-procedure diagnosis    Vitals:    01/22/20 1500   BP: 134/86   Pulse: 68   Resp: 14       Pain Score:   7    Trigger points were found in the bilateral:   UPPER TRAPEZIUS MUSCLE  LEVATOR SCAPULA MUSCLE  MID/LOWER CERVICAL PARASPINAL MUSCLES    PROCEDURE:     The procedure was explained, including risks and benefits, and the patient has agreed to proceed. The injection site was marked on the patients skin. A large area around the injection site was cleaned with chlora-prep. Omaira Siskin TRIGGER POINT INJECTIONS  A 25G 1.5 inch needle was inserted into each muscle. Depth and direction of the needle were monitored at all times. The needle was advanced until a muscle twitch response was felt and the patient reported concordant pain. The syringe was aspirated and was negative for heme or air. Then, a combination of 1ml of 2%lidocaine, and 10mg of kenalog (NDC# 3608-2047-25) was injected. The needle was then moved in and out of the muscle at the same depth to break up additional muscle spasms. A total of 6 trigger points were injected. The injection site was cleaned and dressed with a spot band aid. The patient tolerated the procedure well and with out complication The patient was instructed avoid heat and excessive activity for 24-48 hours.

## 2020-01-23 NOTE — PROGRESS NOTES
2014       Family History   Problem Relation Age of Onset    Cancer Brother         lung       Social History     Socioeconomic History    Marital status: Single     Spouse name: None    Number of children: None    Years of education: None    Highest education level: None   Occupational History    None   Social Needs    Financial resource strain: None    Food insecurity:     Worry: None     Inability: None    Transportation needs:     Medical: None     Non-medical: None   Tobacco Use    Smoking status: Current Every Day Smoker     Packs/day: 1.00     Types: Cigarettes    Smokeless tobacco: Never Used   Substance and Sexual Activity    Alcohol use: Yes     Alcohol/week: 4.0 standard drinks     Types: 4 Shots of liquor per week    Drug use: No    Sexual activity: Yes     Partners: Male   Lifestyle    Physical activity:     Days per week: None     Minutes per session: None    Stress: None   Relationships    Social connections:     Talks on phone: None     Gets together: None     Attends Shinto service: None     Active member of club or organization: None     Attends meetings of clubs or organizations: None     Relationship status: None    Intimate partner violence:     Fear of current or ex partner: None     Emotionally abused: None     Physically abused: None     Forced sexual activity: None   Other Topics Concern    None   Social History Narrative    None     Review of Systems   Constitutional: Positive for activity change (limited) and fatigue. Respiratory: Negative. Cardiovascular: Negative. Genitourinary: Negative. Musculoskeletal: Positive for back pain, myalgias and neck pain. Neurological: Negative for weakness and numbness. Psychiatric/Behavioral: Positive for sleep disturbance. History anxiety       Objective:   Physical Exam  Vitals signs reviewed. Constitutional:       General: She is not in acute distress. Appearance: She is well-developed.  She is not ill-appearing, toxic-appearing or diaphoretic. Interventions: She is not intubated. HENT:      Head: Normocephalic and atraumatic. Cardiovascular:      Rate and Rhythm: Normal rate. Pulmonary:      Effort: Pulmonary effort is normal. No tachypnea, bradypnea, accessory muscle usage, prolonged expiration, respiratory distress or retractions. She is not intubated. Musculoskeletal:        Back:    Skin:     General: Skin is warm and dry. Capillary Refill: Capillary refill takes less than 2 seconds. Coloration: Skin is not pale. Nails: There is no clubbing. Neurological:      Mental Status: She is alert and oriented to person, place, and time. She is not disoriented. GCS: GCS eye subscore is 4. GCS verbal subscore is 5. GCS motor subscore is 6. Cranial Nerves: No cranial nerve deficit. Psychiatric:         Mood and Affect: Mood and affect normal.         Speech: She is communicative. Behavior: Behavior normal. Behavior is not agitated, aggressive or withdrawn. Behavior is cooperative. Cognition and Memory: Cognition and memory normal.         Judgment: Judgment normal. Judgment is not impulsive or inappropriate. Assessment:      1. Cervicalgia    2. Fibromyalgia    3. Myofascial pain    4. Chronic pain of multiple joints          Plan:       Chronic pain diagnoses such as   1. Cervicalgia    2. Fibromyalgia    3. Myofascial pain    4. Chronic pain of multiple joints     controlled on current medication regime, wll continue current pain medications to improve quality of life and function. No aberrant behaviors noted. TPI's given at today's visit, please see separate note    Controlled Substances Monitoring:     RX Monitoring 9/30/2019   Attestation -   Periodic Controlled Substance Monitoring No signs of potential drug abuse or diversion identified.    Chronic Pain > 50 MEDD -   Chronic Pain > 80 MEDD -      follow up two months     Ksenia Champagne APRN - CNP

## 2020-02-24 RX ORDER — OXYCODONE HYDROCHLORIDE AND ACETAMINOPHEN 5; 325 MG/1; MG/1
1 TABLET ORAL EVERY 6 HOURS PRN
Qty: 120 TABLET | Refills: 0 | Status: SHIPPED | OUTPATIENT
Start: 2020-02-29 | End: 2020-03-23 | Stop reason: SDUPTHER

## 2020-02-24 NOTE — TELEPHONE ENCOUNTER
Last Appt:  1/22/2020  Next Appt:   3/19/2020  Med verified in [facility] checked for [place], and [place]: 01/30/2020 percocet #120. Due 02/29/2020.

## 2020-03-23 RX ORDER — OXYCODONE HYDROCHLORIDE AND ACETAMINOPHEN 5; 325 MG/1; MG/1
1 TABLET ORAL EVERY 6 HOURS PRN
Qty: 120 TABLET | Refills: 0 | Status: SHIPPED | OUTPATIENT
Start: 2020-03-30 | End: 2020-04-24 | Stop reason: SDUPTHER

## 2020-03-23 RX ORDER — TIZANIDINE 4 MG/1
4 TABLET ORAL 3 TIMES DAILY
Qty: 90 TABLET | Refills: 5 | Status: SHIPPED | OUTPATIENT
Start: 2020-03-23 | End: 2022-04-13

## 2020-04-24 RX ORDER — OXYCODONE HYDROCHLORIDE AND ACETAMINOPHEN 5; 325 MG/1; MG/1
1 TABLET ORAL EVERY 6 HOURS PRN
Qty: 120 TABLET | Refills: 0 | Status: SHIPPED | OUTPATIENT
Start: 2020-04-29 | End: 2020-05-19 | Stop reason: SDUPTHER

## 2020-04-24 NOTE — TELEPHONE ENCOUNTER
Last Appt:  1/22/2020  Next Appt:   5/27/2020  Med verified in [address] [place] [address] checked for [place], and [place]: 03/30/20 Percocet 5/325 #120. Due 04/29/20 .

## 2020-05-19 RX ORDER — OXYCODONE HYDROCHLORIDE AND ACETAMINOPHEN 5; 325 MG/1; MG/1
1 TABLET ORAL EVERY 6 HOURS PRN
Qty: 120 TABLET | Refills: 0 | Status: SHIPPED | OUTPATIENT
Start: 2020-05-30 | End: 2020-06-23 | Stop reason: SDUPTHER

## 2020-05-19 RX ORDER — IBUPROFEN 800 MG/1
800 TABLET ORAL EVERY 6 HOURS PRN
Qty: 120 TABLET | Refills: 5 | Status: SHIPPED | OUTPATIENT
Start: 2020-05-19 | End: 2020-11-02 | Stop reason: SDUPTHER

## 2020-05-27 ENCOUNTER — VIRTUAL VISIT (OUTPATIENT)
Dept: PAIN MANAGEMENT | Age: 55
End: 2020-05-27
Payer: MEDICARE

## 2020-05-27 PROCEDURE — 99214 OFFICE O/P EST MOD 30 MIN: CPT | Performed by: NURSE PRACTITIONER

## 2020-05-27 PROCEDURE — G8428 CUR MEDS NOT DOCUMENT: HCPCS | Performed by: NURSE PRACTITIONER

## 2020-05-27 PROCEDURE — 3017F COLORECTAL CA SCREEN DOC REV: CPT | Performed by: NURSE PRACTITIONER

## 2020-05-27 ASSESSMENT — ENCOUNTER SYMPTOMS
BACK PAIN: 1
RESPIRATORY NEGATIVE: 1

## 2020-05-27 NOTE — PROGRESS NOTES
2020    TELEHEALTH EVALUATION -- Audio/Visual (During PZYLL-83 public health emergency)    HPI:    Ely Adrian (:  1965) has requested an audio/video evaluation for the following concern(s):    Chronic pain management. Myofascial pain, knee pain-worse when going upstairs. Percocet prn, feels like it could be stronger. No side effects. Undergoes knee injections, TPI's as well. Review of Systems   Constitutional: Positive for activity change (limited) and fatigue. Respiratory: Negative. Cardiovascular: Negative. Genitourinary: Negative. Musculoskeletal: Positive for back pain, myalgias and neck pain. Neurological: Negative for weakness and numbness. Psychiatric/Behavioral: Positive for sleep disturbance. History anxiety     Prior to Visit Medications    Medication Sig Taking? Authorizing Provider   ibuprofen (ADVIL;MOTRIN) 800 MG tablet Take 1 tablet by mouth every 6 hours as needed for Pain  JACKLYN Arroyo - [facility]   oxyCODONE-acetaminophen (PERCOCET) 5-325 MG per tablet Take 1 tablet by mouth every 6 hours as needed for Pain for up to 30 days. JACKLYN Arroyo CNP   tiZANidine (ZANAFLEX) 4 MG tablet Take 1 tablet by mouth 3 times daily  Terese Westbrook MD   desvenlafaxine succinate (PRISTIQ) 100 MG TB24 extended release tablet Take 100 mg by mouth daily  Historical Provider, MD   busPIRone (BUSPAR) 10 MG tablet Take 10 mg by mouth  Historical Provider, MD   traZODone (DESYREL) 100 MG tablet Take 100 mg by mouth   Historical Provider, MD       Social History     Tobacco Use    Smoking status: Current Every Day Smoker     Packs/day: 1.00     Types: Cigarettes    Smokeless tobacco: Never Used   Substance Use Topics    Alcohol use:  Yes     Alcohol/week: 4.0 standard drinks     Types: 4 Shots of liquor per week    Drug use: No        No Known Allergies,   Past Medical History:   Diagnosis Date    Anxiety     Arthritis     CMC (carpometacarpal joint) dislocation, left, sequela     DJD (degenerative joint disease) of cervical spine     Fibromyalgia    ,   Past Surgical History:   Procedure Laterality Date    BREAST SURGERY  2002     SECTION      times 2    ENDOMETRIAL ABLATION  2012    HAND SURGERY Left 2017    SHOULDER SURGERY Right 2014   ,   Social History     Tobacco Use    Smoking status: Current Every Day Smoker     Packs/day: 1.00     Types: Cigarettes    Smokeless tobacco: Never Used   Substance Use Topics    Alcohol use:  Yes     Alcohol/week: 4.0 standard drinks     Types: 4 Shots of liquor per week    Drug use: No   ,   Family History   Problem Relation Age of Onset    Cancer Brother         lung   ,   There is no immunization history on file for this patient.,   Health Maintenance   Topic Date Due    Hepatitis C screen  1965    Pneumococcal 0-64 years Vaccine (1 of 1 - PPSV23) 1969    HIV screen  1978    DTaP/Tdap/Td vaccine (1 - Tdap) 1982    Cervical cancer screen  1984    Lipid screen  2003    Diabetes screen  2003    Breast cancer screen  2013    Shingles Vaccine (1 of 2) 2013    Colon cancer screen colonoscopy  2013    Annual Wellness Visit (AWV)  2019    Flu vaccine  Completed    Hepatitis A vaccine  Aged Out    Hepatitis B vaccine  Aged Out    Hib vaccine  Aged Out    Meningococcal (ACWY) vaccine  Aged Out       PHYSICAL EXAMINATION:  [ INSTRUCTIONS:  \"[x]\" Indicates a positive item  \"[]\" Indicates a negative item  -- DELETE ALL ITEMS NOT EXAMINED]  Vital Signs: (As obtained by patient/caregiver or practitioner observation)    Blood pressure-  Heart rate-    Respiratory rate-    Temperature-  Pulse oximetry-     Constitutional: [x] Appears well-developed and well-nourished [x] No apparent distress      [] Abnormal-   Mental status  [x] Alert and awake  [x] Oriented to person/place/time [x]Able to follow commands      Eyes:  EOM    [x]  Normal [] Abnormal-  Sclera  [x]  Normal  [] Abnormal -         Discharge [x]  None visible  [] Abnormal -    HENT:   [x] Normocephalic, atraumatic. [] Abnormal   [x] Mouth/Throat: Mucous membranes are moist.     External Ears [x] Normal  [] Abnormal-     Neck: [x] No visualized mass     Pulmonary/Chest: [x] Respiratory effort normal.  [x] No visualized signs of difficulty breathing or respiratory distress        [] Abnormal-      Musculoskeletal:   [] Normal gait with no signs of ataxia         [x] Normal range of motion of neck        [] Abnormal-       Neurological:        [x] No Facial Asymmetry (Cranial nerve 7 motor function) (limited exam to video visit)          [x] No gaze palsy        [] Abnormal-         Skin:        [x] No significant exanthematous lesions or discoloration noted on facial skin         [] Abnormal-            Psychiatric:       [x] Normal Affect [x] No Hallucinations        [] Abnormal-     Other pertinent observable physical exam findings-     ASSESSMENT/PLAN:   Diagnosis Orders   1. Chronic pain of multiple joints     2. Cervicalgia     3. Myofascial pain     4. Fibromyalgia     5. Encounter for chronic pain management       Chronic pain diagnoses such as   1. Chronic pain of multiple joints    2. Cervicalgia    3. Myofascial pain    4. Fibromyalgia    5. Encounter for chronic pain management     controlled on current medication regime, wll continue current pain medications to improve quality of life and function. Consider repeating knee injections and TPI's  Controlled Substance Monitoring:    Acute and Chronic Pain Monitoring:   RX Monitoring 5/27/2020   Attestation -   Periodic Controlled Substance Monitoring No signs of potential drug abuse or diversion identified. Chronic Pain > 50 MEDD -   Chronic Pain > 80 MEDD -     No follow-ups on file. Carolyne Varela is a 62 y.o. female being evaluated by a Virtual Visit (video visit) encounter to address concerns as mentioned above.   YANIRA

## 2020-05-29 ENCOUNTER — TELEPHONE (OUTPATIENT)
Dept: PAIN MANAGEMENT | Age: 55
End: 2020-05-29

## 2020-05-29 NOTE — TELEPHONE ENCOUNTER
Dr Ashby Like office called stating that the patient has been getting knee injections at their office and is scheduled for injections today. Caller is wondering if this is still okay?   Their office is [phone]

## 2020-06-23 RX ORDER — OXYCODONE HYDROCHLORIDE AND ACETAMINOPHEN 5; 325 MG/1; MG/1
1 TABLET ORAL EVERY 6 HOURS PRN
Qty: 120 TABLET | Refills: 0 | Status: SHIPPED | OUTPATIENT
Start: 2020-06-29 | End: 2020-08-24 | Stop reason: SDUPTHER

## 2020-07-20 ENCOUNTER — HOSPITAL ENCOUNTER (OUTPATIENT)
Age: 55
Setting detail: SPECIMEN
Discharge: HOME | End: 2020-07-20
Payer: MEDICARE

## 2020-07-20 ENCOUNTER — OFFICE VISIT (OUTPATIENT)
Dept: PAIN MANAGEMENT | Age: 55
End: 2020-07-20
Payer: MEDICARE

## 2020-07-20 VITALS
WEIGHT: 155.6 LBS | BODY MASS INDEX: 27.57 KG/M2 | DIASTOLIC BLOOD PRESSURE: 94 MMHG | HEIGHT: 63 IN | RESPIRATION RATE: 16 BRPM | SYSTOLIC BLOOD PRESSURE: 140 MMHG

## 2020-07-20 PROCEDURE — 99214 OFFICE O/P EST MOD 30 MIN: CPT | Performed by: NURSE PRACTITIONER

## 2020-07-20 PROCEDURE — G8427 DOCREV CUR MEDS BY ELIG CLIN: HCPCS | Performed by: NURSE PRACTITIONER

## 2020-07-20 PROCEDURE — 3017F COLORECTAL CA SCREEN DOC REV: CPT | Performed by: NURSE PRACTITIONER

## 2020-07-20 PROCEDURE — G8419 CALC BMI OUT NRM PARAM NOF/U: HCPCS | Performed by: NURSE PRACTITIONER

## 2020-07-20 PROCEDURE — 80307 DRUG TEST PRSMV CHEM ANLYZR: CPT

## 2020-07-20 PROCEDURE — 99213 OFFICE O/P EST LOW 20 MIN: CPT

## 2020-07-20 PROCEDURE — 4004F PT TOBACCO SCREEN RCVD TLK: CPT | Performed by: NURSE PRACTITIONER

## 2020-07-20 RX ORDER — HYDROCODONE BITARTRATE AND ACETAMINOPHEN 7.5; 325 MG/1; MG/1
1 TABLET ORAL EVERY 6 HOURS PRN
Qty: 120 TABLET | Refills: 0 | Status: SHIPPED | OUTPATIENT
Start: 2020-07-29 | End: 2020-08-28

## 2020-07-20 ASSESSMENT — ENCOUNTER SYMPTOMS
RESPIRATORY NEGATIVE: 1
BACK PAIN: 1

## 2020-07-20 NOTE — PROGRESS NOTES
Procedure Laterality Date    BREAST SURGERY  2002     SECTION      times 2    ENDOMETRIAL ABLATION  2012    HAND SURGERY Left 2017    SHOULDER SURGERY Right 2014       Family History   Problem Relation Age of Onset    Cancer Brother         lung       Social History     Socioeconomic History    Marital status: Single     Spouse name: None    Number of children: None    Years of education: None    Highest education level: None   Occupational History    None   Social Needs    Financial resource strain: None    Food insecurity     Worry: None     Inability: None    Transportation needs     Medical: None     Non-medical: None   Tobacco Use    Smoking status: Current Every Day Smoker     Packs/day: 1.00     Types: Cigarettes    Smokeless tobacco: Never Used   Substance and Sexual Activity    Alcohol use: Yes     Alcohol/week: 4.0 standard drinks     Types: 4 Shots of liquor per week    Drug use: No    Sexual activity: Yes     Partners: Male   Lifestyle    Physical activity     Days per week: None     Minutes per session: None    Stress: None   Relationships    Social connections     Talks on phone: None     Gets together: None     Attends Yazdanism service: None     Active member of club or organization: None     Attends meetings of clubs or organizations: None     Relationship status: None    Intimate partner violence     Fear of current or ex partner: None     Emotionally abused: None     Physically abused: None     Forced sexual activity: None   Other Topics Concern    None   Social History Narrative    None     Review of Systems   Constitutional: Positive for activity change (limited) and fatigue. Respiratory: Negative. Cardiovascular: Negative. Genitourinary: Negative. Musculoskeletal: Positive for back pain, myalgias and neck pain. Neurological: Negative for weakness and numbness. Psychiatric/Behavioral: Positive for sleep disturbance.         History anxiety

## 2020-07-23 LAB
6-ACETYLMORPHINE, UR: NOT DETECTED
7-AMINOCLONAZEPAM, URINE: NOT DETECTED
ALPHA-OH-ALPRAZ, URINE: NOT DETECTED
ALPRAZOLAM, URINE: NOT DETECTED
AMPHETAMINES, URINE: NOT DETECTED
BARBITURATES, URINE: NOT DETECTED
BENZOYLECGONINE, UR: NOT DETECTED
BUPRENORPHINE URINE: NOT DETECTED
CARISOPRODOL, UR: NOT DETECTED
CLONAZEPAM, URINE: NOT DETECTED
CODEINE, URINE: NOT DETECTED
CREATININE URINE: 110 MG/DL (ref 20–400)
DIAZEPAM, URINE: NOT DETECTED
EER PAIN MGT DRUG PANEL, HIGH RES/EMIT U: NORMAL
ETHYL GLUCURONIDE UR: PRESENT
FENTANYL URINE: NOT DETECTED
HYDROCODONE, URINE: NOT DETECTED
HYDROMORPHONE, URINE: NOT DETECTED
LORAZEPAM, URINE: NOT DETECTED
MARIJUANA METAB, UR: NOT DETECTED
MDA, UR: NOT DETECTED
MDEA, EVE, UR: NOT DETECTED
MDMA URINE: NOT DETECTED
MEPERIDINE METAB, UR: NOT DETECTED
METHADONE, URINE: NOT DETECTED
METHAMPHETAMINE, URINE: NOT DETECTED
METHYLPHENIDATE: NOT DETECTED
MIDAZOLAM, URINE: NOT DETECTED
MORPHINE URINE: NOT DETECTED
NORBUPRENORPHINE, URINE: NOT DETECTED
NORDIAZEPAM, URINE: NOT DETECTED
NORFENTANYL, URINE: NOT DETECTED
NORHYDROCODONE, URINE: NOT DETECTED
NOROXYCODONE, URINE: PRESENT
NOROXYMORPHONE, URINE: NOT DETECTED
OXAZEPAM, URINE: NOT DETECTED
OXYCODONE URINE: PRESENT
OXYMORPHONE, URINE: NOT DETECTED
PAIN MGT DRUG PANEL, HI RES, UR: NORMAL
PCP,URINE: NOT DETECTED
PHENTERMINE, UR: NOT DETECTED
PROPOXYPHENE, URINE: NOT DETECTED
TAPENTADOL, URINE: NOT DETECTED
TAPENTADOL-O-SULFATE, URINE: NOT DETECTED
TEMAZEPAM, URINE: NOT DETECTED
TRAMADOL, URINE: NOT DETECTED
ZOLPIDEM, URINE: NOT DETECTED

## 2020-08-24 RX ORDER — OXYCODONE HYDROCHLORIDE AND ACETAMINOPHEN 5; 325 MG/1; MG/1
1 TABLET ORAL EVERY 6 HOURS PRN
Qty: 120 TABLET | Refills: 0 | Status: SHIPPED | OUTPATIENT
Start: 2020-08-24 | End: 2020-09-21

## 2020-08-24 NOTE — TELEPHONE ENCOUNTER
Last Appt:  7/20/2020  Next Appt:   9/21/2020  Med verified in [address] checked for [place], and [place]: 06/29/2020 percocet #120. Due now. Pt is asking for her medication to be switched back to percocet. She feels she does better on this medication then the norco, she says she is hurting a lot more with it. Pt needs a refill if applicable. normal

## 2020-09-21 ENCOUNTER — OFFICE VISIT (OUTPATIENT)
Dept: PAIN MANAGEMENT | Age: 55
End: 2020-09-21
Payer: MEDICARE

## 2020-09-21 VITALS
BODY MASS INDEX: 27.32 KG/M2 | SYSTOLIC BLOOD PRESSURE: 146 MMHG | DIASTOLIC BLOOD PRESSURE: 86 MMHG | WEIGHT: 154.2 LBS | HEART RATE: 68 BPM | HEIGHT: 63 IN

## 2020-09-21 PROCEDURE — 4004F PT TOBACCO SCREEN RCVD TLK: CPT | Performed by: NURSE PRACTITIONER

## 2020-09-21 PROCEDURE — G8427 DOCREV CUR MEDS BY ELIG CLIN: HCPCS | Performed by: NURSE PRACTITIONER

## 2020-09-21 PROCEDURE — G8419 CALC BMI OUT NRM PARAM NOF/U: HCPCS | Performed by: NURSE PRACTITIONER

## 2020-09-21 PROCEDURE — 99214 OFFICE O/P EST MOD 30 MIN: CPT | Performed by: NURSE PRACTITIONER

## 2020-09-21 PROCEDURE — 99214 OFFICE O/P EST MOD 30 MIN: CPT

## 2020-09-21 PROCEDURE — 3017F COLORECTAL CA SCREEN DOC REV: CPT | Performed by: NURSE PRACTITIONER

## 2020-09-21 RX ORDER — OXYCODONE AND ACETAMINOPHEN 7.5; 325 MG/1; MG/1
1 TABLET ORAL EVERY 6 HOURS PRN
Qty: 120 TABLET | Refills: 0 | Status: SHIPPED | OUTPATIENT
Start: 2020-09-21 | End: 2020-10-20 | Stop reason: SDUPTHER

## 2020-09-21 ASSESSMENT — ENCOUNTER SYMPTOMS
BACK PAIN: 1
RESPIRATORY NEGATIVE: 1

## 2020-09-21 NOTE — PROGRESS NOTES
Subjective:      Patient ID: Ariadne Jacobson is a 62 y.o. female. Chief Complaint   Patient presents with    Pain     2 month fibromyalgia. pt states increased pain in knees lately pt got injections from Dr Legrand Kanner office last month in both knees      Neck Pain    Pertinent negatives include no numbness or weakness. Here today for routine pain clinic recheck, chronic myofascial pain, chronic joint pain. Percocet more effective than Norco, denies side effects. Pain Assessment  Location of Pain: Other (Comment)(fibromyalgia)  Location Modifiers: Left, Right, Inferior, Medial, Lateral, Anterior, Posterior, Superior  Severity of Pain: 8  Quality of Pain: Aching, Sharp, Dull  Duration of Pain: Persistent  Frequency of Pain: Constant  Aggravating Factors: Exercise, Bending, Stretching, Straightening, Squatting, Standing, Kneeling, Walking, Stairs  Limiting Behavior: Yes  Relieving Factors: Rest, Heat, Other (Comment)(TENS)  Result of Injury: No  Work-Related Injury: No  Are there other pain locations you wish to document?: No    No Known Allergies    Outpatient Medications Marked as Taking for the 9/21/20 encounter (Office Visit) with JACKLYN King CNP   Medication Sig Dispense Refill    oxyCODONE-acetaminophen (PERCOCET) 7.5-325 MG per tablet Take 1 tablet by mouth every 6 hours as needed for Pain for up to 30 days.  Intended supply: 30 days 120 tablet 0    ibuprofen (ADVIL;MOTRIN) 800 MG tablet Take 1 tablet by mouth every 6 hours as needed for Pain 120 tablet 5    tiZANidine (ZANAFLEX) 4 MG tablet Take 1 tablet by mouth 3 times daily 90 tablet 5    desvenlafaxine succinate (PRISTIQ) 100 MG TB24 extended release tablet Take 100 mg by mouth daily      busPIRone (BUSPAR) 10 MG tablet Take 10 mg by mouth         Past Medical History:   Diagnosis Date    Anxiety     Arthritis     CMC (carpometacarpal joint) dislocation, left, sequela     DJD (degenerative joint disease) of cervical spine  Fibromyalgia        Past Surgical History:   Procedure Laterality Date    BREAST SURGERY  2002     SECTION      times 2    ENDOMETRIAL ABLATION  2012    HAND SURGERY Left 2017    SHOULDER SURGERY Right 2014       Family History   Problem Relation Age of Onset    Cancer Brother         lung       Social History     Socioeconomic History    Marital status:      Spouse name: None    Number of children: None    Years of education: None    Highest education level: None   Occupational History    None   Social Needs    Financial resource strain: None    Food insecurity     Worry: None     Inability: None    Transportation needs     Medical: None     Non-medical: None   Tobacco Use    Smoking status: Current Every Day Smoker     Packs/day: 1.00     Types: Cigarettes    Smokeless tobacco: Never Used   Substance and Sexual Activity    Alcohol use: Yes     Alcohol/week: 4.0 standard drinks     Types: 4 Shots of liquor per week    Drug use: No    Sexual activity: Yes     Partners: Male   Lifestyle    Physical activity     Days per week: None     Minutes per session: None    Stress: None   Relationships    Social connections     Talks on phone: None     Gets together: None     Attends Muslim service: None     Active member of club or organization: None     Attends meetings of clubs or organizations: None     Relationship status: None    Intimate partner violence     Fear of current or ex partner: None     Emotionally abused: None     Physically abused: None     Forced sexual activity: None   Other Topics Concern    None   Social History Narrative    None     Review of Systems   Constitutional: Positive for activity change (limited) and fatigue. Respiratory: Negative. Cardiovascular: Negative. Genitourinary: Negative. Musculoskeletal: Positive for back pain, myalgias and neck pain. Neurological: Negative for weakness and numbness.    Psychiatric/Behavioral: Positive for

## 2020-10-20 RX ORDER — OXYCODONE AND ACETAMINOPHEN 7.5; 325 MG/1; MG/1
1 TABLET ORAL EVERY 6 HOURS PRN
Qty: 120 TABLET | Refills: 0 | Status: SHIPPED | OUTPATIENT
Start: 2020-10-24 | End: 2020-11-20 | Stop reason: SDUPTHER

## 2020-11-02 NOTE — TELEPHONE ENCOUNTER
Last Appt:  9/21/2020  Next Appt:   11/20/2020  Med verified in Epic      Patient called and is requesting 800mg ibuprofen and is wanting a 3 month follow

## 2020-11-03 RX ORDER — IBUPROFEN 800 MG/1
800 TABLET ORAL EVERY 6 HOURS PRN
Qty: 360 TABLET | Refills: 3 | Status: SHIPPED | OUTPATIENT
Start: 2020-11-03 | End: 2021-07-15

## 2020-11-20 ENCOUNTER — OFFICE VISIT (OUTPATIENT)
Dept: PAIN MANAGEMENT | Age: 55
End: 2020-11-20
Payer: MEDICARE

## 2020-11-20 ENCOUNTER — HOSPITAL ENCOUNTER (OUTPATIENT)
Age: 55
Setting detail: SPECIMEN
Discharge: HOME | End: 2020-11-20
Payer: MEDICARE

## 2020-11-20 ENCOUNTER — NURSE ONLY (OUTPATIENT)
Dept: LAB | Age: 55
End: 2020-11-20
Payer: MEDICARE

## 2020-11-20 VITALS
BODY MASS INDEX: 27.29 KG/M2 | SYSTOLIC BLOOD PRESSURE: 138 MMHG | DIASTOLIC BLOOD PRESSURE: 88 MMHG | HEIGHT: 63 IN | WEIGHT: 154 LBS

## 2020-11-20 PROCEDURE — 99213 OFFICE O/P EST LOW 20 MIN: CPT

## 2020-11-20 PROCEDURE — G8419 CALC BMI OUT NRM PARAM NOF/U: HCPCS | Performed by: NURSE PRACTITIONER

## 2020-11-20 PROCEDURE — 80307 DRUG TEST PRSMV CHEM ANLYZR: CPT

## 2020-11-20 PROCEDURE — 96372 THER/PROPH/DIAG INJ SC/IM: CPT

## 2020-11-20 PROCEDURE — 4004F PT TOBACCO SCREEN RCVD TLK: CPT | Performed by: NURSE PRACTITIONER

## 2020-11-20 PROCEDURE — 99214 OFFICE O/P EST MOD 30 MIN: CPT | Performed by: NURSE PRACTITIONER

## 2020-11-20 PROCEDURE — 3017F COLORECTAL CA SCREEN DOC REV: CPT | Performed by: NURSE PRACTITIONER

## 2020-11-20 PROCEDURE — G8427 DOCREV CUR MEDS BY ELIG CLIN: HCPCS | Performed by: NURSE PRACTITIONER

## 2020-11-20 PROCEDURE — G8484 FLU IMMUNIZE NO ADMIN: HCPCS | Performed by: NURSE PRACTITIONER

## 2020-11-20 RX ORDER — OXYCODONE AND ACETAMINOPHEN 7.5; 325 MG/1; MG/1
1 TABLET ORAL EVERY 6 HOURS PRN
Qty: 120 TABLET | Refills: 0 | Status: SHIPPED | OUTPATIENT
Start: 2020-11-23 | End: 2020-12-17 | Stop reason: SDUPTHER

## 2020-11-20 RX ORDER — TRIAMCINOLONE ACETONIDE 40 MG/ML
40 INJECTION, SUSPENSION INTRA-ARTICULAR; INTRAMUSCULAR ONCE
Status: COMPLETED | OUTPATIENT
Start: 2020-11-20 | End: 2020-11-20

## 2020-11-20 RX ADMIN — TRIAMCINOLONE ACETONIDE 40 MG: 40 INJECTION, SUSPENSION INTRA-ARTICULAR; INTRAMUSCULAR at 15:23

## 2020-11-20 ASSESSMENT — ENCOUNTER SYMPTOMS
RESPIRATORY NEGATIVE: 1
BACK PAIN: 1

## 2020-11-20 NOTE — PROGRESS NOTES
times 2    ENDOMETRIAL ABLATION  2012    HAND SURGERY Left 2017    SHOULDER SURGERY Right 2014       Family History   Problem Relation Age of Onset    Cancer Brother         lung       Social History     Socioeconomic History    Marital status:      Spouse name: None    Number of children: None    Years of education: None    Highest education level: None   Occupational History    None   Social Needs    Financial resource strain: None    Food insecurity     Worry: None     Inability: None    Transportation needs     Medical: None     Non-medical: None   Tobacco Use    Smoking status: Current Every Day Smoker     Packs/day: 1.00     Types: Cigarettes    Smokeless tobacco: Never Used   Substance and Sexual Activity    Alcohol use: Yes     Alcohol/week: 4.0 standard drinks     Types: 4 Shots of liquor per week    Drug use: No    Sexual activity: Yes     Partners: Male   Lifestyle    Physical activity     Days per week: None     Minutes per session: None    Stress: None   Relationships    Social connections     Talks on phone: None     Gets together: None     Attends Evangelical service: None     Active member of club or organization: None     Attends meetings of clubs or organizations: None     Relationship status: None    Intimate partner violence     Fear of current or ex partner: None     Emotionally abused: None     Physically abused: None     Forced sexual activity: None   Other Topics Concern    None   Social History Narrative    None     Review of Systems   Constitutional: Positive for activity change (limited) and fatigue. Respiratory: Negative. Cardiovascular: Negative. Genitourinary: Negative. Musculoskeletal: Positive for back pain, myalgias and neck pain. Neurological: Negative for weakness and numbness. Psychiatric/Behavioral: Positive for sleep disturbance. History anxiety       Objective:   Physical Exam  Vitals signs reviewed.    Constitutional: General: She is not in acute distress. Appearance: She is well-developed. She is not ill-appearing, toxic-appearing or diaphoretic. Interventions: She is not intubated. HENT:      Head: Normocephalic and atraumatic. Cardiovascular:      Rate and Rhythm: Normal rate. Pulmonary:      Effort: Pulmonary effort is normal. No tachypnea, bradypnea, accessory muscle usage, prolonged expiration, respiratory distress or retractions. She is not intubated. Musculoskeletal:        Back:    Skin:     General: Skin is warm and dry. Capillary Refill: Capillary refill takes less than 2 seconds. Coloration: Skin is not pale. Nails: There is no clubbing. Neurological:      Mental Status: She is alert and oriented to person, place, and time. She is not disoriented. GCS: GCS eye subscore is 4. GCS verbal subscore is 5. GCS motor subscore is 6. Cranial Nerves: No cranial nerve deficit. Psychiatric:         Mood and Affect: Mood and affect normal.         Speech: She is communicative. Behavior: Behavior normal. Behavior is not agitated, aggressive or withdrawn. Behavior is cooperative. Cognition and Memory: Cognition and memory normal.         Judgment: Judgment normal. Judgment is not impulsive or inappropriate. Assessment:      1. Encounter for long-term opiate analgesic use    2. Encounter for drug screening    3. Chronic pain of multiple joints    4. Fibromyalgia    5. Myofascial pain          Plan:       Chronic pain diagnoses such as   1. Encounter for long-term opiate analgesic use    2. Encounter for drug screening    3. Chronic pain of multiple joints    4. Fibromyalgia    5. Myofascial pain     controlled on current medication regime, wll continue current pain medications to improve quality of life and function. Kenalog 40mg IM x1  Random drug screen today for opiate medication compliance.       Controlled Substances Monitoring:     RX Monitoring 11/20/2020 Attestation -   Periodic Controlled Substance Monitoring No signs of potential drug abuse or diversion identified.;Obtaining appropriate analgesic effect of treatment.    Chronic Pain > 50 MEDD -   Chronic Pain > 80 MEDD -      follow up two months     JACKLYN Wise - CNP

## 2020-11-24 LAB
6-ACETYLMORPHINE, UR: NOT DETECTED
7-AMINOCLONAZEPAM, URINE: NOT DETECTED
ALPHA-OH-ALPRAZ, URINE: NOT DETECTED
ALPRAZOLAM, URINE: NOT DETECTED
AMPHETAMINES, URINE: NOT DETECTED
BARBITURATES, URINE: NOT DETECTED
BENZOYLECGONINE, UR: NOT DETECTED
BUPRENORPHINE URINE: NOT DETECTED
CARISOPRODOL, UR: NOT DETECTED
CLONAZEPAM, URINE: NOT DETECTED
CODEINE, URINE: NOT DETECTED
CREATININE URINE: 90.5 MG/DL (ref 20–400)
DIAZEPAM, URINE: NOT DETECTED
EER PAIN MGT DRUG PANEL, HIGH RES/EMIT U: NORMAL
ETHYL GLUCURONIDE UR: PRESENT
FENTANYL URINE: NOT DETECTED
HYDROCODONE, URINE: NOT DETECTED
HYDROMORPHONE, URINE: NOT DETECTED
LORAZEPAM, URINE: NOT DETECTED
MARIJUANA METAB, UR: NOT DETECTED
MDA, UR: NOT DETECTED
MDEA, EVE, UR: NOT DETECTED
MDMA URINE: NOT DETECTED
MEPERIDINE METAB, UR: NOT DETECTED
METHADONE, URINE: NOT DETECTED
METHAMPHETAMINE, URINE: NOT DETECTED
METHYLPHENIDATE: NOT DETECTED
MIDAZOLAM, URINE: NOT DETECTED
MORPHINE URINE: NOT DETECTED
NORBUPRENORPHINE, URINE: NOT DETECTED
NORDIAZEPAM, URINE: NOT DETECTED
NORFENTANYL, URINE: NOT DETECTED
NORHYDROCODONE, URINE: NOT DETECTED
NOROXYCODONE, URINE: PRESENT
NOROXYMORPHONE, URINE: NOT DETECTED
OXAZEPAM, URINE: NOT DETECTED
OXYCODONE URINE: PRESENT
OXYMORPHONE, URINE: NOT DETECTED
PAIN MGT DRUG PANEL, HI RES, UR: NORMAL
PCP,URINE: NOT DETECTED
PHENTERMINE, UR: NOT DETECTED
PROPOXYPHENE, URINE: NOT DETECTED
TAPENTADOL, URINE: NOT DETECTED
TAPENTADOL-O-SULFATE, URINE: NOT DETECTED
TEMAZEPAM, URINE: NOT DETECTED
TRAMADOL, URINE: NOT DETECTED
ZOLPIDEM, URINE: NOT DETECTED

## 2020-12-17 RX ORDER — OXYCODONE AND ACETAMINOPHEN 7.5; 325 MG/1; MG/1
1 TABLET ORAL EVERY 6 HOURS PRN
Qty: 120 TABLET | Refills: 0 | Status: SHIPPED | OUTPATIENT
Start: 2020-12-23 | End: 2021-01-15 | Stop reason: SDUPTHER

## 2020-12-17 NOTE — TELEPHONE ENCOUNTER
Last Appt:  11/20/2020  Next Appt:   1/15/2021  [facility] for [place], [place], and [place]: 11/23/20 Percocet 7.5/325 #120. Due 12/23/20.

## 2021-01-29 ENCOUNTER — VIRTUAL VISIT (OUTPATIENT)
Dept: PAIN MANAGEMENT | Age: 56
End: 2021-01-29
Payer: MEDICARE

## 2021-01-29 DIAGNOSIS — M79.18 MYOFASCIAL PAIN: ICD-10-CM

## 2021-01-29 DIAGNOSIS — M54.2 CERVICALGIA: ICD-10-CM

## 2021-01-29 DIAGNOSIS — M79.7 FIBROMYALGIA: Primary | ICD-10-CM

## 2021-01-29 DIAGNOSIS — M25.50 CHRONIC PAIN OF MULTIPLE JOINTS: ICD-10-CM

## 2021-01-29 DIAGNOSIS — G89.29 CHRONIC PAIN OF MULTIPLE JOINTS: ICD-10-CM

## 2021-01-29 PROCEDURE — 99211 OFF/OP EST MAY X REQ PHY/QHP: CPT

## 2021-01-29 PROCEDURE — 99214 OFFICE O/P EST MOD 30 MIN: CPT | Performed by: NURSE PRACTITIONER

## 2021-01-29 PROCEDURE — 3017F COLORECTAL CA SCREEN DOC REV: CPT | Performed by: NURSE PRACTITIONER

## 2021-01-29 PROCEDURE — G8427 DOCREV CUR MEDS BY ELIG CLIN: HCPCS | Performed by: NURSE PRACTITIONER

## 2021-01-29 ASSESSMENT — ENCOUNTER SYMPTOMS
RESPIRATORY NEGATIVE: 1
BACK PAIN: 1

## 2021-01-29 NOTE — PROGRESS NOTES
2021    TELEHEALTH EVALUATION -- Audio/Visual (During  public health emergency)    HPI:    Annalise Schultz (:  1965) has requested an audio/video evaluation for the following concern(s):    Recheck after adding Belbuca to Percocet. Pain seems significantly improved, very satisfied. Activity level as improved as well    Review of Systems   Constitutional: Positive for activity change (increased) and fatigue. Respiratory: Negative. Cardiovascular: Negative. Genitourinary: Negative. Musculoskeletal: Positive for back pain, myalgias and neck pain. Neurological: Negative for weakness and numbness. Psychiatric/Behavioral: Positive for sleep disturbance. History anxiety     Prior to Visit Medications    Medication Sig Taking? Authorizing Provider   buprenorphine (BELBUCA) 75 MCG FILM buccal film Place 1 Film inside cheek every 12 hours for 30 days. JACKLYN Bedoya CNP   oxyCODONE-acetaminophen (PERCOCET) 7.5-325 MG per tablet Take 1 tablet by mouth every 6 hours as needed for Pain for up to 30 days. Intended supply: 30 days  JACKLYN Bedoya CNP   ibuprofen (ADVIL;MOTRIN) 800 MG tablet Take 1 tablet by mouth every 6 hours as needed for Pain  Raf Penaloza MD   tiZANidine (ZANAFLEX) 4 MG tablet Take 1 tablet by mouth 3 times daily  Raf Penaloza MD   desvenlafaxine succinate (PRISTIQ) 100 MG TB24 extended release tablet Take 100 mg by mouth daily  Historical Provider, MD   busPIRone (BUSPAR) 10 MG tablet Take 10 mg by mouth  Historical Provider, MD   traZODone (DESYREL) 100 MG tablet Take 100 mg by mouth   Historical Provider, MD       Social History     Tobacco Use    Smoking status: Current Every Day Smoker     Packs/day: 1.00     Types: Cigarettes    Smokeless tobacco: Never Used   Substance Use Topics    Alcohol use:  Yes     Alcohol/week: 4.0 standard drinks     Types: 4 Shots of liquor per week    Drug use: No        No Known Allergies,   Past Medical History:   Diagnosis Date    Anxiety     Arthritis     CMC (carpometacarpal joint) dislocation, left, sequela     DJD (degenerative joint disease) of cervical spine     Fibromyalgia    ,   Past Surgical History:   Procedure Laterality Date    BREAST SURGERY  2002     SECTION      times 2    ENDOMETRIAL ABLATION  2012    HAND SURGERY Left 2017    SHOULDER SURGERY Right 2014   ,   Social History     Tobacco Use    Smoking status: Current Every Day Smoker     Packs/day: 1.00     Types: Cigarettes    Smokeless tobacco: Never Used   Substance Use Topics    Alcohol use:  Yes     Alcohol/week: 4.0 standard drinks     Types: 4 Shots of liquor per week    Drug use: No   ,   Family History   Problem Relation Age of Onset    Cancer Brother         lung   ,   There is no immunization history on file for this patient.,   Health Maintenance   Topic Date Due    Hepatitis C screen  1965    Pneumococcal 0-64 years Vaccine (1 of 1 - PPSV23) 1969    HIV screen  1978    DTaP/Tdap/Td vaccine (1 - Tdap) 1982    Cervical cancer screen  1984    Lipid screen  2003    Diabetes screen  2003    Breast cancer screen  2013    Shingles Vaccine (1 of 2) 2013    Colon cancer screen colonoscopy  2013    Annual Wellness Visit (AWV)  2019    Flu vaccine  Completed    Hepatitis A vaccine  Aged Out    Hepatitis B vaccine  Aged Out    Hib vaccine  Aged Out    Meningococcal (ACWY) vaccine  Aged Out       PHYSICAL EXAMINATION:  [ INSTRUCTIONS:  \"[x]\" Indicates a positive item  \"[]\" Indicates a negative item  -- DELETE ALL ITEMS NOT EXAMINED]  Vital Signs: (As obtained by patient/caregiver or practitioner observation)    Blood pressure-  Heart rate-    Respiratory rate-    Temperature-  Pulse oximetry-     Constitutional: [x] Appears well-developed and well-nourished [x] No apparent distress      [] Abnormal-   Mental status  [x] Alert and awake Pain > 50 MEDD -   Chronic Pain > 80 MEDD -     Return in about 2 months (around 3/29/2021). Ryann Luu is a 60 y.o. female being evaluated by a Virtual Visit (video visit) encounter to address concerns as mentioned above. A caregiver was present when appropriate. Due to this being a TeleHealth encounter (During [place] [address] public health emergency), evaluation of the following organ systems was limited: Vitals/Constitutional/EENT/Resp/CV/GI//MS/Neuro/Skin/Heme-Lymph-Imm. Pursuant to the emergency declaration under the [address] [place] [address], [address] [place] [address] authority and the [employer] and [employer] Act, this Virtual Visit was conducted with patient's (and/or legal guardian's) consent, to reduce the patient's risk of exposure to COVID-19 and provide necessary medical care. The patient (and/or legal guardian) has also been advised to contact this office for worsening conditions or problems, and seek emergency medical treatment and/or call 911 if deemed necessary. Patient identification was verified at the start of the visit: Yes    Total time spent on this encounter: 13 minutes    Services were provided through a video synchronous discussion virtually to substitute for in-person clinic visit. Patient and provider were located at their individual homes. --JACKLYN Wen - CNP on 1/29/2021 at 2:21 PM    An electronic signature was used to authenticate this note.

## 2021-02-12 DIAGNOSIS — M79.7 FIBROMYALGIA: ICD-10-CM

## 2021-02-12 DIAGNOSIS — M79.18 MYOFASCIAL PAIN: ICD-10-CM

## 2021-02-12 DIAGNOSIS — G89.29 CHRONIC PAIN OF MULTIPLE JOINTS: ICD-10-CM

## 2021-02-12 DIAGNOSIS — M25.50 CHRONIC PAIN OF MULTIPLE JOINTS: ICD-10-CM

## 2021-02-12 RX ORDER — BUPRENORPHINE HYDROCHLORIDE 75 UG/1
75 FILM, SOLUBLE BUCCAL EVERY 12 HOURS
Qty: 60 FILM | Refills: 0 | Status: SHIPPED | OUTPATIENT
Start: 2021-02-18 | End: 2021-03-11 | Stop reason: SDUPTHER

## 2021-02-12 NOTE — TELEPHONE ENCOUNTER
Last Appt:  1/29/2021  Next Appt:   4/6/2021  Med verified in 1 [facility] checked for [place], and [place]: 01/19/21 [facility] #60. Due 02/18/21 .

## 2021-02-15 DIAGNOSIS — G89.29 CHRONIC PAIN OF MULTIPLE JOINTS: ICD-10-CM

## 2021-02-15 DIAGNOSIS — M79.18 MYOFASCIAL PAIN: ICD-10-CM

## 2021-02-15 DIAGNOSIS — M25.50 CHRONIC PAIN OF MULTIPLE JOINTS: ICD-10-CM

## 2021-02-15 DIAGNOSIS — M79.7 FIBROMYALGIA: ICD-10-CM

## 2021-02-15 NOTE — TELEPHONE ENCOUNTER
Last Appt:  1/29/2021  Next Appt:   4/6/2021  [facility] [place], and [place]: 01/22/21 Percocet 7.5/325 #120. Due 02/21/21.

## 2021-02-17 RX ORDER — OXYCODONE AND ACETAMINOPHEN 7.5; 325 MG/1; MG/1
1 TABLET ORAL EVERY 6 HOURS PRN
Qty: 120 TABLET | Refills: 0 | Status: SHIPPED | OUTPATIENT
Start: 2021-02-21 | End: 2021-02-19 | Stop reason: SDUPTHER

## 2021-02-19 RX ORDER — OXYCODONE AND ACETAMINOPHEN 7.5; 325 MG/1; MG/1
1 TABLET ORAL EVERY 6 HOURS PRN
Qty: 120 TABLET | Refills: 0 | Status: SHIPPED | OUTPATIENT
Start: 2021-02-20 | End: 2021-03-11 | Stop reason: SDUPTHER

## 2021-02-19 NOTE — TELEPHONE ENCOUNTER
Last Appt:  1/29/2021  Next Appt:   4/6/2021  Med verified in Epic      Pt called, pharmacy is not open Sunday would like Rx picked up on Saturday.

## 2021-03-11 DIAGNOSIS — M79.7 FIBROMYALGIA: ICD-10-CM

## 2021-03-11 DIAGNOSIS — G89.29 CHRONIC PAIN OF MULTIPLE JOINTS: ICD-10-CM

## 2021-03-11 DIAGNOSIS — M79.18 MYOFASCIAL PAIN: ICD-10-CM

## 2021-03-11 DIAGNOSIS — M25.50 CHRONIC PAIN OF MULTIPLE JOINTS: ICD-10-CM

## 2021-03-11 RX ORDER — OXYCODONE AND ACETAMINOPHEN 7.5; 325 MG/1; MG/1
1 TABLET ORAL EVERY 6 HOURS PRN
Qty: 120 TABLET | Refills: 0 | Status: SHIPPED | OUTPATIENT
Start: 2021-03-18 | End: 2021-04-06 | Stop reason: SDUPTHER

## 2021-03-11 RX ORDER — BUPRENORPHINE HYDROCHLORIDE 75 UG/1
75 FILM, SOLUBLE BUCCAL EVERY 12 HOURS
Qty: 60 FILM | Refills: 0 | Status: SHIPPED | OUTPATIENT
Start: 2021-03-22 | End: 2021-04-21

## 2021-03-11 NOTE — TELEPHONE ENCOUNTER
[facility] Report checked for [place], and [place]: 2/20/2021 percocet 7.5-325mg #120. Due 3/22/2021.          2/16/2021 Belbuca 75mcg/hr #60. Due 3/18/2021.     Last Appt:  1/29/2021  Next Appt:   4/6/2021  Med verified in [facility] [address]

## 2021-04-06 ENCOUNTER — OFFICE VISIT (OUTPATIENT)
Dept: PAIN MANAGEMENT | Age: 56
End: 2021-04-06
Payer: MEDICARE

## 2021-04-06 VITALS — RESPIRATION RATE: 14 BRPM | SYSTOLIC BLOOD PRESSURE: 138 MMHG | DIASTOLIC BLOOD PRESSURE: 70 MMHG

## 2021-04-06 DIAGNOSIS — M79.18 MYOFASCIAL PAIN: ICD-10-CM

## 2021-04-06 DIAGNOSIS — M54.2 CERVICALGIA: ICD-10-CM

## 2021-04-06 DIAGNOSIS — M25.50 CHRONIC PAIN OF MULTIPLE JOINTS: ICD-10-CM

## 2021-04-06 DIAGNOSIS — G89.29 CHRONIC PAIN OF MULTIPLE JOINTS: ICD-10-CM

## 2021-04-06 DIAGNOSIS — M79.7 FIBROMYALGIA: Primary | ICD-10-CM

## 2021-04-06 PROCEDURE — 4004F PT TOBACCO SCREEN RCVD TLK: CPT | Performed by: NURSE PRACTITIONER

## 2021-04-06 PROCEDURE — G8419 CALC BMI OUT NRM PARAM NOF/U: HCPCS | Performed by: NURSE PRACTITIONER

## 2021-04-06 PROCEDURE — G8427 DOCREV CUR MEDS BY ELIG CLIN: HCPCS | Performed by: NURSE PRACTITIONER

## 2021-04-06 PROCEDURE — 99214 OFFICE O/P EST MOD 30 MIN: CPT | Performed by: NURSE PRACTITIONER

## 2021-04-06 PROCEDURE — 3017F COLORECTAL CA SCREEN DOC REV: CPT | Performed by: NURSE PRACTITIONER

## 2021-04-06 PROCEDURE — 99213 OFFICE O/P EST LOW 20 MIN: CPT | Performed by: NURSE PRACTITIONER

## 2021-04-06 RX ORDER — BUPRENORPHINE HYDROCHLORIDE 150 UG/1
1 FILM, SOLUBLE BUCCAL 2 TIMES DAILY
Qty: 60 EACH | Refills: 0 | Status: SHIPPED | OUTPATIENT
Start: 2021-04-06 | End: 2021-05-04

## 2021-04-06 RX ORDER — OXYCODONE AND ACETAMINOPHEN 7.5; 325 MG/1; MG/1
1 TABLET ORAL EVERY 6 HOURS PRN
Qty: 120 TABLET | Refills: 0 | Status: SHIPPED | OUTPATIENT
Start: 2021-04-17 | End: 2021-05-04 | Stop reason: SDUPTHER

## 2021-04-06 SDOH — HEALTH STABILITY: MENTAL HEALTH: HOW MANY STANDARD DRINKS CONTAINING ALCOHOL DO YOU HAVE ON A TYPICAL DAY?: NOT ASKED

## 2021-04-06 ASSESSMENT — ENCOUNTER SYMPTOMS
BACK PAIN: 1
RESPIRATORY NEGATIVE: 1

## 2021-04-06 NOTE — PROGRESS NOTES
Pills counted for patient while in the office. Percocet 5/325 pill bottle dated for 3.20.21 #120 filled    Pt had in original bottle, 51 tablets of percocet.   Counted out loud with patient agreeing to the amount, numbers on track with prescription

## 2021-04-06 NOTE — PROGRESS NOTES
Subjective:      Patient ID: Mona Schroeder is a 60 y.o. female. Chief Complaint   Patient presents with    Chronic Pain     2 month follow up    Medication Management     Neck Pain   Pertinent negatives include no numbness or weakness. Here today for routine pain clinic recheck, chronic myofascial pain, chronic joint pain. Percocet more effective than Norco, denies side effects. She is prescribed Belbuca 75, does not feel like it is working for extended pain relief. Pain Assessment  Location of Pain: (fibromyalgia)  Severity of Pain: 7  Quality of Pain: Throbbing, Sharp, Dull, Aching  Duration of Pain: Persistent  Frequency of Pain: Constant  Aggravating Factors: Bending, Straightening, Kneeling, Walking, Stairs, Standing, Squatting, Exercise, Stretching  Limiting Behavior: Some  Relieving Factors: Heat, Exercise(hot baths, salon pas)    No Known Allergies    Outpatient Medications Marked as Taking for the 4/6/21 encounter (Office Visit) with JACKLYN Welch CNP   Medication Sig Dispense Refill    [START ON 4/17/2021] oxyCODONE-acetaminophen (PERCOCET) 7.5-325 MG per tablet Take 1 tablet by mouth every 6 hours as needed for Pain for up to 30 days. Intended supply: 30 days 120 tablet 0    Buprenorphine HCl (BELBUCA) 150 MCG FILM Place 1 Film inside cheek 2 times daily for 30 days. 60 each 0    buprenorphine (BELBUCA) 75 MCG FILM buccal film Place 1 Film inside cheek every 12 hours for 30 days.  60 Film 0    ibuprofen (ADVIL;MOTRIN) 800 MG tablet Take 1 tablet by mouth every 6 hours as needed for Pain 360 tablet 3    tiZANidine (ZANAFLEX) 4 MG tablet Take 1 tablet by mouth 3 times daily 90 tablet 5    desvenlafaxine succinate (PRISTIQ) 100 MG TB24 extended release tablet Take 100 mg by mouth daily      busPIRone (BUSPAR) 10 MG tablet Take 10 mg by mouth         Past Medical History:   Diagnosis Date    Anxiety     Arthritis     CMC (carpometacarpal joint) dislocation, left, sequela  DJD (degenerative joint disease) of cervical spine     Fibromyalgia        Past Surgical History:   Procedure Laterality Date    BREAST SURGERY  2002     SECTION      times 2    ENDOMETRIAL ABLATION  2012    HAND SURGERY Left 2017    SHOULDER SURGERY Right 2014       Family History   Problem Relation Age of Onset    Cancer Brother         lung       Social History     Socioeconomic History    Marital status:      Spouse name: None    Number of children: None    Years of education: None    Highest education level: None   Occupational History    None   Social Needs    Financial resource strain: None    Food insecurity     Worry: None     Inability: None    Transportation needs     Medical: None     Non-medical: None   Tobacco Use    Smoking status: Current Every Day Smoker     Packs/day: 1.00     Types: Cigarettes    Smokeless tobacco: Never Used   Substance and Sexual Activity    Alcohol use: Yes     Alcohol/week: 4.0 standard drinks     Types: 4 Shots of liquor per week     Frequency: 2-3 times a week    Drug use: No    Sexual activity: Yes     Partners: Male   Lifestyle    Physical activity     Days per week: None     Minutes per session: None    Stress: None   Relationships    Social connections     Talks on phone: None     Gets together: None     Attends Restorationism service: None     Active member of club or organization: None     Attends meetings of clubs or organizations: None     Relationship status: None    Intimate partner violence     Fear of current or ex partner: None     Emotionally abused: None     Physically abused: None     Forced sexual activity: None   Other Topics Concern    None   Social History Narrative    None     Review of Systems   Constitutional: Positive for activity change (limited) and fatigue. Respiratory: Negative. Cardiovascular: Negative. Genitourinary: Negative. Musculoskeletal: Positive for back pain, myalgias and neck pain. Neurological: Negative for weakness and numbness. Psychiatric/Behavioral: Positive for sleep disturbance. History anxiety       Objective:   Physical Exam  Vitals signs reviewed. Constitutional:       General: She is not in acute distress. Appearance: She is well-developed. She is not ill-appearing, toxic-appearing or diaphoretic. Interventions: She is not intubated. HENT:      Head: Normocephalic and atraumatic. Cardiovascular:      Rate and Rhythm: Normal rate. Pulmonary:      Effort: Pulmonary effort is normal. No tachypnea, bradypnea, accessory muscle usage, prolonged expiration, respiratory distress or retractions. She is not intubated. Musculoskeletal:        Back:    Skin:     General: Skin is warm and dry. Capillary Refill: Capillary refill takes less than 2 seconds. Coloration: Skin is not pale. Nails: There is no clubbing. Neurological:      Mental Status: She is alert and oriented to person, place, and time. She is not disoriented. GCS: GCS eye subscore is 4. GCS verbal subscore is 5. GCS motor subscore is 6. Cranial Nerves: No cranial nerve deficit. Psychiatric:         Mood and Affect: Mood and affect normal.         Speech: She is communicative. Behavior: Behavior normal. Behavior is not agitated, aggressive or withdrawn. Behavior is cooperative. Cognition and Memory: Cognition and memory normal.         Judgment: Judgment normal. Judgment is not impulsive or inappropriate. Assessment:      1. Fibromyalgia    2. Myofascial pain    3. Chronic pain of multiple joints    4. Cervicalgia          Plan:       Chronic pain diagnoses such as   1. Fibromyalgia    2. Myofascial pain    3. Chronic pain of multiple joints    4. Cervicalgia     controlled on current medication regime, wll continue current pain medications to improve quality of life and function.      Increase Belbuca 150mcq    Controlled Substances Monitoring:     [facility] Monitoring 4/6/2021   Attestation -   Periodic Controlled Substance Monitoring No signs of potential drug abuse or diversion identified.    Chronic Pain > 50 MEDD -   Chronic Pain > 80 MEDD -   one month follow up      JACKLYN Carreon - CNP

## 2021-05-04 ENCOUNTER — OFFICE VISIT (OUTPATIENT)
Dept: PAIN MANAGEMENT | Age: 56
End: 2021-05-04
Payer: MEDICARE

## 2021-05-04 VITALS
HEIGHT: 63 IN | WEIGHT: 153.2 LBS | BODY MASS INDEX: 27.14 KG/M2 | DIASTOLIC BLOOD PRESSURE: 86 MMHG | SYSTOLIC BLOOD PRESSURE: 140 MMHG | HEART RATE: 78 BPM

## 2021-05-04 DIAGNOSIS — G89.29 CHRONIC PAIN OF MULTIPLE JOINTS: ICD-10-CM

## 2021-05-04 DIAGNOSIS — M79.7 FIBROMYALGIA: ICD-10-CM

## 2021-05-04 DIAGNOSIS — M25.50 CHRONIC PAIN OF MULTIPLE JOINTS: ICD-10-CM

## 2021-05-04 DIAGNOSIS — M79.18 MYOFASCIAL PAIN: Primary | ICD-10-CM

## 2021-05-04 PROCEDURE — 99212 OFFICE O/P EST SF 10 MIN: CPT | Performed by: NURSE PRACTITIONER

## 2021-05-04 PROCEDURE — 20553 NJX 1/MLT TRIGGER POINTS 3/>: CPT | Performed by: NURSE PRACTITIONER

## 2021-05-04 PROCEDURE — G8419 CALC BMI OUT NRM PARAM NOF/U: HCPCS | Performed by: NURSE PRACTITIONER

## 2021-05-04 PROCEDURE — G8427 DOCREV CUR MEDS BY ELIG CLIN: HCPCS | Performed by: NURSE PRACTITIONER

## 2021-05-04 PROCEDURE — 99214 OFFICE O/P EST MOD 30 MIN: CPT | Performed by: NURSE PRACTITIONER

## 2021-05-04 PROCEDURE — 3017F COLORECTAL CA SCREEN DOC REV: CPT | Performed by: NURSE PRACTITIONER

## 2021-05-04 PROCEDURE — 4004F PT TOBACCO SCREEN RCVD TLK: CPT | Performed by: NURSE PRACTITIONER

## 2021-05-04 RX ORDER — BUPRENORPHINE HYDROCHLORIDE 450 UG/1
1 FILM, SOLUBLE BUCCAL 2 TIMES DAILY
Qty: 60 EACH | Refills: 0 | Status: SHIPPED | OUTPATIENT
Start: 2021-05-04 | End: 2021-06-03

## 2021-05-04 RX ORDER — OXYCODONE AND ACETAMINOPHEN 7.5; 325 MG/1; MG/1
1 TABLET ORAL EVERY 6 HOURS PRN
Qty: 120 TABLET | Refills: 0 | Status: SHIPPED | OUTPATIENT
Start: 2021-05-17 | End: 2021-06-16

## 2021-05-04 RX ADMIN — TRIAMCINOLONE ACETONIDE 10 MG: 10 INJECTION, SUSPENSION INTRA-ARTICULAR; INTRALESIONAL at 14:44

## 2021-05-04 ASSESSMENT — ENCOUNTER SYMPTOMS
RESPIRATORY NEGATIVE: 1
BACK PAIN: 1

## 2021-05-04 NOTE — PROGRESS NOTES
Subjective:      Patient ID: Jairo Barrera is a 60 y.o. female. Chief Complaint   Patient presents with    Chronic Pain     pt states increased mid to lower back pain, but rest of body is the same level pain wise      Neck Pain   Pertinent negatives include no numbness or weakness. Here today for routine pain clinic recheck, chronic myofascial pain, chronic joint pain. Percocet more effective than Norco, only lasting 4 hours, denies side effects. She is prescribed Belbuca 150, does not feel like it is working well, requesting TPI's today    Pain Assessment  Location of Pain: Back  Location Modifiers: Medial, Inferior  Severity of Pain: 8  Quality of Pain: Throbbing, Sharp  Duration of Pain: Persistent  Frequency of Pain: Constant  Aggravating Factors: Bending, Stretching, Straightening, Stairs, Kneeling, Squatting  Limiting Behavior: Yes  Relieving Factors: Heat, Rest, Other (Comment)(TENS unit)  Result of Injury: No  Work-Related Injury: No  Are there other pain locations you wish to document?: No    No Known Allergies    Outpatient Medications Marked as Taking for the 5/4/21 encounter (Office Visit) with JACKLYN Olivera CNP   Medication Sig Dispense Refill    Buprenorphine HCl (BELBUCA) 450 MCG FILM Place 1 Film inside cheek 2 times daily for 30 days. 60 each 0    [START ON 5/17/2021] oxyCODONE-acetaminophen (PERCOCET) 7.5-325 MG per tablet Take 1 tablet by mouth every 6 hours as needed for Pain for up to 30 days.  Intended supply: 30 days 120 tablet 0    ibuprofen (ADVIL;MOTRIN) 800 MG tablet Take 1 tablet by mouth every 6 hours as needed for Pain 360 tablet 3    tiZANidine (ZANAFLEX) 4 MG tablet Take 1 tablet by mouth 3 times daily 90 tablet 5    desvenlafaxine succinate (PRISTIQ) 100 MG TB24 extended release tablet Take 100 mg by mouth daily      busPIRone (BUSPAR) 10 MG tablet Take 10 mg by mouth         Past Medical History:   Diagnosis Date    Anxiety     Arthritis     CMC (carpometacarpal joint) dislocation, left, sequela     DJD (degenerative joint disease) of cervical spine     Fibromyalgia        Past Surgical History:   Procedure Laterality Date    BREAST SURGERY  2002     SECTION      times 2    ENDOMETRIAL ABLATION  2012    HAND SURGERY Left 2017    SHOULDER SURGERY Right 2014       Family History   Problem Relation Age of Onset    Cancer Brother         lung       Social History     Socioeconomic History    Marital status:      Spouse name: None    Number of children: None    Years of education: None    Highest education level: None   Occupational History    None   Social Needs    Financial resource strain: None    Food insecurity     Worry: None     Inability: None    Transportation needs     Medical: None     Non-medical: None   Tobacco Use    Smoking status: Current Every Day Smoker     Packs/day: 1.00     Types: Cigarettes    Smokeless tobacco: Never Used   Substance and Sexual Activity    Alcohol use: Yes     Alcohol/week: 4.0 standard drinks     Types: 4 Shots of liquor per week     Frequency: 2-3 times a week    Drug use: No    Sexual activity: Yes     Partners: Male   Lifestyle    Physical activity     Days per week: None     Minutes per session: None    Stress: None   Relationships    Social connections     Talks on phone: None     Gets together: None     Attends Holiness service: None     Active member of club or organization: None     Attends meetings of clubs or organizations: None     Relationship status: None    Intimate partner violence     Fear of current or ex partner: None     Emotionally abused: None     Physically abused: None     Forced sexual activity: None   Other Topics Concern    None   Social History Narrative    None     Review of Systems   Constitutional: Positive for activity change (limited) and fatigue. Respiratory: Negative. Cardiovascular: Negative. Genitourinary: Negative.     Musculoskeletal: Positive for back pain, myalgias and neck pain. Neurological: Negative for weakness and numbness. Psychiatric/Behavioral: Positive for sleep disturbance. History anxiety       Objective:   Physical Exam  Vitals signs reviewed. Constitutional:       General: She is not in acute distress. Appearance: She is well-developed. She is not ill-appearing, toxic-appearing or diaphoretic. Interventions: She is not intubated. HENT:      Head: Normocephalic and atraumatic. Cardiovascular:      Rate and Rhythm: Normal rate. Pulmonary:      Effort: Pulmonary effort is normal. No tachypnea, bradypnea, accessory muscle usage, prolonged expiration, respiratory distress or retractions. She is not intubated. Musculoskeletal:        Back:    Skin:     General: Skin is warm and dry. Capillary Refill: Capillary refill takes less than 2 seconds. Coloration: Skin is not pale. Nails: There is no clubbing. Neurological:      Mental Status: She is alert and oriented to person, place, and time. She is not disoriented. GCS: GCS eye subscore is 4. GCS verbal subscore is 5. GCS motor subscore is 6. Cranial Nerves: No cranial nerve deficit. Psychiatric:         Mood and Affect: Mood and affect normal.         Speech: She is communicative. Behavior: Behavior normal. Behavior is not agitated, aggressive or withdrawn. Behavior is cooperative. Cognition and Memory: Cognition and memory normal.         Judgment: Judgment normal. Judgment is not impulsive or inappropriate. Assessment:      1. Myofascial pain    2. Fibromyalgia    3. Chronic pain of multiple joints          Plan:       Continue percocet as prescribed  Increase Belbuca 450mcq  TPI's given, please see separate note    Controlled Substances Monitoring:     RX Monitoring 4/6/2021   Attestation -   Periodic Controlled Substance Monitoring No signs of potential drug abuse or diversion identified.    Chronic Pain > 50 MEDD -   Chronic Pain > 80 MEDD -   one month follow up      Summer Velasquez, JACKLYN - CNP

## 2021-05-04 NOTE — PROGRESS NOTES
Kojo Maciel  1965 6/4/18      PAIN MANAGEMENT CLINIC PROCEDURE NOTE    CHIEF COMPLAINT: This is a 60 y.o. female patient who presents to the Pain Management Clinic with a history of pain in the upper thoracic. PRE-PROCEDURE DIAGNOSIS: Bilateral myofascial pain    POST-PROCEDURE DIAGNOSIS: same as pre-procedure diagnosis    Vitals:    05/04/21 1429   BP: (!) 146/90   Pulse: 78            Trigger points were found in the bilateral:   UPPER TRAPEZIUS MUSCLE  LEVATOR SCAPULA MUSCLE  MID/LOWER CERVICAL PARASPINAL MUSCLES    PROCEDURE:     The procedure was explained, including risks and benefits, and the patient has agreed to proceed. The injection site was marked on the patients skin. A large area around the injection site was cleaned with chlora-prep. Peri Oconnor TRIGGER POINT INJECTIONS  A 25G 1.5 inch needle was inserted into each muscle. Depth and direction of the needle were monitored at all times. The needle was advanced until a muscle twitch response was felt and the patient reported concordant pain. The syringe was aspirated and was negative for heme or air. Then, a combination of 1ml of 2%lidocaine, and 10mg of kenalog (NDC# 9350-5156-61) was injected. The needle was then moved in and out of the muscle at the same depth to break up additional muscle spasms. A total of 6 trigger points were injected. The injection site was cleaned and dressed with a spot band aid. The patient tolerated the procedure well and with out complication The patient was instructed avoid heat and excessive activity for 24-48 hours.

## 2021-05-28 ENCOUNTER — TELEPHONE (OUTPATIENT)
Dept: PAIN MANAGEMENT | Age: 56
End: 2021-05-28

## 2021-05-28 NOTE — TELEPHONE ENCOUNTER
Writer called patient will foster's approval of taking lower dose of belbuca. Writer also educated patient in making her aware that if symptoms become unmanageable that she should go to urgent care or the ER. Patient acknowledged understanding.

## 2021-05-28 NOTE — TELEPHONE ENCOUNTER
Last appt. bulbuca was increased causing swelling of ankles and feet and some of the hands too. Patient when back to the prior dose and is swelling has decreased. Patient is now stable. Want to make sure it was okay to take lower dose. Ph#: [phone].

## 2021-06-14 ENCOUNTER — HOSPITAL ENCOUNTER (OUTPATIENT)
Age: 56
Setting detail: SPECIMEN
Discharge: HOME | End: 2021-06-14
Payer: MEDICARE

## 2021-06-14 ENCOUNTER — OFFICE VISIT (OUTPATIENT)
Dept: PAIN MANAGEMENT | Age: 56
End: 2021-06-14
Payer: MEDICARE

## 2021-06-14 VITALS
OXYGEN SATURATION: 95 % | WEIGHT: 150 LBS | HEART RATE: 87 BPM | HEIGHT: 63 IN | SYSTOLIC BLOOD PRESSURE: 130 MMHG | DIASTOLIC BLOOD PRESSURE: 86 MMHG | BODY MASS INDEX: 26.58 KG/M2

## 2021-06-14 DIAGNOSIS — Z02.83 ENCOUNTER FOR DRUG SCREENING: ICD-10-CM

## 2021-06-14 DIAGNOSIS — G89.29 CHRONIC PAIN OF MULTIPLE JOINTS: Primary | ICD-10-CM

## 2021-06-14 DIAGNOSIS — M25.50 CHRONIC PAIN OF MULTIPLE JOINTS: Primary | ICD-10-CM

## 2021-06-14 DIAGNOSIS — M79.18 MYOFASCIAL PAIN: ICD-10-CM

## 2021-06-14 DIAGNOSIS — M79.7 FIBROMYALGIA: ICD-10-CM

## 2021-06-14 DIAGNOSIS — Z79.891 ENCOUNTER FOR LONG-TERM OPIATE ANALGESIC USE: ICD-10-CM

## 2021-06-14 PROCEDURE — 80307 DRUG TEST PRSMV CHEM ANLYZR: CPT

## 2021-06-14 PROCEDURE — 4004F PT TOBACCO SCREEN RCVD TLK: CPT | Performed by: NURSE PRACTITIONER

## 2021-06-14 PROCEDURE — 99213 OFFICE O/P EST LOW 20 MIN: CPT | Performed by: NURSE PRACTITIONER

## 2021-06-14 PROCEDURE — 99214 OFFICE O/P EST MOD 30 MIN: CPT | Performed by: NURSE PRACTITIONER

## 2021-06-14 PROCEDURE — 3017F COLORECTAL CA SCREEN DOC REV: CPT | Performed by: NURSE PRACTITIONER

## 2021-06-14 PROCEDURE — G8427 DOCREV CUR MEDS BY ELIG CLIN: HCPCS | Performed by: NURSE PRACTITIONER

## 2021-06-14 PROCEDURE — G8419 CALC BMI OUT NRM PARAM NOF/U: HCPCS | Performed by: NURSE PRACTITIONER

## 2021-06-14 RX ORDER — HYDROCODONE BITARTRATE AND ACETAMINOPHEN 10; 325 MG/1; MG/1
1 TABLET ORAL EVERY 6 HOURS PRN
Qty: 120 TABLET | Refills: 0 | Status: SHIPPED | OUTPATIENT
Start: 2021-06-14 | End: 2021-07-09 | Stop reason: SDUPTHER

## 2021-06-14 RX ORDER — KETOROLAC TROMETHAMINE 10 MG/1
10 TABLET, FILM COATED ORAL EVERY 6 HOURS PRN
Qty: 20 TABLET | Refills: 1 | Status: SHIPPED | OUTPATIENT
Start: 2021-06-14 | End: 2021-07-15 | Stop reason: SDUPTHER

## 2021-06-14 ASSESSMENT — ENCOUNTER SYMPTOMS
RESPIRATORY NEGATIVE: 1
BACK PAIN: 1

## 2021-06-17 LAB
6-ACETYLMORPHINE, UR: NOT DETECTED
7-AMINOCLONAZEPAM, URINE: NOT DETECTED
ALPHA-OH-ALPRAZ, URINE: NOT DETECTED
ALPHA-OH-MIDAZOLAM, URINE: NOT DETECTED
ALPRAZOLAM, URINE: NOT DETECTED
AMPHETAMINES, URINE: NOT DETECTED
BARBITURATES, URINE: NOT DETECTED
BENZOYLECGONINE, UR: NOT DETECTED
BUPRENORPHINE URINE: NOT DETECTED
CARISOPRODOL, UR: NOT DETECTED
CLONAZEPAM, URINE: NOT DETECTED
CODEINE, URINE: NOT DETECTED
CREATININE URINE: 82.8 MG/DL (ref 20–400)
DIAZEPAM, URINE: NOT DETECTED
EER PAIN MGT DRUG PANEL, HIGH RES/EMIT U: NORMAL
ETHYL GLUCURONIDE UR: PRESENT
FENTANYL URINE: NOT DETECTED
GABAPENTIN: NOT DETECTED
HYDROCODONE, URINE: NOT DETECTED
HYDROMORPHONE, URINE: NOT DETECTED
LORAZEPAM, URINE: NOT DETECTED
MARIJUANA METAB, UR: NOT DETECTED
MDA, UR: NOT DETECTED
MDEA, EVE, UR: NOT DETECTED
MDMA URINE: NOT DETECTED
MEPERIDINE METAB, UR: NOT DETECTED
METHADONE, URINE: NOT DETECTED
METHAMPHETAMINE, URINE: NOT DETECTED
METHYLPHENIDATE: NOT DETECTED
MIDAZOLAM, URINE: NOT DETECTED
MORPHINE URINE: NOT DETECTED
NALOXONE URINE: NOT DETECTED
NORBUPRENORPHINE, URINE: NOT DETECTED
NORDIAZEPAM, URINE: NOT DETECTED
NORFENTANYL, URINE: NOT DETECTED
NORHYDROCODONE, URINE: NOT DETECTED
NOROXYCODONE, URINE: PRESENT
NOROXYMORPHONE, URINE: PRESENT
OXAZEPAM, URINE: NOT DETECTED
OXYCODONE URINE: PRESENT
OXYMORPHONE, URINE: PRESENT
PAIN MGT DRUG PANEL, HI RES, UR: NORMAL
PCP,URINE: NOT DETECTED
PHENTERMINE, UR: NOT DETECTED
PREGABALIN: NOT DETECTED
TAPENTADOL, URINE: NOT DETECTED
TAPENTADOL-O-SULFATE, URINE: NOT DETECTED
TEMAZEPAM, URINE: NOT DETECTED
TRAMADOL, URINE: NOT DETECTED
ZOLPIDEM METABOLITE (ZCA), URINE: NOT DETECTED
ZOLPIDEM, URINE: NOT DETECTED

## 2021-06-29 ENCOUNTER — TELEPHONE (OUTPATIENT)
Dept: PAIN MANAGEMENT | Age: 56
End: 2021-06-29

## 2021-06-29 NOTE — TELEPHONE ENCOUNTER
Pt called wondering if she is supposed to fill her Toradol rx since she has 1 refill. Pt stated she has already used the rx from 6/14/21.      Last Appt:  6/14/2021  Next Appt:   7/15/2021  Med verified in [facility] [address]

## 2021-07-09 DIAGNOSIS — M25.50 CHRONIC PAIN OF MULTIPLE JOINTS: ICD-10-CM

## 2021-07-09 DIAGNOSIS — M79.18 MYOFASCIAL PAIN: ICD-10-CM

## 2021-07-09 DIAGNOSIS — G89.29 CHRONIC PAIN OF MULTIPLE JOINTS: ICD-10-CM

## 2021-07-09 RX ORDER — HYDROCODONE BITARTRATE AND ACETAMINOPHEN 10; 325 MG/1; MG/1
1 TABLET ORAL EVERY 6 HOURS PRN
Qty: 120 TABLET | Refills: 0 | Status: SHIPPED | OUTPATIENT
Start: 2021-07-14 | End: 2021-08-10 | Stop reason: SDUPTHER

## 2021-07-09 NOTE — TELEPHONE ENCOUNTER
Pt called refill line for her Norco and toradol sent to [facility]. Last Appt:  6/14/2021  Next Appt:   7/15/2021  Med verified in [address] [place] [address] checked for [place], [place], and [place]: 6/14/21 hydrocodone acetaminophen 10-325mg #120. Due 7/14/21.

## 2021-07-15 ENCOUNTER — OFFICE VISIT (OUTPATIENT)
Dept: PAIN MANAGEMENT | Age: 56
End: 2021-07-15
Payer: MEDICARE

## 2021-07-15 VITALS
HEIGHT: 63 IN | BODY MASS INDEX: 27.11 KG/M2 | SYSTOLIC BLOOD PRESSURE: 128 MMHG | DIASTOLIC BLOOD PRESSURE: 78 MMHG | WEIGHT: 153 LBS

## 2021-07-15 DIAGNOSIS — G89.29 CHRONIC PAIN OF MULTIPLE JOINTS: ICD-10-CM

## 2021-07-15 DIAGNOSIS — M79.7 FIBROMYALGIA: Primary | ICD-10-CM

## 2021-07-15 DIAGNOSIS — M54.2 CERVICALGIA: ICD-10-CM

## 2021-07-15 DIAGNOSIS — M79.18 MYOFASCIAL PAIN: ICD-10-CM

## 2021-07-15 DIAGNOSIS — M25.50 CHRONIC PAIN OF MULTIPLE JOINTS: ICD-10-CM

## 2021-07-15 PROCEDURE — G8427 DOCREV CUR MEDS BY ELIG CLIN: HCPCS | Performed by: NURSE PRACTITIONER

## 2021-07-15 PROCEDURE — 99213 OFFICE O/P EST LOW 20 MIN: CPT | Performed by: NURSE PRACTITIONER

## 2021-07-15 PROCEDURE — 4004F PT TOBACCO SCREEN RCVD TLK: CPT | Performed by: NURSE PRACTITIONER

## 2021-07-15 PROCEDURE — G8419 CALC BMI OUT NRM PARAM NOF/U: HCPCS | Performed by: NURSE PRACTITIONER

## 2021-07-15 PROCEDURE — 3017F COLORECTAL CA SCREEN DOC REV: CPT | Performed by: NURSE PRACTITIONER

## 2021-07-15 PROCEDURE — 99214 OFFICE O/P EST MOD 30 MIN: CPT | Performed by: NURSE PRACTITIONER

## 2021-07-15 RX ORDER — DICLOFENAC SODIUM 75 MG/1
75 TABLET, DELAYED RELEASE ORAL 2 TIMES DAILY
Qty: 60 TABLET | Refills: 3 | Status: SHIPPED | OUTPATIENT
Start: 2021-07-15 | End: 2021-11-10 | Stop reason: SDUPTHER

## 2021-07-15 RX ORDER — KETOROLAC TROMETHAMINE 10 MG/1
10 TABLET, FILM COATED ORAL EVERY 6 HOURS PRN
Qty: 20 TABLET | Refills: 1 | Status: SHIPPED | OUTPATIENT
Start: 2021-07-15 | End: 2021-09-10 | Stop reason: SDUPTHER

## 2021-07-15 ASSESSMENT — ENCOUNTER SYMPTOMS
RESPIRATORY NEGATIVE: 1
BACK PAIN: 1

## 2021-07-15 NOTE — PROGRESS NOTES
Subjective:      Patient ID: Shannon Morton is a 60 y.o. female. Chief Complaint   Patient presents with    1 Month Follow-Up     joint pain, fibrobyalgia      Neck Pain   Pertinent negatives include no numbness or weakness. Here today for routine pain clinic recheck, chronic myofascial pain, chronic joint pain. Pain Assessment  Location of Pain: Neck (shoulders, lower back, hands, knees)  Location Modifiers: Left, Right, Anterior, Posterior, Superior, Inferior, Medial, Lateral  Severity of Pain: 7  Quality of Pain: Sharp, Aching  Duration of Pain: Persistent  Frequency of Pain: Constant  Aggravating Factors: Bending, Stretching, Straightening, Exercise, Kneeling, Squatting, Standing, Walking, Stairs  Limiting Behavior: Yes  Relieving Factors: Heat, Other (Comment) (tens unit, creams. )    No Known Allergies    Outpatient Medications Marked as Taking for the 7/15/21 encounter (Office Visit) with JACKLYN Hatfield CNP   Medication Sig Dispense Refill    ketorolac (TORADOL) 10 MG tablet Take 1 tablet by mouth every 6 hours as needed for Pain 20 tablet 1    diclofenac (VOLTAREN) 75 MG EC tablet Take 1 tablet by mouth 2 times daily 60 tablet 3    HYDROcodone-acetaminophen (NORCO)  MG per tablet Take 1 tablet by mouth every 6 hours as needed for Pain for up to 30 days.  Intended supply: 30 days 120 tablet 0    tiZANidine (ZANAFLEX) 4 MG tablet Take 1 tablet by mouth 3 times daily 90 tablet 5    desvenlafaxine succinate (PRISTIQ) 100 MG TB24 extended release tablet Take 100 mg by mouth daily      busPIRone (BUSPAR) 10 MG tablet Take 10 mg by mouth 2 times daily          Past Medical History:   Diagnosis Date    Anxiety     Arthritis     CMC (carpometacarpal joint) dislocation, left, sequela     DJD (degenerative joint disease) of cervical spine     Fibromyalgia        Past Surgical History:   Procedure Laterality Date    BREAST SURGERY  2002     SECTION      times 2    ENDOMETRIAL ABLATION  2012    HAND SURGERY Left 2017    SHOULDER SURGERY Right 2014       Family History   Problem Relation Age of Onset    Cancer Brother         lung       Social History     Socioeconomic History    Marital status:      Spouse name: None    Number of children: None    Years of education: None    Highest education level: None   Occupational History    None   Tobacco Use    Smoking status: Current Every Day Smoker     Packs/day: 1.00     Types: Cigarettes    Smokeless tobacco: Never Used   Vaping Use    Vaping Use: Former   Substance and Sexual Activity    Alcohol use: Yes     Alcohol/week: 4.0 standard drinks     Types: 4 Shots of liquor per week    Drug use: No    Sexual activity: Yes     Partners: Male   Other Topics Concern    None   Social History Narrative    None     Social Determinants of Health     Financial Resource Strain:     Difficulty of Paying Living Expenses:    Food Insecurity:     Worried About Running Out of Food in the Last Year:     Ran Out of Food in the Last Year:    Transportation Needs:     Lack of Transportation (Medical):  Lack of Transportation (Non-Medical):    Physical Activity:     Days of Exercise per Week:     Minutes of Exercise per Session:    Stress:     Feeling of Stress :    Social Connections:     Frequency of Communication with Friends and Family:     Frequency of Social Gatherings with Friends and Family:     Attends Congregation Services:     Active Member of Clubs or Organizations:     Attends Club or Organization Meetings:     Marital Status:    Intimate Partner Violence:     Fear of Current or Ex-Partner:     Emotionally Abused:     Physically Abused:     Sexually Abused:      Review of Systems   Constitutional: Positive for activity change (limited) and fatigue. Respiratory: Negative. Cardiovascular: Negative. Genitourinary: Negative. Musculoskeletal: Positive for back pain, myalgias and neck pain. Neurological: Negative for weakness and numbness. Psychiatric/Behavioral: Positive for sleep disturbance. History anxiety       Objective:   Physical Exam  Vitals reviewed. Constitutional:       General: She is not in acute distress. Appearance: She is well-developed. She is not ill-appearing, toxic-appearing or diaphoretic. Interventions: She is not intubated. HENT:      Head: Normocephalic and atraumatic. Cardiovascular:      Rate and Rhythm: Normal rate. Pulmonary:      Effort: Pulmonary effort is normal. No tachypnea, bradypnea, accessory muscle usage, prolonged expiration, respiratory distress or retractions. She is not intubated. Musculoskeletal:        Back:    Skin:     General: Skin is warm and dry. Capillary Refill: Capillary refill takes less than 2 seconds. Coloration: Skin is not pale. Nails: There is no clubbing. Neurological:      Mental Status: She is alert and oriented to person, place, and time. She is not disoriented. GCS: GCS eye subscore is 4. GCS verbal subscore is 5. GCS motor subscore is 6. Cranial Nerves: No cranial nerve deficit. Psychiatric:         Mood and Affect: Mood and affect normal.         Speech: She is communicative. Behavior: Behavior normal. Behavior is not agitated, aggressive or withdrawn. Behavior is cooperative. Cognition and Memory: Cognition and memory normal.         Judgment: Judgment normal. Judgment is not impulsive or inappropriate. Assessment:      1. Fibromyalgia    2. Myofascial pain    3. Chronic pain of multiple joints    4. Cervicalgia          Plan:     Discontinue ibuprofen. To start diclofenac 75mg bid. toradol prn bad days, instructed not to take with diclofenac. Continue norco as prescribed.      Controlled Substances Monitoring:     RX Monitoring 7/15/2021   Attestation -   Periodic Controlled Substance Monitoring No signs of potential drug abuse or diversion identified.;Obtaining appropriate analgesic effect of treatment.    Chronic Pain > 50 MEDD -   Chronic Pain > 80 MEDD -   one month follow up      JACKLYN Pollock - CNP

## 2021-08-10 DIAGNOSIS — G89.29 CHRONIC PAIN OF MULTIPLE JOINTS: ICD-10-CM

## 2021-08-10 DIAGNOSIS — M25.50 CHRONIC PAIN OF MULTIPLE JOINTS: ICD-10-CM

## 2021-08-10 DIAGNOSIS — M79.18 MYOFASCIAL PAIN: ICD-10-CM

## 2021-08-10 RX ORDER — HYDROCODONE BITARTRATE AND ACETAMINOPHEN 10; 325 MG/1; MG/1
1 TABLET ORAL EVERY 6 HOURS PRN
Qty: 120 TABLET | Refills: 0 | Status: SHIPPED | OUTPATIENT
Start: 2021-08-13 | End: 2021-09-07 | Stop reason: SDUPTHER

## 2021-08-10 NOTE — TELEPHONE ENCOUNTER
OARRS Report checked for [address] [place], and [place]: 7/14/21 norco 10-325mg #120. Due 8/13/21.     Last Appt:  7/15/2021  Next Appt:   8/17/2021  Med verified in [address]

## 2021-08-17 ENCOUNTER — OFFICE VISIT (OUTPATIENT)
Dept: PAIN MANAGEMENT | Age: 56
End: 2021-08-17
Payer: MEDICARE

## 2021-08-17 VITALS — DIASTOLIC BLOOD PRESSURE: 80 MMHG | WEIGHT: 154 LBS | SYSTOLIC BLOOD PRESSURE: 122 MMHG | BODY MASS INDEX: 27.28 KG/M2

## 2021-08-17 DIAGNOSIS — M25.50 CHRONIC PAIN OF MULTIPLE JOINTS: ICD-10-CM

## 2021-08-17 DIAGNOSIS — M79.7 FIBROMYALGIA: ICD-10-CM

## 2021-08-17 DIAGNOSIS — G89.29 CHRONIC PAIN OF MULTIPLE JOINTS: ICD-10-CM

## 2021-08-17 DIAGNOSIS — G89.29 ENCOUNTER FOR CHRONIC PAIN MANAGEMENT: ICD-10-CM

## 2021-08-17 DIAGNOSIS — M54.2 CERVICALGIA: ICD-10-CM

## 2021-08-17 PROCEDURE — 99213 OFFICE O/P EST LOW 20 MIN: CPT | Performed by: NURSE PRACTITIONER

## 2021-08-17 PROCEDURE — G8427 DOCREV CUR MEDS BY ELIG CLIN: HCPCS | Performed by: NURSE PRACTITIONER

## 2021-08-17 PROCEDURE — 4004F PT TOBACCO SCREEN RCVD TLK: CPT | Performed by: NURSE PRACTITIONER

## 2021-08-17 PROCEDURE — G8419 CALC BMI OUT NRM PARAM NOF/U: HCPCS | Performed by: NURSE PRACTITIONER

## 2021-08-17 PROCEDURE — 3017F COLORECTAL CA SCREEN DOC REV: CPT | Performed by: NURSE PRACTITIONER

## 2021-08-17 PROCEDURE — 99214 OFFICE O/P EST MOD 30 MIN: CPT | Performed by: NURSE PRACTITIONER

## 2021-08-17 ASSESSMENT — ENCOUNTER SYMPTOMS
BACK PAIN: 1
RESPIRATORY NEGATIVE: 1

## 2021-08-17 NOTE — PROGRESS NOTES
Subjective:      Patient ID: Shelby Gaviria is a 60 y.o. female. Chief Complaint   Patient presents with    1 Month Follow-Up     fibromyalgia, chronic joint pain. Neck Pain   Pertinent negatives include no numbness or weakness. Here today for routine pain clinic recheck, chronic myofascial pain, chronic joint pain. Pain Assessment  Location of Pain: Back (joint pain. )  Location Modifiers: Left, Right, Posterior, Superior, Inferior, Medial, Lateral  Severity of Pain: 8  Quality of Pain: Throbbing, Sharp, Dull, Aching, Locking, Grinding, Popping, Cracking, Buckling  Duration of Pain: Persistent  Frequency of Pain: Constant  Aggravating Factors: Bending, Stretching, Straightening, Exercise, Kneeling, Squatting, Standing, Walking, Stairs  Limiting Behavior: Yes  Relieving Factors: Heat, Other (Comment) (tens unit, medications. )    No Known Allergies    Outpatient Medications Marked as Taking for the 8/17/21 encounter (Office Visit) with JACKLYN Machuca CNP   Medication Sig Dispense Refill    HYDROcodone-acetaminophen (NORCO)  MG per tablet Take 1 tablet by mouth every 6 hours as needed for Pain for up to 30 days.  Intended supply: 30 days 120 tablet 0    ketorolac (TORADOL) 10 MG tablet Take 1 tablet by mouth every 6 hours as needed for Pain 20 tablet 1    diclofenac (VOLTAREN) 75 MG EC tablet Take 1 tablet by mouth 2 times daily 60 tablet 3    tiZANidine (ZANAFLEX) 4 MG tablet Take 1 tablet by mouth 3 times daily 90 tablet 5    desvenlafaxine succinate (PRISTIQ) 100 MG TB24 extended release tablet Take 100 mg by mouth daily      busPIRone (BUSPAR) 10 MG tablet Take 10 mg by mouth 2 times daily          Past Medical History:   Diagnosis Date    Anxiety     Arthritis     CMC (carpometacarpal joint) dislocation, left, sequela     DJD (degenerative joint disease) of cervical spine     Fibromyalgia        Past Surgical History:   Procedure Laterality Date    BREAST SURGERY  2002   SECTION      times 2    ENDOMETRIAL ABLATION  2012    HAND SURGERY Left 2017    SHOULDER SURGERY Right 2014       Family History   Problem Relation Age of Onset    Cancer Brother         lung       Social History     Socioeconomic History    Marital status:      Spouse name: None    Number of children: None    Years of education: None    Highest education level: None   Occupational History    None   Tobacco Use    Smoking status: Current Every Day Smoker     Packs/day: 1.00     Types: Cigarettes    Smokeless tobacco: Never Used   Vaping Use    Vaping Use: Former   Substance and Sexual Activity    Alcohol use: Yes     Alcohol/week: 4.0 standard drinks     Types: 4 Shots of liquor per week    Drug use: No    Sexual activity: Yes     Partners: Male   Other Topics Concern    None   Social History Narrative    None     Social Determinants of Health     Financial Resource Strain:     Difficulty of Paying Living Expenses:    Food Insecurity:     Worried About Running Out of Food in the Last Year:     Ran Out of Food in the Last Year:    Transportation Needs:     Lack of Transportation (Medical):  Lack of Transportation (Non-Medical):    Physical Activity:     Days of Exercise per Week:     Minutes of Exercise per Session:    Stress:     Feeling of Stress :    Social Connections:     Frequency of Communication with Friends and Family:     Frequency of Social Gatherings with Friends and Family:     Attends Advent Services:     Active Member of Clubs or Organizations:     Attends Club or Organization Meetings:     Marital Status:    Intimate Partner Violence:     Fear of Current or Ex-Partner:     Emotionally Abused:     Physically Abused:     Sexually Abused:      Review of Systems   Constitutional: Positive for activity change (limited) and fatigue. Respiratory: Negative. Cardiovascular: Negative. Genitourinary: Negative.     Musculoskeletal: Positive for Alcides Sandoval is here for recheck/reevaluation of chronic pain. She is able to maintain daily activities with the use of current pain medications and is generally satisfied with level of analgesia. She shows no evidence of misuse or misappropriation of medications. She denies use of alcohol or illegal substances. UDS have been Appropriate with most recent screen     Controlled Substances Monitoring:     RX Monitoring 8/17/2021   Attestation -   Periodic Controlled Substance Monitoring No signs of potential drug abuse or diversion identified.;Obtaining appropriate analgesic effect of treatment.    Chronic Pain > 50 MEDD -   Chronic Pain > 80 MEDD -   Follow up 2 months      Anya Ely, APRN - CNP

## 2021-09-07 DIAGNOSIS — G89.29 CHRONIC PAIN OF MULTIPLE JOINTS: ICD-10-CM

## 2021-09-07 DIAGNOSIS — M79.18 MYOFASCIAL PAIN: ICD-10-CM

## 2021-09-07 DIAGNOSIS — M25.50 CHRONIC PAIN OF MULTIPLE JOINTS: ICD-10-CM

## 2021-09-07 RX ORDER — KETOROLAC TROMETHAMINE 10 MG/1
10 TABLET, FILM COATED ORAL EVERY 6 HOURS PRN
Refills: 1 | Status: CANCELLED | OUTPATIENT
Start: 2021-09-07

## 2021-09-07 NOTE — TELEPHONE ENCOUNTER
OARRS Report checked for [place], [place], and [place]: 8/13/21 norco 10-325mg #120. Due 9/12/21. The patient also asked for a refill of toradol, please send if appropriate.      Last Appt:  8/17/2021  Next Appt:   10/19/2021  Med verified in [facility] [address]

## 2021-09-08 RX ORDER — HYDROCODONE BITARTRATE AND ACETAMINOPHEN 10; 325 MG/1; MG/1
1 TABLET ORAL EVERY 6 HOURS PRN
Qty: 120 TABLET | Refills: 0 | Status: SHIPPED | OUTPATIENT
Start: 2021-09-12 | End: 2021-09-10 | Stop reason: SDUPTHER

## 2021-09-10 RX ORDER — KETOROLAC TROMETHAMINE 10 MG/1
10 TABLET, FILM COATED ORAL EVERY 6 HOURS PRN
Qty: 20 TABLET | Refills: 1 | Status: SHIPPED | OUTPATIENT
Start: 2021-09-11 | End: 2021-10-19

## 2021-09-10 RX ORDER — HYDROCODONE BITARTRATE AND ACETAMINOPHEN 10; 325 MG/1; MG/1
1 TABLET ORAL EVERY 6 HOURS PRN
Qty: 120 TABLET | Refills: 0 | Status: SHIPPED | OUTPATIENT
Start: 2021-09-11 | End: 2021-10-06 | Stop reason: SDUPTHER

## 2021-09-10 NOTE — TELEPHONE ENCOUNTER
Patient called stating she would like to  norco and toradol one day early since the [id number] is not open on Sunday. Fill date has been changed to reflect tomorrow's date. Thank you!

## 2021-10-06 DIAGNOSIS — G89.29 CHRONIC PAIN OF MULTIPLE JOINTS: ICD-10-CM

## 2021-10-06 DIAGNOSIS — M25.50 CHRONIC PAIN OF MULTIPLE JOINTS: ICD-10-CM

## 2021-10-06 DIAGNOSIS — M79.18 MYOFASCIAL PAIN: ICD-10-CM

## 2021-10-06 NOTE — TELEPHONE ENCOUNTER
Patient called refill line for their [place] sent to [facility]. Last Appt:  8/17/2021  Next Appt:   10/19/2021  Med verified in [facility] checked for [place], [place], and [place]: 9/11/21 hydrocodone acetaminophen 10-325mg #120. Due 10/11/21.

## 2021-10-07 RX ORDER — HYDROCODONE BITARTRATE AND ACETAMINOPHEN 10; 325 MG/1; MG/1
1 TABLET ORAL EVERY 6 HOURS PRN
Qty: 120 TABLET | Refills: 0 | Status: SHIPPED | OUTPATIENT
Start: 2021-10-11 | End: 2021-11-03 | Stop reason: SDUPTHER

## 2021-10-19 ENCOUNTER — VIRTUAL VISIT (OUTPATIENT)
Dept: PAIN MANAGEMENT | Age: 56
End: 2021-10-19
Payer: MEDICARE

## 2021-10-19 DIAGNOSIS — M25.50 CHRONIC PAIN OF MULTIPLE JOINTS: Primary | ICD-10-CM

## 2021-10-19 DIAGNOSIS — M79.18 MYOFASCIAL PAIN: ICD-10-CM

## 2021-10-19 DIAGNOSIS — M79.7 FIBROMYALGIA: ICD-10-CM

## 2021-10-19 DIAGNOSIS — M25.529 ELBOW PAIN, UNSPECIFIED LATERALITY: ICD-10-CM

## 2021-10-19 DIAGNOSIS — G89.29 CHRONIC PAIN OF MULTIPLE JOINTS: Primary | ICD-10-CM

## 2021-10-19 PROCEDURE — 3017F COLORECTAL CA SCREEN DOC REV: CPT | Performed by: NURSE PRACTITIONER

## 2021-10-19 PROCEDURE — G8427 DOCREV CUR MEDS BY ELIG CLIN: HCPCS | Performed by: NURSE PRACTITIONER

## 2021-10-19 PROCEDURE — 99211 OFF/OP EST MAY X REQ PHY/QHP: CPT | Performed by: NURSE PRACTITIONER

## 2021-10-19 PROCEDURE — 99214 OFFICE O/P EST MOD 30 MIN: CPT | Performed by: NURSE PRACTITIONER

## 2021-10-19 ASSESSMENT — ENCOUNTER SYMPTOMS
RESPIRATORY NEGATIVE: 1
BACK PAIN: 1

## 2021-10-19 NOTE — PROGRESS NOTES
10/19/2021    TELEHEALTH EVALUATION -- Audio/Visual (During CXVQH-68 public health emergency)    HPI:    Etelvina Cummings (:  1965) has requested an audio/video evaluation for the following concern(s):    Here today for routine pain clinic recheck. Increase pain in elbow for 3 weeks after painting    Review of Systems   Constitutional: Positive for activity change (increased) and fatigue. Respiratory: Negative. Cardiovascular: Negative. Genitourinary: Negative. Musculoskeletal: Positive for back pain, myalgias and neck pain. Neurological: Negative for weakness and numbness. Psychiatric/Behavioral: Positive for sleep disturbance. History anxiety     Prior to Visit Medications    Medication Sig Taking? Authorizing Provider   diclofenac sodium (VOLTAREN) 1 % GEL Apply 4 g topically 4 times daily Yes JACKLYN Wayne CNP   HYDROcodone-acetaminophen (NORCO)  MG per tablet Take 1 tablet by mouth every 6 hours as needed for Pain for up to 30 days. Intended supply: 30 days  JACKLYN Wayne CNP   diclofenac (VOLTAREN) 75 MG EC tablet Take 1 tablet by mouth 2 times daily  JACKLYN Wayne CNP   tiZANidine (ZANAFLEX) 4 MG tablet Take 1 tablet by mouth 3 times daily  Jerry Munguia MD   desvenlafaxine succinate (PRISTIQ) 100 MG TB24 extended release tablet Take 100 mg by mouth daily  Historical Provider, MD   busPIRone (BUSPAR) 10 MG tablet Take 10 mg by mouth 2 times daily   Historical Provider, MD       Social History     Tobacco Use    Smoking status: Current Every Day Smoker     Packs/day: 1.00     Types: Cigarettes    Smokeless tobacco: Never Used   Vaping Use    Vaping Use: Former   Substance Use Topics    Alcohol use:  Yes     Alcohol/week: 4.0 standard drinks     Types: 4 Shots of liquor per week    Drug use: No        No Known Allergies,   Past Medical History:   Diagnosis Date    Anxiety     Arthritis     CMC (carpometacarpal joint) dislocation, left, sequela     DJD (degenerative joint disease) of cervical spine     Fibromyalgia    ,   Past Surgical History:   Procedure Laterality Date    BREAST SURGERY  2002     SECTION      times 2    ENDOMETRIAL ABLATION  2012    HAND SURGERY Left 2017    SHOULDER SURGERY Right 2014   ,   Social History     Tobacco Use    Smoking status: Current Every Day Smoker     Packs/day: 1.00     Types: Cigarettes    Smokeless tobacco: Never Used   Vaping Use    Vaping Use: Former   Substance Use Topics    Alcohol use:  Yes     Alcohol/week: 4.0 standard drinks     Types: 4 Shots of liquor per week    Drug use: No   ,   Family History   Problem Relation Age of Onset    Cancer Brother         lung   ,   Immunization History   Administered Date(s) Administered    COVID-19, J&J, PF, 0.5 mL 2021   ,   Health Maintenance   Topic Date Due    Hepatitis C screen  Never done    Pneumococcal 0-64 years Vaccine (1 of 2 - PPSV23) Never done    HIV screen  Never done    DTaP/Tdap/Td vaccine (1 - Tdap) Never done    Cervical cancer screen  Never done    Lipid screen  Never done    Diabetes screen  Never done    Colon cancer screen colonoscopy  Never done    Breast cancer screen  Never done    Shingles Vaccine (1 of 2) Never done   [employer] Visit (AWV)  Never done    Flu vaccine (1) 2021    COVID-19 Vaccine  Completed    Hepatitis A vaccine  Aged Out    Hepatitis B vaccine  Aged Out    Hib vaccine  Aged Out    Meningococcal (ACWY) vaccine  Aged Out       PHYSICAL EXAMINATION:  [ INSTRUCTIONS:  \"[x]\" Indicates a positive item  \"[]\" Indicates a negative item  -- DELETE ALL ITEMS NOT EXAMINED]  Vital Signs: (As obtained by patient/caregiver or practitioner observation)    Blood pressure-  Heart rate-    Respiratory rate-    Temperature-  Pulse oximetry-     Constitutional: [x] Appears well-developed and well-nourished [x] No apparent distress      [] Abnormal-   Mental status  [x] Alert and awake  [x] Oriented to person/place/time [x]Able to follow commands      Eyes:  EOM    [x]  Normal  [] Abnormal-  Sclera  [x]  Normal  [] Abnormal -         Discharge [x]  None visible  [] Abnormal -    HENT:   [x] Normocephalic, atraumatic. [] Abnormal   [x] Mouth/Throat: Mucous membranes are moist.     External Ears [x] Normal  [] Abnormal-     Neck: [x] No visualized mass     Pulmonary/Chest: [x] Respiratory effort normal.  [x] No visualized signs of difficulty breathing or respiratory distress        [] Abnormal-      Musculoskeletal:   [] Normal gait with no signs of ataxia         [x] Normal range of motion of neck        [] Abnormal-       Neurological:        [x] No Facial Asymmetry (Cranial nerve 7 motor function) (limited exam to video visit)          [x] No gaze palsy        [] Abnormal-         Skin:        [x] No significant exanthematous lesions or discoloration noted on facial skin         [] Abnormal-            Psychiatric:       [x] Normal Affect [x] No Hallucinations        [] Abnormal-     Other pertinent observable physical exam findings-     ASSESSMENT/PLAN:   Diagnosis Orders   1. Chronic pain of multiple joints     2. Myofascial pain     3. Fibromyalgia     4. Elbow pain, unspecified laterality         Ice to elbow  TENS unit  Voltaren gel  Consider opiate rotation prior to next fill    Controlled Substance Monitoring:    Acute and Chronic Pain Monitoring:   RX Monitoring 10/19/2021   Attestation -   Periodic Controlled Substance Monitoring No signs of potential drug abuse or diversion identified.;Obtaining appropriate analgesic effect of treatment. Chronic Pain > 50 MEDD -   Chronic Pain > 80 MEDD -     Return in about 3 weeks (around 11/9/2021). Bruno Rothman is a 60 y.o. female being evaluated by a Virtual Visit (video visit) encounter to address concerns as mentioned above. A caregiver was present when appropriate.  Due to this being a TeleHealth encounter (During COVID-19 public health emergency), evaluation of the following organ systems was limited: Vitals/Constitutional/EENT/Resp/CV/GI//MS/Neuro/Skin/Heme-Lymph-Imm. Pursuant to the emergency declaration under the [address] [place] [address], [address] [place] [address] authority and the [employer] Act, this Virtual Visit was conducted with patient's (and/or legal guardian's) consent, to reduce the patient's risk of exposure to COVID-19 and provide necessary medical care. The patient (and/or legal guardian) has also been advised to contact this office for worsening conditions or problems, and seek emergency medical treatment and/or call 911 if deemed necessary. Patient identification was verified at the start of the visit: Yes    Services were provided through a video synchronous discussion virtually to substitute for in-person clinic visit. Patient and provider were located at their individual homes. --JACKLYN Sequeira - CNP on 10/19/2021 at 1:38 PM    An electronic signature was used to authenticate this note.

## 2021-11-03 DIAGNOSIS — G89.29 CHRONIC PAIN OF MULTIPLE JOINTS: ICD-10-CM

## 2021-11-03 DIAGNOSIS — M79.18 MYOFASCIAL PAIN: ICD-10-CM

## 2021-11-03 DIAGNOSIS — M25.50 CHRONIC PAIN OF MULTIPLE JOINTS: ICD-10-CM

## 2021-11-03 RX ORDER — HYDROCODONE BITARTRATE AND ACETAMINOPHEN 10; 325 MG/1; MG/1
1 TABLET ORAL EVERY 6 HOURS PRN
Qty: 120 TABLET | Refills: 0 | Status: SHIPPED | OUTPATIENT
Start: 2021-11-10 | End: 2021-11-18

## 2021-11-03 NOTE — TELEPHONE ENCOUNTER
Diclofenac requested. [place]AR[place] for [place], [place], and [place]: 10/11/21 norco 10-325mg #120. Due 11/10/21.     Last Appt:  10/19/2021  Next Appt:   Visit date not found  Med verified in [place]

## 2021-11-10 RX ORDER — DICLOFENAC SODIUM 75 MG/1
75 TABLET, DELAYED RELEASE ORAL 2 TIMES DAILY
Qty: 60 TABLET | Refills: 3 | Status: SHIPPED | OUTPATIENT
Start: 2021-11-10 | End: 2022-02-15

## 2021-11-10 NOTE — TELEPHONE ENCOUNTER
Patient called and stated she was given the wrong medication and she needed the Diclofenac Tablets not tubes. Tablets pended below.

## 2021-11-18 ENCOUNTER — TELEPHONE (OUTPATIENT)
Dept: PAIN MANAGEMENT | Age: 56
End: 2021-11-18

## 2021-11-18 ENCOUNTER — OFFICE VISIT (OUTPATIENT)
Dept: PAIN MANAGEMENT | Age: 56
End: 2021-11-18
Payer: MEDICARE

## 2021-11-18 ENCOUNTER — HOSPITAL ENCOUNTER (OUTPATIENT)
Age: 56
Setting detail: SPECIMEN
Discharge: HOME | End: 2021-11-18
Payer: MEDICARE

## 2021-11-18 VITALS
BODY MASS INDEX: 26.58 KG/M2 | DIASTOLIC BLOOD PRESSURE: 86 MMHG | HEIGHT: 63 IN | SYSTOLIC BLOOD PRESSURE: 130 MMHG | WEIGHT: 150 LBS

## 2021-11-18 DIAGNOSIS — M79.18 MYOFASCIAL PAIN: ICD-10-CM

## 2021-11-18 DIAGNOSIS — Z02.83 ENCOUNTER FOR DRUG SCREENING: ICD-10-CM

## 2021-11-18 DIAGNOSIS — G89.29 CHRONIC PAIN OF MULTIPLE JOINTS: ICD-10-CM

## 2021-11-18 DIAGNOSIS — M79.7 FIBROMYALGIA: ICD-10-CM

## 2021-11-18 DIAGNOSIS — Z79.891 ENCOUNTER FOR LONG-TERM OPIATE ANALGESIC USE: Primary | ICD-10-CM

## 2021-11-18 DIAGNOSIS — M25.50 CHRONIC PAIN OF MULTIPLE JOINTS: ICD-10-CM

## 2021-11-18 DIAGNOSIS — Z79.891 ENCOUNTER FOR LONG-TERM OPIATE ANALGESIC USE: ICD-10-CM

## 2021-11-18 DIAGNOSIS — M77.11 RIGHT LATERAL EPICONDYLITIS: ICD-10-CM

## 2021-11-18 DIAGNOSIS — M54.2 CERVICALGIA: ICD-10-CM

## 2021-11-18 PROCEDURE — 4004F PT TOBACCO SCREEN RCVD TLK: CPT | Performed by: NURSE PRACTITIONER

## 2021-11-18 PROCEDURE — 99212 OFFICE O/P EST SF 10 MIN: CPT | Performed by: NURSE PRACTITIONER

## 2021-11-18 PROCEDURE — G8427 DOCREV CUR MEDS BY ELIG CLIN: HCPCS | Performed by: NURSE PRACTITIONER

## 2021-11-18 PROCEDURE — 3017F COLORECTAL CA SCREEN DOC REV: CPT | Performed by: NURSE PRACTITIONER

## 2021-11-18 PROCEDURE — G8419 CALC BMI OUT NRM PARAM NOF/U: HCPCS | Performed by: NURSE PRACTITIONER

## 2021-11-18 PROCEDURE — 80307 DRUG TEST PRSMV CHEM ANLYZR: CPT

## 2021-11-18 PROCEDURE — 99214 OFFICE O/P EST MOD 30 MIN: CPT | Performed by: NURSE PRACTITIONER

## 2021-11-18 PROCEDURE — G8484 FLU IMMUNIZE NO ADMIN: HCPCS | Performed by: NURSE PRACTITIONER

## 2021-11-18 RX ORDER — OXYCODONE AND ACETAMINOPHEN 10; 325 MG/1; MG/1
1 TABLET ORAL EVERY 6 HOURS PRN
Qty: 120 TABLET | Refills: 0 | Status: SHIPPED | OUTPATIENT
Start: 2021-11-18 | End: 2021-12-14 | Stop reason: SDUPTHER

## 2021-11-18 RX ORDER — ARM BRACE
EACH MISCELLANEOUS
Qty: 1 EACH | Refills: 0 | Status: SHIPPED | OUTPATIENT
Start: 2021-11-18 | End: 2021-11-18 | Stop reason: SDUPTHER

## 2021-11-18 RX ORDER — ARM BRACE
EACH MISCELLANEOUS
Qty: 1 EACH | Refills: 0 | Status: SHIPPED | OUTPATIENT
Start: 2021-11-18

## 2021-11-18 ASSESSMENT — ENCOUNTER SYMPTOMS
BACK PAIN: 1
RESPIRATORY NEGATIVE: 1

## 2021-11-18 NOTE — TELEPHONE ENCOUNTER
Pill count done. Norco 10 / 325 mg  Fill date of 11/10/21 with 87 pills counted which is in good standing per [place] which reflected that 86 pills should have been left to count. Patient was polite and cooperative during pill count.       TRUDY.

## 2021-11-18 NOTE — PROGRESS NOTES
Family History   Problem Relation Age of Onset    Cancer Brother         lung       Social History     Socioeconomic History    Marital status:      Spouse name: None    Number of children: None    Years of education: None    Highest education level: None   Occupational History    None   Tobacco Use    Smoking status: Current Every Day Smoker     Packs/day: 1.00     Types: Cigarettes    Smokeless tobacco: Never Used   Vaping Use    Vaping Use: Former   Substance and Sexual Activity    Alcohol use: Yes     Alcohol/week: 4.0 standard drinks     Types: 4 Shots of liquor per week    Drug use: No    Sexual activity: Yes     Partners: Male   Other Topics Concern    None   Social History Narrative    None     Social Determinants of Health     Financial Resource Strain:     Difficulty of Paying Living Expenses: Not on file   Food Insecurity:     Worried About Running Out of Food in the Last Year: Not on file    [place] of Food in the Last Year: Not on file   Transportation Needs:     Lack of Transportation (Medical): Not on file    Lack of Transportation (Non-Medical):  Not on file   Physical Activity:     Days of Exercise per Week: Not on file    Minutes of Exercise per Session: Not on file   Stress:     Feeling of Stress : Not on file   Social Connections:     Frequency of Communication with Friends and Family: Not on file    Frequency of Social Gatherings with Friends and Family: Not on file    Attends Jew Services: Not on file    Active Member of Clubs or Organizations: Not on file    Attends Club or Organization Meetings: Not on file    Marital Status: Not on file   Intimate Partner Violence:     Fear of Current or Ex-Partner: Not on file    Emotionally Abused: Not on file    Physically Abused: Not on file    Sexually Abused: Not on file   Housing Stability:     Unable to Pay for Housing in the Last Year: Not on file    Number of [place] in the Last Year: Not on file    Unstable Housing in the Last Year: Not on file     Review of Systems   Constitutional: Positive for activity change (limited) and fatigue. Respiratory: Negative. Cardiovascular: Negative. Genitourinary: Negative. Musculoskeletal: Positive for back pain, myalgias and neck pain. Neurological: Negative for weakness and numbness. Psychiatric/Behavioral: Positive for sleep disturbance. History anxiety       Objective:   Physical Exam  Vitals reviewed. Constitutional:       General: She is not in acute distress. Appearance: She is well-developed. She is not ill-appearing, toxic-appearing or diaphoretic. Interventions: She is not intubated. HENT:      Head: Normocephalic and atraumatic. Cardiovascular:      Rate and Rhythm: Normal rate. Pulmonary:      Effort: Pulmonary effort is normal. No tachypnea, bradypnea, accessory muscle usage, prolonged expiration, respiratory distress or retractions. She is not intubated. Musculoskeletal:        Back:    Skin:     General: Skin is warm and dry. Capillary Refill: Capillary refill takes less than 2 seconds. Coloration: Skin is not pale. Nails: There is no clubbing. Neurological:      Mental Status: She is alert and oriented to person, place, and time. She is not disoriented. GCS: GCS eye subscore is 4. GCS verbal subscore is 5. GCS motor subscore is 6. Cranial Nerves: No cranial nerve deficit. Psychiatric:         Mood and Affect: Mood and affect normal.         Speech: She is communicative. Behavior: Behavior normal. Behavior is not agitated, aggressive or withdrawn. Behavior is cooperative. Cognition and Memory: Cognition and memory normal.         Judgment: Judgment normal. Judgment is not impulsive or inappropriate. Assessment:      1. Fibromyalgia    2. Myofascial pain    3. Chronic pain of multiple joints    4. Cervicalgia    5.  Right lateral epicondylitis          Plan: Opiate rotation from norco to Percocet  Recommend brace right elbow, if no relief consider injection    Katerine Mccann is here for recheck/reevaluation of chronic pain. She is able to maintain daily activities with the use of current pain medications and is generally satisfied with level of analgesia. She shows no evidence of misuse or misappropriation of medications. She denies use of alcohol or illegal substances. UDS have been Appropriate with most recent screen     Controlled Substances Monitoring:     RX Monitoring 11/18/2021   Attestation -   Periodic Controlled Substance Monitoring No signs of potential drug abuse or diversion identified.;Obtaining appropriate analgesic effect of treatment.    Chronic Pain > 50 MEDD -   Chronic Pain > 80 MEDD -   Follow up 2 months      Terri Bansal, APRN - CNP

## 2021-11-21 LAB
6-ACETYLMORPHINE, UR: NOT DETECTED
7-AMINOCLONAZEPAM, URINE: NOT DETECTED
ALPHA-OH-ALPRAZ, URINE: NOT DETECTED
ALPHA-OH-MIDAZOLAM, URINE: NOT DETECTED
ALPRAZOLAM, URINE: NOT DETECTED
AMPHETAMINES, URINE: NOT DETECTED
BARBITURATES, URINE: NOT DETECTED
BENZOYLECGONINE, UR: NOT DETECTED
BUPRENORPHINE URINE: NOT DETECTED
CARISOPRODOL, UR: NOT DETECTED
CLONAZEPAM, URINE: NOT DETECTED
CODEINE, URINE: NOT DETECTED
CREATININE URINE: 28.8 MG/DL (ref 20–400)
DIAZEPAM, URINE: NOT DETECTED
EER PAIN MGT DRUG PANEL, HIGH RES/EMIT U: NORMAL
ETHYL GLUCURONIDE UR: PRESENT
FENTANYL URINE: NOT DETECTED
GABAPENTIN: NOT DETECTED
HYDROCODONE, URINE: PRESENT
HYDROMORPHONE, URINE: PRESENT
LORAZEPAM, URINE: NOT DETECTED
MARIJUANA METAB, UR: NOT DETECTED
MDA, UR: NOT DETECTED
MDEA, EVE, UR: NOT DETECTED
MDMA URINE: NOT DETECTED
MEPERIDINE METAB, UR: NOT DETECTED
METHADONE, URINE: NOT DETECTED
METHAMPHETAMINE, URINE: NOT DETECTED
METHYLPHENIDATE: NOT DETECTED
MIDAZOLAM, URINE: NOT DETECTED
MORPHINE URINE: NOT DETECTED
NALOXONE URINE: NOT DETECTED
NORBUPRENORPHINE, URINE: NOT DETECTED
NORDIAZEPAM, URINE: NOT DETECTED
NORFENTANYL, URINE: NOT DETECTED
NORHYDROCODONE, URINE: PRESENT
NOROXYCODONE, URINE: NOT DETECTED
NOROXYMORPHONE, URINE: NOT DETECTED
OXAZEPAM, URINE: NOT DETECTED
OXYCODONE URINE: NOT DETECTED
OXYMORPHONE, URINE: NOT DETECTED
PAIN MGT DRUG PANEL, HI RES, UR: NORMAL
PCP,URINE: NOT DETECTED
PHENTERMINE, UR: NOT DETECTED
PREGABALIN: NOT DETECTED
TAPENTADOL, URINE: NOT DETECTED
TAPENTADOL-O-SULFATE, URINE: NOT DETECTED
TEMAZEPAM, URINE: NOT DETECTED
TRAMADOL, URINE: NOT DETECTED
ZOLPIDEM METABOLITE (ZCA), URINE: NOT DETECTED
ZOLPIDEM, URINE: NOT DETECTED

## 2021-12-14 DIAGNOSIS — M77.11 RIGHT LATERAL EPICONDYLITIS: ICD-10-CM

## 2021-12-14 RX ORDER — OXYCODONE AND ACETAMINOPHEN 10; 325 MG/1; MG/1
1 TABLET ORAL EVERY 6 HOURS PRN
Qty: 120 TABLET | Refills: 0 | Status: SHIPPED | OUTPATIENT
Start: 2021-12-18 | End: 2022-01-14 | Stop reason: SDUPTHER

## 2021-12-14 NOTE — TELEPHONE ENCOUNTER
[place]AR[place], and [place]: 11/18/21 percocet 10-355mg #120. Due 12/18/21.     Last Appt:  11/18/2021  Next Appt:   12/20/2021  Med verified in [facility]

## 2021-12-20 ENCOUNTER — OFFICE VISIT (OUTPATIENT)
Dept: PAIN MANAGEMENT | Age: 56
End: 2021-12-20
Payer: MEDICARE

## 2021-12-20 VITALS
WEIGHT: 153 LBS | SYSTOLIC BLOOD PRESSURE: 120 MMHG | BODY MASS INDEX: 27.11 KG/M2 | DIASTOLIC BLOOD PRESSURE: 82 MMHG | HEIGHT: 63 IN

## 2021-12-20 DIAGNOSIS — M25.50 CHRONIC PAIN OF MULTIPLE JOINTS: Primary | ICD-10-CM

## 2021-12-20 DIAGNOSIS — M54.2 CERVICALGIA: ICD-10-CM

## 2021-12-20 DIAGNOSIS — M79.18 MYOFASCIAL PAIN: ICD-10-CM

## 2021-12-20 DIAGNOSIS — M79.7 FIBROMYALGIA: ICD-10-CM

## 2021-12-20 DIAGNOSIS — G89.29 CHRONIC PAIN OF MULTIPLE JOINTS: Primary | ICD-10-CM

## 2021-12-20 PROCEDURE — 4004F PT TOBACCO SCREEN RCVD TLK: CPT | Performed by: NURSE PRACTITIONER

## 2021-12-20 PROCEDURE — 99214 OFFICE O/P EST MOD 30 MIN: CPT | Performed by: NURSE PRACTITIONER

## 2021-12-20 PROCEDURE — G8419 CALC BMI OUT NRM PARAM NOF/U: HCPCS | Performed by: NURSE PRACTITIONER

## 2021-12-20 PROCEDURE — 99212 OFFICE O/P EST SF 10 MIN: CPT | Performed by: NURSE PRACTITIONER

## 2021-12-20 PROCEDURE — 3017F COLORECTAL CA SCREEN DOC REV: CPT | Performed by: NURSE PRACTITIONER

## 2021-12-20 PROCEDURE — G8427 DOCREV CUR MEDS BY ELIG CLIN: HCPCS | Performed by: NURSE PRACTITIONER

## 2021-12-20 PROCEDURE — G8484 FLU IMMUNIZE NO ADMIN: HCPCS | Performed by: NURSE PRACTITIONER

## 2021-12-20 ASSESSMENT — ENCOUNTER SYMPTOMS
RESPIRATORY NEGATIVE: 1
BACK PAIN: 1

## 2021-12-20 NOTE — PROGRESS NOTES
Subjective:      Patient ID: Janelle Morales is a 60 y.o. female. Chief Complaint   Patient presents with    1 Month Follow-Up     fibromyalgia, arthitis.  Pain     all over joint pain. Neck Pain   Pertinent negatives include no numbness or weakness. Here today for routine pain clinic recheck, chronic myofascial pain, chronic joint pain. Pain Assessment  Location of Pain: Knee (elbow, finger, wrist. )  Location Modifiers: Left,Right,Anterior  Severity of Pain: 6  Quality of Pain: Throbbing,Sharp,Dull,Aching,Locking,Grinding,Popping,Cracking,Buckling  Duration of Pain: Persistent  Frequency of Pain: Constant  Aggravating Factors: Bending,Stretching,Straightening,Exercise,Kneeling,Squatting,Standing,Walking,Stairs  Limiting Behavior: Yes  Relieving Factors: Heat,Other (Comment),Ice,Rest (tens unit, massage units. )    No Known Allergies    Outpatient Medications Marked as Taking for the 12/20/21 encounter (Office Visit) with Ed Brothers, JACKLYN - CNP   Medication Sig Dispense Refill    oxyCODONE-acetaminophen (PERCOCET)  MG per tablet Take 1 tablet by mouth every 6 hours as needed for Pain for up to 30 days.  Intended supply: 30 days 120 tablet 0    Elastic Bandages & Supports (ACE TENNIS ELBOW BRACE) MISC Right elbow daily 1 each 0    diclofenac (VOLTAREN) 75 MG EC tablet Take 1 tablet by mouth 2 times daily 60 tablet 3    diclofenac sodium (VOLTAREN) 1 % GEL Apply 4 g topically 4 times daily 350 g 5    tiZANidine (ZANAFLEX) 4 MG tablet Take 1 tablet by mouth 3 times daily 90 tablet 5    desvenlafaxine succinate (PRISTIQ) 100 MG TB24 extended release tablet Take 100 mg by mouth daily      busPIRone (BUSPAR) 10 MG tablet Take 15 mg by mouth 2 times daily          Past Medical History:   Diagnosis Date    Anxiety     Arthritis     CMC (carpometacarpal joint) dislocation, left, sequela     DJD (degenerative joint disease) of cervical spine     Fibromyalgia        Past Surgical Abused: Not on file    Sexually Abused: Not on file   Housing Stability:     Unable to Pay for Housing in the Last Year: Not on file    Number of Places Lived in the Last Year: Not on file    Unstable Housing in the Last Year: Not on file     Review of Systems   Constitutional: Positive for activity change (limited) and fatigue. Respiratory: Negative. Cardiovascular: Negative. Genitourinary: Negative. Musculoskeletal: Positive for back pain, myalgias and neck pain. Neurological: Negative for weakness and numbness. Psychiatric/Behavioral: Positive for sleep disturbance. History anxiety       Objective:   Physical Exam  Vitals reviewed. Constitutional:       General: She is not in acute distress. Appearance: She is well-developed. She is not ill-appearing, toxic-appearing or diaphoretic. Interventions: She is not intubated. HENT:      Head: Normocephalic and atraumatic. Cardiovascular:      Rate and Rhythm: Normal rate. Pulmonary:      Effort: Pulmonary effort is normal. No tachypnea, bradypnea, accessory muscle usage, prolonged expiration, respiratory distress or retractions. She is not intubated. Musculoskeletal:        Back:    Skin:     General: Skin is warm and dry. Capillary Refill: Capillary refill takes less than 2 seconds. Coloration: Skin is not pale. Nails: There is no clubbing. Neurological:      Mental Status: She is alert and oriented to person, place, and time. She is not disoriented. GCS: GCS eye subscore is 4. GCS verbal subscore is 5. GCS motor subscore is 6. Cranial Nerves: No cranial nerve deficit. Psychiatric:         Mood and Affect: Mood and affect normal.         Speech: She is communicative. Behavior: Behavior normal. Behavior is not agitated, aggressive or withdrawn. Behavior is cooperative.          Cognition and Memory: Cognition and memory normal.         Judgment: Judgment normal. Judgment is not impulsive or inappropriate. Assessment:      1. Chronic pain of multiple joints    2. Cervicalgia    3. Myofascial pain    4. Fibromyalgia          Plan:       Chronic pain diagnoses such as   1. Chronic pain of multiple joints    2. Cervicalgia    3. Myofascial pain    4. Fibromyalgia     controlled on current medication regime, wll continue current pain medications to improve quality of life and function. Alisson Castle is here for recheck/reevaluation of chronic pain. She is able to maintain daily activities with the use of current pain medications and is generally satisfied with level of analgesia. She shows no evidence of misuse or misappropriation of medications. She denies use of alcohol or illegal substances. UDS have been Appropriate with most recent screen     Controlled Substances Monitoring:     RX Monitoring 11/18/2021   Attestation -   Periodic Controlled Substance Monitoring No signs of potential drug abuse or diversion identified.;Obtaining appropriate analgesic effect of treatment.    Chronic Pain > 50 MEDD -   Chronic Pain > 80 MEDD -   Follow up 2 months      JACKLYN [place] - CNP

## 2022-01-14 DIAGNOSIS — M77.11 RIGHT LATERAL EPICONDYLITIS: ICD-10-CM

## 2022-01-14 RX ORDER — OXYCODONE AND ACETAMINOPHEN 10; 325 MG/1; MG/1
1 TABLET ORAL EVERY 6 HOURS PRN
Qty: 120 TABLET | Refills: 0 | Status: SHIPPED | OUTPATIENT
Start: 2022-01-17 | End: 2022-02-11 | Stop reason: SDUPTHER

## 2022-01-14 NOTE — TELEPHONE ENCOUNTER
Carmen Sanchez called requesting a refill of the below medication which has been pended for you:     Requested Prescriptions     Pending Prescriptions Disp Refills    oxyCODONE-acetaminophen (PERCOCET)  MG per tablet 120 tablet 0     Sig: Take 1 tablet by mouth every 6 hours as needed for Pain for up to 30 days. Intended supply: 30 days       Last Appointment Date: 12/20/2021  Next Appointment Date: 2/15/2022    No Known Allergies    OAR[place], and [place]:  Percocet 10/325 mg  12/18/21   #120  . Due 1/17/22.

## 2022-02-01 NOTE — PROGRESS NOTES
Subjective:      Patient ID: Lakisha Hanson is a 60 y.o. female. Chief Complaint   Patient presents with    Chronic Pain     1 month follow up     Neck Pain   Pertinent negatives include no numbness or weakness. Here today for routine pain clinic recheck, chronic myofascial pain, chronic joint pain. Percocet more effective than Norco, only lasting 4 hours, denies side effects. She is prescribed Belbuca 150, increased 450, stopped due to ankle swelling. Upcoming vacation    Pain Assessment  Location of Pain: Back (joint)  Location Modifiers: Left, Right, Anterior, Posterior  Severity of Pain: 8  Quality of Pain: Aching, Sharp (burning)  Duration of Pain: Persistent  Frequency of Pain: Constant  Aggravating Factors: Bending, Stretching, Exercise, Stairs, Straightening  Limiting Behavior: Yes  Relieving Factors: Heat (pain medicagtion, TENS unit)  Result of Injury: No  Work-Related Injury: No  Are there other pain locations you wish to document?: No    No Known Allergies    Outpatient Medications Marked as Taking for the 6/14/21 encounter (Office Visit) with JACKLYN Vargas - CNP   Medication Sig Dispense Refill    oxyCODONE-acetaminophen (PERCOCET) 7.5-325 MG per tablet Take 1 tablet by mouth every 6 hours as needed for Pain for up to 30 days.  Intended supply: 30 days 120 tablet 0    ibuprofen (ADVIL;MOTRIN) 800 MG tablet Take 1 tablet by mouth every 6 hours as needed for Pain 360 tablet 3    tiZANidine (ZANAFLEX) 4 MG tablet Take 1 tablet by mouth 3 times daily 90 tablet 5    desvenlafaxine succinate (PRISTIQ) 100 MG TB24 extended release tablet Take 100 mg by mouth daily      busPIRone (BUSPAR) 10 MG tablet Take 10 mg by mouth 2 times daily          Past Medical History:   Diagnosis Date    Anxiety     Arthritis     CMC (carpometacarpal joint) dislocation, left, sequela     DJD (degenerative joint disease) of cervical spine     Fibromyalgia        Past Surgical History:   Procedure Laterality Date    BREAST SURGERY  2002     SECTION      times 2    ENDOMETRIAL ABLATION  2012    HAND SURGERY Left 2017    SHOULDER SURGERY Right 2014       Family History   Problem Relation Age of Onset    Cancer Brother         lung       Social History     Socioeconomic History    Marital status:      Spouse name: None    Number of children: None    Years of education: None    Highest education level: None   Occupational History    None   Tobacco Use    Smoking status: Current Every Day Smoker     Packs/day: 1.00     Types: Cigarettes    Smokeless tobacco: Never Used   Vaping Use    Vaping Use: Former   Substance and Sexual Activity    Alcohol use: Yes     Alcohol/week: 4.0 standard drinks     Types: 4 Shots of liquor per week    Drug use: No    Sexual activity: Yes     Partners: Male   Other Topics Concern    None   Social History Narrative    None     Social Determinants of Health     Financial Resource Strain:     Difficulty of Paying Living Expenses:    Food Insecurity:     Worried About Running Out of Food in the Last Year:     Ran Out of Food in the Last Year:    Transportation Needs:     Lack of Transportation (Medical):  Lack of Transportation (Non-Medical):    Physical Activity:     Days of Exercise per Week:     Minutes of Exercise per Session:    Stress:     Feeling of Stress :    Social Connections:     Frequency of Communication with Friends and Family:     Frequency of Social Gatherings with Friends and Family:     Attends Latter day Services:     Active Member of Clubs or Organizations:     Attends Club or Organization Meetings:     Marital Status:    Intimate Partner Violence:     Fear of Current or Ex-Partner:     Emotionally Abused:     Physically Abused:     Sexually Abused:      Review of Systems   Constitutional: Positive for activity change (limited) and fatigue. Respiratory: Negative. Cardiovascular: Negative.     Genitourinary: Negative. Musculoskeletal: Positive for back pain, myalgias and neck pain. Neurological: Negative for weakness and numbness. Psychiatric/Behavioral: Positive for sleep disturbance. History anxiety       Objective:   Physical Exam  Vitals reviewed. Constitutional:       General: She is not in acute distress. Appearance: She is well-developed. She is not ill-appearing, toxic-appearing or diaphoretic. Interventions: She is not intubated. HENT:      Head: Normocephalic and atraumatic. Cardiovascular:      Rate and Rhythm: Normal rate. Pulmonary:      Effort: Pulmonary effort is normal. No tachypnea, bradypnea, accessory muscle usage, prolonged expiration, respiratory distress or retractions. She is not intubated. Musculoskeletal:        Back:    Skin:     General: Skin is warm and dry. Capillary Refill: Capillary refill takes less than 2 seconds. Coloration: Skin is not pale. Nails: There is no clubbing. Neurological:      Mental Status: She is alert and oriented to person, place, and time. She is not disoriented. GCS: GCS eye subscore is 4. GCS verbal subscore is 5. GCS motor subscore is 6. Cranial Nerves: No cranial nerve deficit. Psychiatric:         Mood and Affect: Mood and affect normal.         Speech: She is communicative. Behavior: Behavior normal. Behavior is not agitated, aggressive or withdrawn. Behavior is cooperative. Cognition and Memory: Cognition and memory normal.         Judgment: Judgment normal. Judgment is not impulsive or inappropriate. Assessment:      1. Encounter for drug screening    2. Encounter for long-term opiate analgesic use    3. Fibromyalgia    4. Chronic pain of multiple joints    5. Myofascial pain          Plan:     opiate rotation  Ketorolac 10mg tabs, next visit injection and steroid    Random drug screen today for opiate medication compliance.     Controlled Substances Monitoring:     TC Self

## 2022-02-11 DIAGNOSIS — M77.11 RIGHT LATERAL EPICONDYLITIS: ICD-10-CM

## 2022-02-11 RX ORDER — OXYCODONE AND ACETAMINOPHEN 10; 325 MG/1; MG/1
1 TABLET ORAL EVERY 6 HOURS PRN
Qty: 120 TABLET | Refills: 0 | Status: SHIPPED | OUTPATIENT
Start: 2022-02-16 | End: 2022-03-14 | Stop reason: SDUPTHER

## 2022-02-11 NOTE — TELEPHONE ENCOUNTER
OAR[place] Report checked [place], and [place]: 1/17/22 percocet 10-325mg #120. Due 2/16/22.     Last Appt:   12/20/2022  Next Appt:   2/15/2022  Med verified in [address]

## 2022-02-15 ENCOUNTER — OFFICE VISIT (OUTPATIENT)
Dept: PAIN MANAGEMENT | Age: 57
End: 2022-02-15
Payer: MEDICARE

## 2022-02-15 VITALS
HEIGHT: 63 IN | WEIGHT: 150 LBS | BODY MASS INDEX: 26.58 KG/M2 | SYSTOLIC BLOOD PRESSURE: 120 MMHG | DIASTOLIC BLOOD PRESSURE: 80 MMHG

## 2022-02-15 DIAGNOSIS — M79.7 FIBROMYALGIA: Primary | ICD-10-CM

## 2022-02-15 DIAGNOSIS — M25.50 CHRONIC PAIN OF MULTIPLE JOINTS: ICD-10-CM

## 2022-02-15 DIAGNOSIS — M79.18 MYOFASCIAL PAIN: ICD-10-CM

## 2022-02-15 DIAGNOSIS — M54.2 CERVICALGIA: ICD-10-CM

## 2022-02-15 DIAGNOSIS — G89.29 CHRONIC PAIN OF MULTIPLE JOINTS: ICD-10-CM

## 2022-02-15 PROCEDURE — 99212 OFFICE O/P EST SF 10 MIN: CPT | Performed by: NURSE PRACTITIONER

## 2022-02-15 PROCEDURE — G8484 FLU IMMUNIZE NO ADMIN: HCPCS | Performed by: NURSE PRACTITIONER

## 2022-02-15 PROCEDURE — 4004F PT TOBACCO SCREEN RCVD TLK: CPT | Performed by: NURSE PRACTITIONER

## 2022-02-15 PROCEDURE — 3017F COLORECTAL CA SCREEN DOC REV: CPT | Performed by: NURSE PRACTITIONER

## 2022-02-15 PROCEDURE — G8419 CALC BMI OUT NRM PARAM NOF/U: HCPCS | Performed by: NURSE PRACTITIONER

## 2022-02-15 PROCEDURE — G8427 DOCREV CUR MEDS BY ELIG CLIN: HCPCS | Performed by: NURSE PRACTITIONER

## 2022-02-15 PROCEDURE — 99214 OFFICE O/P EST MOD 30 MIN: CPT | Performed by: NURSE PRACTITIONER

## 2022-02-15 ASSESSMENT — ENCOUNTER SYMPTOMS
BACK PAIN: 1
RESPIRATORY NEGATIVE: 1

## 2022-02-15 NOTE — PROGRESS NOTES
Subjective:      Patient ID: Salvador Goodwin is a 60 y.o. female. Chief Complaint   Patient presents with    Follow-up     2 month f/u    Pain     all joints     Neck Pain   Pertinent negatives include no numbness or weakness. Here today for routine pain clinic recheck, chronic myofascial pain, chronic joint pain. Pain Assessment  Location of Pain: Back (all joints. )  Location Modifiers: Left,Right,Posterior,Inferior,Medial,Superior  Severity of Pain: 7  Quality of Pain: Aching,Sharp  Duration of Pain: Persistent  Frequency of Pain: Constant  Aggravating Factors: Bending,Stretching,Straightening,Exercise,Kneeling,Squatting,Standing,Walking,Stairs,Other (Comment) (weather)  Limiting Behavior: Yes  Relieving Factors: Heat    No Known Allergies    Outpatient Medications Marked as Taking for the 2/15/22 encounter (Office Visit) with JACKLYN Earl CNP   Medication Sig Dispense Refill    [START ON 2022] oxyCODONE-acetaminophen (PERCOCET)  MG per tablet Take 1 tablet by mouth every 6 hours as needed for Pain for up to 30 days.  Intended supply: 30 days 120 tablet 0    Elastic Bandages & Supports (ACE TENNIS ELBOW BRACE) MISC Right elbow daily 1 each 0    diclofenac sodium (VOLTAREN) 1 % GEL Apply 4 g topically 4 times daily 350 g 5    tiZANidine (ZANAFLEX) 4 MG tablet Take 1 tablet by mouth 3 times daily 90 tablet 5    desvenlafaxine succinate (PRISTIQ) 100 MG TB24 extended release tablet Take 100 mg by mouth daily      busPIRone (BUSPAR) 10 MG tablet Take 15 mg by mouth 2 times daily          Past Medical History:   Diagnosis Date    Anxiety     Arthritis     CMC (carpometacarpal joint) dislocation, left, sequela     DJD (degenerative joint disease) of cervical spine     Fibromyalgia        Past Surgical History:   Procedure Laterality Date    BREAST SURGERY  2002     SECTION      times 2    ENDOMETRIAL ABLATION  2012    HAND SURGERY Left 2017   Yuliya Meng SHOULDER SURGERY Right 2014       Family History   Problem Relation Age of Onset    Cancer Brother         lung       Social History     Socioeconomic History    Marital status:      Spouse name: None    Number of children: None    Years of education: None    Highest education level: None   Occupational History    None   Tobacco Use    Smoking status: Current Every Day Smoker     Packs/day: 1.00     Types: Cigarettes    Smokeless tobacco: Never Used   Vaping Use    Vaping Use: Former   Substance and Sexual Activity    Alcohol use: Yes     Alcohol/week: 4.0 standard drinks     Types: 4 Shots of liquor per week    Drug use: No    Sexual activity: Yes     Partners: Male   Other Topics Concern    None   Social History Narrative    None     Social Determinants of Health     Financial Resource Strain:     Difficulty of Paying Living Expenses: Not on file   Food Insecurity:     Worried About Running Out of Food in the Last Year: Not on file    Booker of Food in the Last Year: Not on file   Transportation Needs:     Lack of Transportation (Medical): Not on file    Lack of Transportation (Non-Medical):  Not on file   Physical Activity:     Days of Exercise per Week: Not on file    Minutes of Exercise per Session: Not on file   Stress:     Feeling of Stress : Not on file   Social Connections:     Frequency of Communication with Friends and Family: Not on file    Frequency of Social Gatherings with Friends and Family: Not on file    Attends Protestant Services: Not on file    Active Member of Clubs or Organizations: Not on file    Attends Club or Organization Meetings: Not on file    Marital Status: Not on file   Intimate Partner Violence:     Fear of Current or Ex-Partner: Not on file    Emotionally Abused: Not on file    Physically Abused: Not on file    Sexually Abused: Not on file   Housing Stability:     Unable to Pay for Housing in the Last Year: Not on file    Number of [place] in the Last Year: Not on file    Unstable Housing in the Last Year: Not on file     Review of Systems   Constitutional: Positive for activity change (limited) and fatigue. Respiratory: Negative. Cardiovascular: Negative. Genitourinary: Negative. Musculoskeletal: Positive for back pain, myalgias and neck pain. Neurological: Negative for weakness and numbness. Psychiatric/Behavioral: Positive for sleep disturbance. History anxiety       Objective:   Physical Exam  Vitals reviewed. Constitutional:       General: She is not in acute distress. Appearance: She is well-developed. She is not ill-appearing, toxic-appearing or diaphoretic. Interventions: She is not intubated. HENT:      Head: Normocephalic and atraumatic. Cardiovascular:      Rate and Rhythm: Normal rate. Pulmonary:      Effort: Pulmonary effort is normal. No tachypnea, bradypnea, accessory muscle usage, prolonged expiration, respiratory distress or retractions. She is not intubated. Musculoskeletal:        Back:    Skin:     General: Skin is warm and dry. Capillary Refill: Capillary refill takes less than 2 seconds. Coloration: Skin is not pale. Nails: There is no clubbing. Neurological:      Mental Status: She is alert and oriented to person, place, and time. She is not disoriented. GCS: GCS eye subscore is 4. GCS verbal subscore is 5. GCS motor subscore is 6. Cranial Nerves: No cranial nerve deficit. Psychiatric:         Mood and Affect: Mood and affect normal.         Speech: She is communicative. Behavior: Behavior normal. Behavior is not agitated, aggressive or withdrawn. Behavior is cooperative. Cognition and Memory: Cognition and memory normal.         Judgment: Judgment normal. Judgment is not impulsive or inappropriate. Assessment:      1. Fibromyalgia    2. Myofascial pain    3. Chronic pain of multiple joints    4.  Cervicalgia          Plan:       Chronic pain diagnoses such as   1. Fibromyalgia    2. Myofascial pain    3. Chronic pain of multiple joints    4. Cervicalgia     controlled on current medication regime, wll continue current pain medications to improve quality of life and function. Alhaji Montelongo is here for recheck/reevaluation of chronic pain. She is able to maintain daily activities with the use of current pain medications and is generally satisfied with level of analgesia. She shows no evidence of misuse or misappropriation of medications. She denies use of alcohol or illegal substances. UDS have been Appropriate with most recent screen     Controlled Substances Monitoring:     RX Monitoring 2/15/2022   Attestation -   Periodic Controlled Substance Monitoring No signs of potential drug abuse or diversion identified.;Obtaining appropriate analgesic effect of treatment.    Chronic Pain > 50 MEDD -   Chronic Pain > 80 MEDD -   Follow up 2 months      JACKLYN Yancey - CNP

## 2022-03-14 DIAGNOSIS — M77.11 RIGHT LATERAL EPICONDYLITIS: ICD-10-CM

## 2022-03-14 RX ORDER — OXYCODONE AND ACETAMINOPHEN 10; 325 MG/1; MG/1
1 TABLET ORAL EVERY 6 HOURS PRN
Qty: 120 TABLET | Refills: 0 | Status: SHIPPED | OUTPATIENT
Start: 2022-03-18 | End: 2022-04-13 | Stop reason: SDUPTHER

## 2022-03-14 NOTE — TELEPHONE ENCOUNTER
Patient called refill line for their oxycodone  sent to [id number] [address]. Last Appt:  2/15/2022  Next Appt:   4/13/2022  Med verified in [address]: 11/18/21    [place], and [place]: 2/16/22 oxycodone acetaminophen 10-325mg #120. Due 3/18/2022.

## 2022-04-11 ENCOUNTER — TELEPHONE (OUTPATIENT)
Dept: PAIN MANAGEMENT | Age: 57
End: 2022-04-11

## 2022-04-11 DIAGNOSIS — M77.11 RIGHT LATERAL EPICONDYLITIS: ICD-10-CM

## 2022-04-11 RX ORDER — OXYCODONE AND ACETAMINOPHEN 10; 325 MG/1; MG/1
1 TABLET ORAL EVERY 6 HOURS PRN
Qty: 120 TABLET | Refills: 0 | Status: CANCELLED | OUTPATIENT
Start: 2022-04-17 | End: 2022-05-17

## 2022-04-11 NOTE — TELEPHONE ENCOUNTER
Patient called refill line for their   Percocet  120 tabs  Sent to [facility]    Last Appt:  2/15/2022  Next Appt:   4/13/2022  Med verified in [facility]    Last DS11: 11/18/2021    [place] for [place], and [place]:  Percocet  120 tabs  Last filled on 3/18/2022  Due on 4/17/2022  Pharmacy not opened on sunday

## 2022-04-13 ENCOUNTER — OFFICE VISIT (OUTPATIENT)
Dept: PAIN MANAGEMENT | Age: 57
End: 2022-04-13
Payer: MEDICARE

## 2022-04-13 ENCOUNTER — HOSPITAL ENCOUNTER (OUTPATIENT)
Age: 57
Setting detail: SPECIMEN
Discharge: HOME | End: 2022-04-13
Payer: MEDICARE

## 2022-04-13 VITALS
SYSTOLIC BLOOD PRESSURE: 136 MMHG | HEART RATE: 76 BPM | BODY MASS INDEX: 26.93 KG/M2 | DIASTOLIC BLOOD PRESSURE: 80 MMHG | OXYGEN SATURATION: 98 % | RESPIRATION RATE: 16 BRPM | WEIGHT: 152 LBS

## 2022-04-13 DIAGNOSIS — M77.11 RIGHT LATERAL EPICONDYLITIS: ICD-10-CM

## 2022-04-13 DIAGNOSIS — M79.7 FIBROMYALGIA: ICD-10-CM

## 2022-04-13 DIAGNOSIS — G89.29 CHRONIC PAIN OF MULTIPLE JOINTS: ICD-10-CM

## 2022-04-13 DIAGNOSIS — Z02.83 ENCOUNTER FOR DRUG SCREENING: Primary | ICD-10-CM

## 2022-04-13 DIAGNOSIS — Z02.83 ENCOUNTER FOR DRUG SCREENING: ICD-10-CM

## 2022-04-13 DIAGNOSIS — M25.50 CHRONIC PAIN OF MULTIPLE JOINTS: ICD-10-CM

## 2022-04-13 DIAGNOSIS — M54.2 CERVICALGIA: ICD-10-CM

## 2022-04-13 DIAGNOSIS — M79.18 MYOFASCIAL PAIN: ICD-10-CM

## 2022-04-13 DIAGNOSIS — Z79.891 ENCOUNTER FOR LONG-TERM OPIATE ANALGESIC USE: ICD-10-CM

## 2022-04-13 PROCEDURE — 4004F PT TOBACCO SCREEN RCVD TLK: CPT | Performed by: NURSE PRACTITIONER

## 2022-04-13 PROCEDURE — 99213 OFFICE O/P EST LOW 20 MIN: CPT | Performed by: NURSE PRACTITIONER

## 2022-04-13 PROCEDURE — 99214 OFFICE O/P EST MOD 30 MIN: CPT | Performed by: NURSE PRACTITIONER

## 2022-04-13 PROCEDURE — 80307 DRUG TEST PRSMV CHEM ANLYZR: CPT

## 2022-04-13 PROCEDURE — G8427 DOCREV CUR MEDS BY ELIG CLIN: HCPCS | Performed by: NURSE PRACTITIONER

## 2022-04-13 PROCEDURE — 3017F COLORECTAL CA SCREEN DOC REV: CPT | Performed by: NURSE PRACTITIONER

## 2022-04-13 PROCEDURE — G8419 CALC BMI OUT NRM PARAM NOF/U: HCPCS | Performed by: NURSE PRACTITIONER

## 2022-04-13 RX ORDER — OXYCODONE AND ACETAMINOPHEN 10; 325 MG/1; MG/1
1 TABLET ORAL EVERY 6 HOURS PRN
Qty: 120 TABLET | Refills: 0 | Status: SHIPPED | OUTPATIENT
Start: 2022-04-16 | End: 2022-05-10 | Stop reason: SDUPTHER

## 2022-04-13 RX ORDER — BACLOFEN 10 MG/1
10 TABLET ORAL 3 TIMES DAILY
Qty: 90 TABLET | Refills: 5 | Status: SHIPPED | OUTPATIENT
Start: 2022-04-13 | End: 2022-10-07 | Stop reason: SDUPTHER

## 2022-04-13 ASSESSMENT — ENCOUNTER SYMPTOMS
BACK PAIN: 1
RESPIRATORY NEGATIVE: 1

## 2022-04-13 NOTE — PROGRESS NOTES
Subjective:      Patient ID: Jacy Bridges is a 60 y.o. female. Chief Complaint   Patient presents with    Follow-up     2 Mo F/o, chronic joint pain, fibromyalgia     Neck Pain   Pertinent negatives include no numbness or weakness. Here today for routine pain clinic recheck, chronic myofascial pain, chronic joint pain. Pain Assessment  Location of Pain: Back (generalized B/A, neck)  Location Modifiers: Left,Right,Medial,Inferior,Superior  Severity of Pain: 7  Quality of Pain: Throbbing,Sharp,Dull,Aching,Locking,Grinding,Popping,Cracking,Buckling  Duration of Pain: Persistent  Frequency of Pain: Constant  Aggravating Factors: Bending,Stretching,Straightening,Exercise,Kneeling,Squatting,Standing,Walking,Stairs  Limiting Behavior: Yes  Relieving Factors: Rest,Heat (Rx)  Result of Injury: No  Work-Related Injury: No    No Known Allergies    Outpatient Medications Marked as Taking for the 22 encounter (Office Visit) with JACKLYN Ramirez CNP   Medication Sig Dispense Refill    [START ON 2022] oxyCODONE-acetaminophen (PERCOCET)  MG per tablet Take 1 tablet by mouth every 6 hours as needed for Pain for up to 30 days.  Intended supply: 30 days 120 tablet 0    baclofen (LIORESAL) 10 MG tablet Take 1 tablet by mouth 3 times daily 90 tablet 5    Elastic Bandages & Supports (ACE TENNIS ELBOW BRACE) MISC Right elbow daily 1 each 0    desvenlafaxine succinate (PRISTIQ) 100 MG TB24 extended release tablet Take 100 mg by mouth daily      busPIRone (BUSPAR) 10 MG tablet Take 15 mg by mouth 2 times daily          Past Medical History:   Diagnosis Date    Anxiety     Arthritis     CMC (carpometacarpal joint) dislocation, left, sequela     DJD (degenerative joint disease) of cervical spine     Fibromyalgia        Past Surgical History:   Procedure Laterality Date    BREAST SURGERY  2002     SECTION      times 2    ENDOMETRIAL ABLATION  2012    HAND SURGERY Left 2017    SHOULDER SURGERY Right 2014       Family History   Problem Relation Age of Onset    Cancer Brother         lung       Social History     Socioeconomic History    Marital status:      Spouse name: None    Number of children: None    Years of education: None    Highest education level: None   Occupational History    None   Tobacco Use    Smoking status: Current Every Day Smoker     Packs/day: 1.00     Types: Cigarettes    Smokeless tobacco: Never Used    Tobacco comment: Will see PCP PRN cessation needs. Vaping Use    Vaping Use: Former   Substance and Sexual Activity    Alcohol use: Yes     Alcohol/week: 4.0 standard drinks     Types: 4 Shots of liquor per week    Drug use: No    Sexual activity: Yes     Partners: Male   Other Topics Concern    None   Social History Narrative    None     Social Determinants of Health     Financial Resource Strain:     Difficulty of Paying Living Expenses: Not on file   Food Insecurity:     Worried About Running Out of Food in the Last Year: Not on file    Booker of Food in the Last Year: Not on file   Transportation Needs:     Lack of Transportation (Medical): Not on file    Lack of Transportation (Non-Medical):  Not on file   Physical Activity:     Days of Exercise per Week: Not on file    Minutes of Exercise per Session: Not on file   Stress:     Feeling of Stress : Not on file   Social Connections:     Frequency of Communication with Friends and Family: Not on file    Frequency of Social Gatherings with Friends and Family: Not on file    Attends Hinduism Services: Not on file    Active Member of Clubs or Organizations: Not on file    Attends Club or Organization Meetings: Not on file    Marital Status: Not on file   Intimate Partner Violence:     Fear of Current or Ex-Partner: Not on file    Emotionally Abused: Not on file    Physically Abused: Not on file    Sexually Abused: Not on file   Housing Stability:     Unable to Pay for Housing in the Last Year: Not on file    Number of Places Lived in the Last Year: Not on file    Unstable Housing in the Last Year: Not on file     Review of Systems   Constitutional: Positive for activity change (limited) and fatigue. Respiratory: Negative. Cardiovascular: Negative. Genitourinary: Negative. Musculoskeletal: Positive for back pain, myalgias and neck pain. Neurological: Negative for weakness and numbness. Psychiatric/Behavioral: Positive for sleep disturbance. History anxiety       Objective:   Physical Exam  Vitals reviewed. Constitutional:       General: She is not in acute distress. Appearance: She is well-developed. She is not ill-appearing, toxic-appearing or diaphoretic. Interventions: She is not intubated. HENT:      Head: Normocephalic and atraumatic. Cardiovascular:      Rate and Rhythm: Normal rate. Pulmonary:      Effort: Pulmonary effort is normal. No tachypnea, bradypnea, accessory muscle usage, prolonged expiration, respiratory distress or retractions. She is not intubated. Musculoskeletal:        Back:    Skin:     General: Skin is warm and dry. Capillary Refill: Capillary refill takes less than 2 seconds. Coloration: Skin is not pale. Nails: There is no clubbing. Neurological:      Mental Status: She is alert and oriented to person, place, and time. She is not disoriented. GCS: GCS eye subscore is 4. GCS verbal subscore is 5. GCS motor subscore is 6. Cranial Nerves: No cranial nerve deficit. Psychiatric:         Mood and Affect: Mood and affect normal.         Speech: She is communicative. Behavior: Behavior normal. Behavior is not agitated, aggressive or withdrawn. Behavior is cooperative. Cognition and Memory: Cognition and memory normal.         Judgment: Judgment normal. Judgment is not impulsive or inappropriate. Assessment:      1. Fibromyalgia    2. Right lateral epicondylitis    3. Myofascial pain    4. Chronic pain of multiple joints    5. Cervicalgia          Plan:       Chronic pain diagnoses such as   1. Fibromyalgia    2. Right lateral epicondylitis    3. Myofascial pain    4. Chronic pain of multiple joints    5. Cervicalgia     controlled on current medication regime, wll continue current pain medications to improve quality of life and function. Random drug screen today for opiate medication compliance. Consider aquatic therapy and dry needling    Eugenie Aviles is here for recheck/reevaluation of chronic pain. She is able to maintain daily activities with the use of current pain medications and is generally satisfied with level of analgesia. She shows no evidence of misuse or misappropriation of medications. She denies use of alcohol or illegal substances. UDS have been Appropriate with most recent screen     Controlled Substances Monitoring:     RX Monitoring 4/13/2022   Attestation -   Periodic Controlled Substance Monitoring No signs of potential drug abuse or diversion identified.;Obtaining appropriate analgesic effect of treatment. ;Random urine drug screen sent today.    Chronic Pain > 50 MEDD -   Chronic Pain > 80 MEDD -   Follow up 2 months      JACKLYN Hunter - CNP

## 2022-04-16 LAB
6-ACETYLMORPHINE, UR: NOT DETECTED
7-AMINOCLONAZEPAM, URINE: NOT DETECTED
ALPHA-OH-ALPRAZ, URINE: NOT DETECTED
ALPHA-OH-MIDAZOLAM, URINE: NOT DETECTED
ALPRAZOLAM, URINE: NOT DETECTED
AMPHETAMINES, URINE: NOT DETECTED
BARBITURATES, URINE: NOT DETECTED
BENZOYLECGONINE, UR: NOT DETECTED
BUPRENORPHINE URINE: NOT DETECTED
CARISOPRODOL, UR: NOT DETECTED
CLONAZEPAM, URINE: NOT DETECTED
CODEINE, URINE: NOT DETECTED
CREATININE URINE: 108.5 MG/DL (ref 20–400)
DIAZEPAM, URINE: NOT DETECTED
EER PAIN MGT DRUG PANEL, HIGH RES/EMIT U: NORMAL
ETHYL GLUCURONIDE UR: PRESENT
FENTANYL URINE: NOT DETECTED
GABAPENTIN: NOT DETECTED
HYDROCODONE, URINE: NOT DETECTED
HYDROMORPHONE, URINE: NOT DETECTED
LORAZEPAM, URINE: NOT DETECTED
MARIJUANA METAB, UR: NOT DETECTED
MDA, UR: NOT DETECTED
MDEA, EVE, UR: NOT DETECTED
MDMA URINE: NOT DETECTED
MEPERIDINE METAB, UR: NOT DETECTED
METHADONE, URINE: NOT DETECTED
METHAMPHETAMINE, URINE: NOT DETECTED
METHYLPHENIDATE: NOT DETECTED
MIDAZOLAM, URINE: NOT DETECTED
MORPHINE URINE: NOT DETECTED
NALOXONE URINE: NOT DETECTED
NORBUPRENORPHINE, URINE: NOT DETECTED
NORDIAZEPAM, URINE: NOT DETECTED
NORFENTANYL, URINE: NOT DETECTED
NORHYDROCODONE, URINE: NOT DETECTED
NOROXYCODONE, URINE: PRESENT
NOROXYMORPHONE, URINE: PRESENT
OXAZEPAM, URINE: NOT DETECTED
OXYCODONE URINE: PRESENT
OXYMORPHONE, URINE: PRESENT
PAIN MGT DRUG PANEL, HI RES, UR: NORMAL
PCP,URINE: NOT DETECTED
PHENTERMINE, UR: NOT DETECTED
PREGABALIN: NOT DETECTED
TAPENTADOL, URINE: NOT DETECTED
TAPENTADOL-O-SULFATE, URINE: NOT DETECTED
TEMAZEPAM, URINE: NOT DETECTED
TRAMADOL, URINE: NOT DETECTED
ZOLPIDEM METABOLITE (ZCA), URINE: NOT DETECTED
ZOLPIDEM, URINE: NOT DETECTED

## 2022-05-10 ENCOUNTER — TELEPHONE (OUTPATIENT)
Dept: PAIN MANAGEMENT | Age: 57
End: 2022-05-10

## 2022-05-10 DIAGNOSIS — M77.11 RIGHT LATERAL EPICONDYLITIS: ICD-10-CM

## 2022-05-10 NOTE — TELEPHONE ENCOUNTER
Patient called refill line for their   Percocet  mg 120 Tabs   Send to [place] pharmacy    Last Appt:  4/13/2022  Next Appt:   6/13/2022  Med verified in Epic    Last DS11: 4/13/2022    [place] for [place], and [place]:   Percocet  mg 120 Tabs   Last filled on 4/16/2022  Due on  5/13/2022

## 2022-05-10 NOTE — TELEPHONE ENCOUNTER
PA for Baclofen 10 mg started thru covermymeds. Awaiting a response. [employer] has not responded in 3-7 days or if you have any questions about your ePA request, please contact [employer] at [phone].

## 2022-05-11 RX ORDER — OXYCODONE AND ACETAMINOPHEN 10; 325 MG/1; MG/1
1 TABLET ORAL EVERY 6 HOURS PRN
Qty: 120 TABLET | Refills: 0 | Status: SHIPPED | OUTPATIENT
Start: 2022-05-13 | End: 2022-06-08 | Stop reason: SDUPTHER

## 2022-06-08 DIAGNOSIS — M77.11 RIGHT LATERAL EPICONDYLITIS: ICD-10-CM

## 2022-06-08 NOTE — TELEPHONE ENCOUNTER
Patient called refill line for their   Percocet  mg 120 Tabs   Send to [place] pharmacy     Last Appt:  4/13/2022  Next Appt:   6/13/2022  Med verified in Epic    Last DS11: 4/13/22    [place] checked for [place], and [place]:   Percocet  mg 120 Tabs   Last filled on 5/14/22  Due on 6/13/22

## 2022-06-10 RX ORDER — OXYCODONE AND ACETAMINOPHEN 10; 325 MG/1; MG/1
1 TABLET ORAL EVERY 6 HOURS PRN
Qty: 120 TABLET | Refills: 0 | Status: SHIPPED | OUTPATIENT
Start: 2022-06-13 | End: 2022-07-07 | Stop reason: SDUPTHER

## 2022-06-13 ENCOUNTER — OFFICE VISIT (OUTPATIENT)
Dept: PAIN MANAGEMENT | Age: 57
End: 2022-06-13
Payer: MEDICARE

## 2022-06-13 VITALS
HEART RATE: 76 BPM | DIASTOLIC BLOOD PRESSURE: 76 MMHG | BODY MASS INDEX: 27.39 KG/M2 | OXYGEN SATURATION: 96 % | SYSTOLIC BLOOD PRESSURE: 138 MMHG | WEIGHT: 154.6 LBS

## 2022-06-13 DIAGNOSIS — M79.18 MYOFASCIAL PAIN: ICD-10-CM

## 2022-06-13 DIAGNOSIS — G89.29 CHRONIC PAIN OF MULTIPLE JOINTS: ICD-10-CM

## 2022-06-13 DIAGNOSIS — M79.7 FIBROMYALGIA: Primary | ICD-10-CM

## 2022-06-13 DIAGNOSIS — M25.50 CHRONIC PAIN OF MULTIPLE JOINTS: ICD-10-CM

## 2022-06-13 PROCEDURE — 3017F COLORECTAL CA SCREEN DOC REV: CPT | Performed by: NURSE PRACTITIONER

## 2022-06-13 PROCEDURE — 99213 OFFICE O/P EST LOW 20 MIN: CPT | Performed by: NURSE PRACTITIONER

## 2022-06-13 PROCEDURE — G8419 CALC BMI OUT NRM PARAM NOF/U: HCPCS | Performed by: NURSE PRACTITIONER

## 2022-06-13 PROCEDURE — 99214 OFFICE O/P EST MOD 30 MIN: CPT | Performed by: NURSE PRACTITIONER

## 2022-06-13 PROCEDURE — 4004F PT TOBACCO SCREEN RCVD TLK: CPT | Performed by: NURSE PRACTITIONER

## 2022-06-13 PROCEDURE — G8427 DOCREV CUR MEDS BY ELIG CLIN: HCPCS | Performed by: NURSE PRACTITIONER

## 2022-06-13 ASSESSMENT — ENCOUNTER SYMPTOMS
RESPIRATORY NEGATIVE: 1
BACK PAIN: 1

## 2022-06-13 NOTE — PROGRESS NOTES
Subjective:      Patient ID: Shannon Morton is a 60 y.o. female. Chief Complaint   Patient presents with    Chronic Pain     2 mo follow up- pt reports slight improvement in pain      Neck Pain   Pertinent negatives include no numbness or weakness. Here today for routine pain clinic recheck, chronic myofascial pain, chronic joint pain. Pain Assessment  Location of Pain:  (generalized)  Location Modifiers: Left,Right,Anterior,Superior,Posterior,Inferior,Medial,Lateral  Severity of Pain: 7  Quality of Pain: Throbbing,Sharp,Dull,Aching,Popping,Cracking  Duration of Pain: Persistent  Frequency of Pain: Constant  Aggravating Factors: Bending,Stretching,Straightening,Kneeling,Exercise,Squatting,Standing,Walking,Stairs  Limiting Behavior: Yes  Relieving Factors: Heat,Ice (meds, TENS)  Result of Injury: No  Work-Related Injury: No    No Known Allergies    Outpatient Medications Marked as Taking for the 22 encounter (Office Visit) with JACKLYN Hatfield - CNP   Medication Sig Dispense Refill    oxyCODONE-acetaminophen (PERCOCET)  MG per tablet Take 1 tablet by mouth every 6 hours as needed for Pain for up to 30 days.  Intended supply: 30 days 120 tablet 0    baclofen (LIORESAL) 10 MG tablet Take 1 tablet by mouth 3 times daily 90 tablet 5    Elastic Bandages & Supports (ACE TENNIS ELBOW BRACE) MISC Right elbow daily 1 each 0    desvenlafaxine succinate (PRISTIQ) 100 MG TB24 extended release tablet Take 100 mg by mouth daily      busPIRone (BUSPAR) 10 MG tablet Take 15 mg by mouth in the morning, at noon, and at bedtime          Past Medical History:   Diagnosis Date    Anxiety     Arthritis     CMC (carpometacarpal joint) dislocation, left, sequela     DJD (degenerative joint disease) of cervical spine     Fibromyalgia        Past Surgical History:   Procedure Laterality Date    BREAST SURGERY  2002     SECTION      times 2    ENDOMETRIAL ABLATION  2012    HAND SURGERY Left 2017    SHOULDER SURGERY Right 2014       Family History   Problem Relation Age of Onset    Cancer Brother         lung       Social History     Socioeconomic History    Marital status:      Spouse name: None    Number of children: None    Years of education: None    Highest education level: None   Occupational History    None   Tobacco Use    Smoking status: Current Every Day Smoker     Packs/day: 1.00     Types: Cigarettes    Smokeless tobacco: Never Used    Tobacco comment: Will see PCP PRN cessation needs. Vaping Use    Vaping Use: Former   Substance and Sexual Activity    Alcohol use: Yes     Alcohol/week: 4.0 standard drinks     Types: 4 Shots of liquor per week    Drug use: No    Sexual activity: Yes     Partners: Male   Other Topics Concern    None   Social History Narrative    None     Social Determinants of Health     Financial Resource Strain:     Difficulty of Paying Living Expenses: Not on file   Food Insecurity:     Worried About Running Out of Food in the Last Year: Not on file    Booker of Food in the Last Year: Not on file   Transportation Needs:     Lack of Transportation (Medical): Not on file    Lack of Transportation (Non-Medical):  Not on file   Physical Activity:     Days of Exercise per Week: Not on file    Minutes of Exercise per Session: Not on file   Stress:     Feeling of Stress : Not on file   Social Connections:     Frequency of Communication with Friends and Family: Not on file    Frequency of Social Gatherings with Friends and Family: Not on file    Attends Mu-ism Services: Not on file    Active Member of Clubs or Organizations: Not on file    Attends Club or Organization Meetings: Not on file    Marital Status: Not on file   Intimate Partner Violence:     Fear of Current or Ex-Partner: Not on file    Emotionally Abused: Not on file    Physically Abused: Not on file    Sexually Abused: Not on file   Housing Stability:     Unable to Pay for Housing in the Last Year: Not on file    Number of Places Lived in the Last Year: Not on file    Unstable Housing in the Last Year: Not on file     Review of Systems   Constitutional: Positive for activity change (limited) and fatigue. Respiratory: Negative. Cardiovascular: Negative. Genitourinary: Negative. Musculoskeletal: Positive for back pain, myalgias and neck pain. Neurological: Negative for weakness and numbness. Psychiatric/Behavioral: Positive for sleep disturbance. History anxiety       Objective:   Physical Exam  Vitals reviewed. Constitutional:       General: She is not in acute distress. Appearance: She is well-developed. She is not ill-appearing, toxic-appearing or diaphoretic. Interventions: She is not intubated. HENT:      Head: Normocephalic and atraumatic. Cardiovascular:      Rate and Rhythm: Normal rate. Pulmonary:      Effort: Pulmonary effort is normal. No tachypnea, bradypnea, accessory muscle usage, prolonged expiration, respiratory distress or retractions. She is not intubated. Musculoskeletal:        Back:    Skin:     General: Skin is warm and dry. Capillary Refill: Capillary refill takes less than 2 seconds. Coloration: Skin is not pale. Nails: There is no clubbing. Neurological:      Mental Status: She is alert and oriented to person, place, and time. She is not disoriented. GCS: GCS eye subscore is 4. GCS verbal subscore is 5. GCS motor subscore is 6. Cranial Nerves: No cranial nerve deficit. Psychiatric:         Mood and Affect: Mood and affect normal.         Speech: She is communicative. Behavior: Behavior normal. Behavior is not agitated, aggressive or withdrawn. Behavior is cooperative. Cognition and Memory: Cognition and memory normal.         Judgment: Judgment normal. Judgment is not impulsive or inappropriate. Assessment:      1. Fibromyalgia    2. Myofascial pain    3.  Chronic pain of multiple joints          Plan:       Chronic pain diagnoses such as   1. Fibromyalgia    2. Myofascial pain    3. Chronic pain of multiple joints     controlled on current medication regime, wll continue current pain medications to improve quality of life and function. Consider aquatic therapy and dry needling    Nevin Meng is here for recheck/reevaluation of chronic pain. She is able to maintain daily activities with the use of current pain medications and is generally satisfied with level of analgesia. She shows no evidence of misuse or misappropriation of medications. She denies use of alcohol or illegal substances. UDS have been Appropriate with most recent screen     Controlled Substances Monitoring:     RX Monitoring 6/13/2022   Attestation -   Periodic Controlled Substance Monitoring No signs of potential drug abuse or diversion identified.;Obtaining appropriate analgesic effect of treatment.    Chronic Pain > 50 MEDD -   Chronic Pain > 80 MEDD -   Follow up 2 months      JACKLYN Felix - CNP

## 2022-07-07 DIAGNOSIS — M77.11 RIGHT LATERAL EPICONDYLITIS: ICD-10-CM

## 2022-07-07 NOTE — TELEPHONE ENCOUNTER
Lulu Escobar called requesting a refill of the below medication which has been pended for you:     Requested Prescriptions     Pending Prescriptions Disp Refills    oxyCODONE-acetaminophen (PERCOCET)  MG per tablet 120 tablet 0     Sig: Take 1 tablet by mouth every 6 hours as needed for Pain for up to 30 days. Intended supply: 30 days       Last Appointment Date: 6/13/2022  Next Appointment Date: 8/12/2022    No Known Allergies    Last DS11 4/13/22. Please review. O[place] checked for [place], and [place]:  Percocet 10/325 mg  6/13/22   #120  . Due 7/13/22.

## 2022-07-10 NOTE — Clinical Note
Subjective:      24 y.o. female presents to urgent care for headache that started Friday. There was no inciting event or trauma at that time.  The headache is constant, without any associated aura, noise/light sensitivity, or numbness/tingling.  No prior history of migraine.  Did not, strenuous exercise.  No current diagnosis of cancer, HIV, or Lyme disease.  She has been using Advil and Tylenol with only minimal relief in symptoms.    She does note associated body aches, vomiting, fever, diarrhea.  No sore throat or cough.  She denies any tobacco product use.  No history of asthma or COPD.  She is fully vaccinated against COVID.  No known sick contacts    She denies any other questions or concerns at this time.    Current problem list, medication, and past medical/surgical history were reviewed in Epic.    ROS  See HPI     Objective:      BP (!) 92/64 (BP Location: Right arm, Patient Position: Sitting, BP Cuff Size: Adult)   Pulse 88   Temp 36.6 °C (97.9 °F) (Temporal)   Resp 20   Ht 1.524 m (5')   Wt 63.5 kg (140 lb)   LMP 07/06/2022 (Exact Date)   SpO2 96%   BMI 27.34 kg/m²     Physical Exam  Constitutional:       General: She is not in acute distress.     Appearance: She is not diaphoretic.   HENT:      Right Ear: Tympanic membrane, ear canal and external ear normal.      Left Ear: Tympanic membrane, ear canal and external ear normal.      Nose:      Right Sinus: No maxillary sinus tenderness or frontal sinus tenderness.      Left Sinus: No maxillary sinus tenderness or frontal sinus tenderness.      Mouth/Throat:      Pharynx: No posterior oropharyngeal erythema.      Tonsils: No tonsillar exudate.   Eyes:      Extraocular Movements: Extraocular movements intact.      Pupils: Pupils are equal, round, and reactive to light.   Cardiovascular:      Rate and Rhythm: Normal rate and regular rhythm.      Heart sounds: Normal heart sounds.   Pulmonary:      Effort: Pulmonary effort is normal. No respiratory  3 week follow up distress.      Breath sounds: Normal breath sounds.   Neurological:      Mental Status: She is alert.      Comments: Cranial nerves II through XII grossly intact.  Normal gait.  Normal heel shin test.  Negative Romberg.   Psychiatric:         Mood and Affect: Affect normal.         Judgment: Judgment normal.       Assessment/Plan:     1. Nonintractable headache, unspecified chronicity pattern, unspecified headache type  Injection of Toradol was given in urgent urgent care with good relief in symptoms.  She was encouraged to avoid NSAIDs for the next 24 hours, Tylenol as needed.  Ensure she is staying well-hydrated.  - ketorolac (TORADOL) injection 30 mg    2. Encounter for patient concern about exposure to infectious organism  Testing performed for COVID-19. In the meantime patient was advised to isolate until COVID test results returned. I encouraged mask use, frequent handwashing, wiping down hard surfaces, etc. Tylenol and Ibuprofen were recommended for symptomatic relief. If positive they will be contacted by their local health department regarding return to work/school protocols. Patient is currently without indication of need for higher level of care. Patient/Guardian was given precautionary signs/symptoms that mandate immediate follow up and evaluation in the ED. The patient and/or guardian demonstrated a good understanding and agreed with the treatment plan.  - SARS-CoV-2 PCR (24 hour In-House): Collect NP swab in VTM; Future      Instructed to return to Urgent Care or nearest Emergency Department if symptoms fail to improve, for any change in condition, further concerns, or new concerning symptoms. Patient states understanding of the plan of care and discharge instructions.    Ashley Dobson M.D.

## 2022-07-11 RX ORDER — OXYCODONE AND ACETAMINOPHEN 10; 325 MG/1; MG/1
1 TABLET ORAL EVERY 6 HOURS PRN
Qty: 120 TABLET | Refills: 0 | Status: SHIPPED | OUTPATIENT
Start: 2022-07-13 | End: 2022-08-09 | Stop reason: SDUPTHER

## 2022-08-09 DIAGNOSIS — M77.11 RIGHT LATERAL EPICONDYLITIS: ICD-10-CM

## 2022-08-09 RX ORDER — OXYCODONE AND ACETAMINOPHEN 10; 325 MG/1; MG/1
1 TABLET ORAL EVERY 6 HOURS PRN
Qty: 120 TABLET | Refills: 0 | Status: SHIPPED | OUTPATIENT
Start: 2022-08-12 | End: 2022-09-08 | Stop reason: SDUPTHER

## 2022-08-09 NOTE — TELEPHONE ENCOUNTER
Patient called refill line for their   Percocet  mg 120 Tabs   Send to [place] pharmacy     Last Appt:  6/13/2022  Next Appt:   8/12/2022  Med verified in Epic    Last DS11: 4/13/22    OAR[place] Report checked for [place], [place], and [place]:  Percocet  mg 120 Tabs   Last filled on 7/13/22  Due on 8/12/22

## 2022-08-12 ENCOUNTER — OFFICE VISIT (OUTPATIENT)
Dept: PAIN MANAGEMENT | Age: 57
End: 2022-08-12
Payer: MEDICARE

## 2022-08-12 VITALS
DIASTOLIC BLOOD PRESSURE: 72 MMHG | HEART RATE: 89 BPM | SYSTOLIC BLOOD PRESSURE: 132 MMHG | BODY MASS INDEX: 27.24 KG/M2 | WEIGHT: 153.8 LBS | OXYGEN SATURATION: 98 %

## 2022-08-12 DIAGNOSIS — M54.2 CERVICALGIA: ICD-10-CM

## 2022-08-12 DIAGNOSIS — M79.18 MYOFASCIAL PAIN: ICD-10-CM

## 2022-08-12 DIAGNOSIS — G89.29 CHRONIC PAIN OF MULTIPLE JOINTS: ICD-10-CM

## 2022-08-12 DIAGNOSIS — M79.7 FIBROMYALGIA: Primary | ICD-10-CM

## 2022-08-12 DIAGNOSIS — M25.50 CHRONIC PAIN OF MULTIPLE JOINTS: ICD-10-CM

## 2022-08-12 PROCEDURE — G8419 CALC BMI OUT NRM PARAM NOF/U: HCPCS | Performed by: NURSE PRACTITIONER

## 2022-08-12 PROCEDURE — 4004F PT TOBACCO SCREEN RCVD TLK: CPT | Performed by: NURSE PRACTITIONER

## 2022-08-12 PROCEDURE — G8427 DOCREV CUR MEDS BY ELIG CLIN: HCPCS | Performed by: NURSE PRACTITIONER

## 2022-08-12 PROCEDURE — 99214 OFFICE O/P EST MOD 30 MIN: CPT | Performed by: NURSE PRACTITIONER

## 2022-08-12 PROCEDURE — 3017F COLORECTAL CA SCREEN DOC REV: CPT | Performed by: NURSE PRACTITIONER

## 2022-08-12 PROCEDURE — 99213 OFFICE O/P EST LOW 20 MIN: CPT | Performed by: NURSE PRACTITIONER

## 2022-08-12 ASSESSMENT — ENCOUNTER SYMPTOMS
BACK PAIN: 1
RESPIRATORY NEGATIVE: 1

## 2022-08-12 NOTE — PROGRESS NOTES
Subjective:      Patient ID: Kiera Armstrong is a 60 y.o. female. Chief Complaint   Patient presents with    Chronic Pain     Symptoms unchanged since last visit      Neck Pain   Pertinent negatives include no numbness or weakness. Here today for routine pain clinic recheck, chronic myofascial pain, chronic joint pain. Pain Assessment  Location of Pain:  (generalized)  Location Modifiers: Right, Left, Posterior, Anterior, Superior, Inferior, Medial, Dorsal, Lateral  Severity of Pain: 7  Quality of Pain: Throbbing, Sharp, Popping  Duration of Pain: Persistent  Frequency of Pain: Constant  Aggravating Factors:  (movement)  Limiting Behavior: Yes  Relieving Factors: Heat, Nsaids (precription meds)  Result of Injury: No  Work-Related Injury: No  Are there other pain locations you wish to document?: No    No Known Allergies    Outpatient Medications Marked as Taking for the 22 encounter (Office Visit) with JACKLYN Loco CNP   Medication Sig Dispense Refill    oxyCODONE-acetaminophen (PERCOCET)  MG per tablet Take 1 tablet by mouth every 6 hours as needed for Pain for up to 30 days.  Intended supply: 30 days 120 tablet 0    baclofen (LIORESAL) 10 MG tablet Take 1 tablet by mouth 3 times daily 90 tablet 5    Elastic Bandages & Supports (ACE TENNIS ELBOW BRACE) MISC Right elbow daily 1 each 0    desvenlafaxine succinate (PRISTIQ) 100 MG TB24 extended release tablet Take 100 mg by mouth daily      busPIRone (BUSPAR) 10 MG tablet Take 15 mg by mouth in the morning, at noon, and at bedtime          Past Medical History:   Diagnosis Date    Anxiety     Arthritis     CMC (carpometacarpal joint) dislocation, left, sequela     DJD (degenerative joint disease) of cervical spine     Fibromyalgia        Past Surgical History:   Procedure Laterality Date    BREAST SURGERY  2002     SECTION      times 2    ENDOMETRIAL ABLATION  2012    HAND SURGERY Left 2017    SHOULDER SURGERY Right 2014 Family History   Problem Relation Age of Onset    Cancer Brother         lung       Social History     Socioeconomic History    Marital status:      Spouse name: None    Number of children: None    Years of education: None    Highest education level: None   Tobacco Use    Smoking status: Every Day     Packs/day: 1.00     Types: Cigarettes    Smokeless tobacco: Never    Tobacco comments: Will see PCP PRN cessation needs. Vaping Use    Vaping Use: Former   Substance and Sexual Activity    Alcohol use: Yes     Alcohol/week: 4.0 standard drinks     Types: 4 Shots of liquor per week    Drug use: No    Sexual activity: Yes     Partners: Male     Review of Systems   Constitutional:  Positive for activity change (limited) and fatigue. Respiratory: Negative. Cardiovascular: Negative. Genitourinary: Negative. Musculoskeletal:  Positive for back pain, myalgias and neck pain. Neurological:  Negative for weakness and numbness. Psychiatric/Behavioral:  Positive for sleep disturbance. History anxiety     Objective:   Physical Exam  Vitals reviewed. Constitutional:       General: She is not in acute distress. Appearance: She is well-developed. She is not ill-appearing, toxic-appearing or diaphoretic. Interventions: She is not intubated. HENT:      Head: Normocephalic and atraumatic. Cardiovascular:      Rate and Rhythm: Normal rate. Pulmonary:      Effort: Pulmonary effort is normal. No tachypnea, bradypnea, accessory muscle usage, prolonged expiration, respiratory distress or retractions. She is not intubated. Musculoskeletal:        Back:    Skin:     General: Skin is warm and dry. Capillary Refill: Capillary refill takes less than 2 seconds. Coloration: Skin is not pale. Nails: There is no clubbing. Neurological:      Mental Status: She is alert and oriented to person, place, and time. She is not disoriented. GCS: GCS eye subscore is 4.  GCS verbal subscore is 5. GCS motor subscore is 6. Cranial Nerves: No cranial nerve deficit. Psychiatric:         Mood and Affect: Mood and affect normal.         Speech: She is communicative. Behavior: Behavior normal. Behavior is not agitated, aggressive or withdrawn. Behavior is cooperative. Cognition and Memory: Cognition and memory normal.         Judgment: Judgment normal. Judgment is not impulsive or inappropriate. Assessment:      1. Fibromyalgia    2. Myofascial pain    3. Chronic pain of multiple joints    4. Cervicalgia          Plan:       Chronic pain diagnoses such as   1. Fibromyalgia    2. Myofascial pain    3. Chronic pain of multiple joints    4. Cervicalgia     controlled on current medication regime, wll continue current pain medications to improve quality of life and function. Consider aquatic therapy and dry needling    Alayna Lynne is here for recheck/reevaluation of chronic pain. She is able to maintain daily activities with the use of current pain medications and is generally satisfied with level of analgesia. She shows no evidence of misuse or misappropriation of medications. She denies use of alcohol or illegal substances. UDS have been Appropriate with most recent screen     Controlled Substances Monitoring:     RX Monitoring 8/12/2022   Attestation -   Periodic Controlled Substance Monitoring No signs of potential drug abuse or diversion identified.;Obtaining appropriate analgesic effect of treatment.    Chronic Pain > 50 MEDD -   Chronic Pain > 80 MEDD -     Follow up 2 months      Demian Cornea, APRN - CNP

## 2022-09-07 DIAGNOSIS — M77.11 RIGHT LATERAL EPICONDYLITIS: ICD-10-CM

## 2022-09-07 NOTE — TELEPHONE ENCOUNTER
OAR[place] Report checked for [place], and [place]: 8/12/22 percocet 10-325mg #120. Due 9/11/22. Pended for 9/10/22 due to pharmacy hours.      Last Appt:  8/12/2022  Next Appt:   10/10/2022  Med verified in [address]

## 2022-09-08 RX ORDER — OXYCODONE AND ACETAMINOPHEN 10; 325 MG/1; MG/1
1 TABLET ORAL EVERY 6 HOURS PRN
Qty: 120 TABLET | Refills: 0 | Status: SHIPPED | OUTPATIENT
Start: 2022-09-10 | End: 2022-10-07 | Stop reason: SDUPTHER

## 2022-10-05 DIAGNOSIS — M77.11 RIGHT LATERAL EPICONDYLITIS: ICD-10-CM

## 2022-10-07 RX ORDER — OXYCODONE AND ACETAMINOPHEN 10; 325 MG/1; MG/1
1 TABLET ORAL EVERY 6 HOURS PRN
Qty: 120 TABLET | Refills: 0 | Status: SHIPPED | OUTPATIENT
Start: 2022-10-10 | End: 2022-11-09

## 2022-10-07 RX ORDER — BACLOFEN 10 MG/1
10 TABLET ORAL 3 TIMES DAILY
Qty: 270 TABLET | Refills: 3 | Status: SHIPPED | OUTPATIENT
Start: 2022-10-07

## 2022-10-10 ENCOUNTER — OFFICE VISIT (OUTPATIENT)
Dept: PAIN MANAGEMENT | Age: 57
End: 2022-10-10
Payer: MEDICARE

## 2022-10-10 ENCOUNTER — HOSPITAL ENCOUNTER (OUTPATIENT)
Age: 57
Setting detail: SPECIMEN
Discharge: HOME | End: 2022-10-10
Payer: MEDICARE

## 2022-10-10 VITALS
DIASTOLIC BLOOD PRESSURE: 84 MMHG | HEIGHT: 63 IN | OXYGEN SATURATION: 97 % | WEIGHT: 152 LBS | HEART RATE: 67 BPM | SYSTOLIC BLOOD PRESSURE: 130 MMHG | BODY MASS INDEX: 26.93 KG/M2

## 2022-10-10 DIAGNOSIS — M54.2 CERVICALGIA: ICD-10-CM

## 2022-10-10 DIAGNOSIS — G89.29 CHRONIC PAIN OF MULTIPLE JOINTS: ICD-10-CM

## 2022-10-10 DIAGNOSIS — Z02.83 ENCOUNTER FOR DRUG SCREENING: ICD-10-CM

## 2022-10-10 DIAGNOSIS — M25.50 CHRONIC PAIN OF MULTIPLE JOINTS: ICD-10-CM

## 2022-10-10 DIAGNOSIS — Z79.891 ENCOUNTER FOR LONG-TERM OPIATE ANALGESIC USE: ICD-10-CM

## 2022-10-10 DIAGNOSIS — M79.18 MYOFASCIAL PAIN: ICD-10-CM

## 2022-10-10 DIAGNOSIS — M79.7 FIBROMYALGIA: ICD-10-CM

## 2022-10-10 DIAGNOSIS — Z79.891 ENCOUNTER FOR LONG-TERM OPIATE ANALGESIC USE: Primary | ICD-10-CM

## 2022-10-10 PROCEDURE — 99213 OFFICE O/P EST LOW 20 MIN: CPT | Performed by: NURSE PRACTITIONER

## 2022-10-10 PROCEDURE — 80307 DRUG TEST PRSMV CHEM ANLYZR: CPT

## 2022-10-10 PROCEDURE — G8419 CALC BMI OUT NRM PARAM NOF/U: HCPCS | Performed by: NURSE PRACTITIONER

## 2022-10-10 PROCEDURE — G0481 DRUG TEST DEF 8-14 CLASSES: HCPCS

## 2022-10-10 PROCEDURE — G8484 FLU IMMUNIZE NO ADMIN: HCPCS | Performed by: NURSE PRACTITIONER

## 2022-10-10 PROCEDURE — G8427 DOCREV CUR MEDS BY ELIG CLIN: HCPCS | Performed by: NURSE PRACTITIONER

## 2022-10-10 PROCEDURE — 3017F COLORECTAL CA SCREEN DOC REV: CPT | Performed by: NURSE PRACTITIONER

## 2022-10-10 PROCEDURE — 99214 OFFICE O/P EST MOD 30 MIN: CPT | Performed by: NURSE PRACTITIONER

## 2022-10-10 PROCEDURE — 4004F PT TOBACCO SCREEN RCVD TLK: CPT | Performed by: NURSE PRACTITIONER

## 2022-10-10 ASSESSMENT — ENCOUNTER SYMPTOMS
RESPIRATORY NEGATIVE: 1
BACK PAIN: 1

## 2022-10-10 NOTE — PROGRESS NOTES
Subjective:      Patient ID: Negrita Casanova is a 60 y.o. female. Chief Complaint   Patient presents with    Pain     fibromyalgia     Neck Pain   Pertinent negatives include no numbness or weakness. Pain  Associated symptoms include fatigue, myalgias and neck pain. Pertinent negatives include no numbness or weakness. Here today for routine pain clinic recheck, chronic myofascial pain, chronic joint pain. Routine chiropractic care    Pain Assessment  Location of Pain:  (all over - fibromyalgia)  Severity of Pain: 7  Quality of Pain: Throbbing, Sharp, Dull, Aching  Duration of Pain: Persistent  Frequency of Pain: Constant  Aggravating Factors: Stairs, Walking, Standing, Squatting, Kneeling, Exercise, Straightening, Stretching, Bending  Limiting Behavior: Yes  Relieving Factors: Rest, Ice, Heat (meds)  Result of Injury: No  Work-Related Injury: No    No Known Allergies    Outpatient Medications Marked as Taking for the 10/10/22 encounter (Office Visit) with AJCKLYN Fragoso CNP   Medication Sig Dispense Refill    oxyCODONE-acetaminophen (PERCOCET)  MG per tablet Take 1 tablet by mouth every 6 hours as needed for Pain for up to 30 days.  Intended supply: 30 days 120 tablet 0    baclofen (LIORESAL) 10 MG tablet Take 1 tablet by mouth 3 times daily 270 tablet 3    Elastic Bandages & Supports (ACE TENNIS ELBOW BRACE) MISC Right elbow daily 1 each 0    diclofenac sodium (VOLTAREN) 1 % GEL Apply 4 g topically 4 times daily 350 g 5    desvenlafaxine succinate (PRISTIQ) 100 MG TB24 extended release tablet Take 100 mg by mouth daily      busPIRone (BUSPAR) 10 MG tablet Take 15 mg by mouth in the morning, at noon, and at bedtime          Past Medical History:   Diagnosis Date    Anxiety     Arthritis     CMC (carpometacarpal joint) dislocation, left, sequela     DJD (degenerative joint disease) of cervical spine     Fibromyalgia        Past Surgical History:   Procedure Laterality Date    BREAST SURGERY 2002     SECTION      times 2    ENDOMETRIAL ABLATION  2012    HAND SURGERY Left 2017    SHOULDER SURGERY Right 2014       Family History   Problem Relation Age of Onset    Cancer Brother         lung       Social History     Socioeconomic History    Marital status:      Spouse name: None    Number of children: None    Years of education: None    Highest education level: None   Tobacco Use    Smoking status: Every Day     Packs/day: 1.00     Types: Cigarettes    Smokeless tobacco: Never    Tobacco comments: Will see PCP PRN cessation needs. Vaping Use    Vaping Use: Former   Substance and Sexual Activity    Alcohol use: Yes     Alcohol/week: 4.0 standard drinks     Types: 4 Shots of liquor per week    Drug use: No    Sexual activity: Yes     Partners: Male     Review of Systems   Constitutional:  Positive for activity change (limited) and fatigue. Respiratory: Negative. Cardiovascular: Negative. Genitourinary: Negative. Musculoskeletal:  Positive for back pain, myalgias and neck pain. Neurological:  Negative for weakness and numbness. Psychiatric/Behavioral:  Positive for sleep disturbance. History anxiety     Objective:   Physical Exam  Vitals reviewed. Constitutional:       General: She is not in acute distress. Appearance: She is well-developed. She is not ill-appearing, toxic-appearing or diaphoretic. Interventions: She is not intubated. HENT:      Head: Normocephalic and atraumatic. Cardiovascular:      Rate and Rhythm: Normal rate. Pulmonary:      Effort: Pulmonary effort is normal. No tachypnea, bradypnea, accessory muscle usage, prolonged expiration, respiratory distress or retractions. She is not intubated. Musculoskeletal:        Back:    Skin:     General: Skin is warm and dry. Capillary Refill: Capillary refill takes less than 2 seconds. Coloration: Skin is not pale. Nails: There is no clubbing.    Neurological:      Mental Status: She is alert and oriented to person, place, and time. She is not disoriented. GCS: GCS eye subscore is 4. GCS verbal subscore is 5. GCS motor subscore is 6. Cranial Nerves: No cranial nerve deficit. Psychiatric:         Mood and Affect: Mood and affect normal.         Speech: She is communicative. Behavior: Behavior normal. Behavior is not agitated, aggressive or withdrawn. Behavior is cooperative. Cognition and Memory: Cognition and memory normal.         Judgment: Judgment normal. Judgment is not impulsive or inappropriate. Assessment:      1. Encounter for long-term opiate analgesic use    2. Encounter for drug screening    3. Fibromyalgia    4. Myofascial pain    5. Chronic pain of multiple joints    6. Cervicalgia          Plan:       Chronic pain diagnoses such as   1. Encounter for long-term opiate analgesic use    2. Encounter for drug screening    3. Fibromyalgia    4. Myofascial pain    5. Chronic pain of multiple joints    6. Cervicalgia     controlled on current medication regime, wll continue current pain medications to improve quality of life and function. Consider aquatic therapy and dry needling    Random drug screen today for opiate medication compliance. Aaron Emmanuel is here for recheck/reevaluation of chronic pain. She is able to maintain daily activities with the use of current pain medications and is generally satisfied with level of analgesia. She shows no evidence of misuse or misappropriation of medications. She denies use of alcohol or illegal substances. UDS have been Appropriate with most recent screen     Controlled Substances Monitoring:     RX Monitoring 10/7/2022   Attestation -   Periodic Controlled Substance Monitoring Possible medication side effects, risk of tolerance/dependence & alternative treatments discussed. ;No signs of potential drug abuse or diversion identified.    Chronic Pain > 50 MEDD Re-evaluated the status of the patient's underlying condition causing pain.;Obtained or confirmed \"Consent for Opioid Use\" on file.    Chronic Pain > 80 MEDD -     Follow up 2 months      Juanito Lizarraga, JACKLYN - CNP

## 2022-10-15 LAB
6-ACETYLMORPHINE, UR: NOT DETECTED
7-AMINOCLONAZEPAM, URINE: NOT DETECTED
ALPHA-OH-ALPRAZ, URINE: NOT DETECTED
ALPHA-OH-MIDAZOLAM, URINE: NOT DETECTED
ALPRAZOLAM, URINE: NOT DETECTED
AMPHETAMINES, URINE: NOT DETECTED
BARBITURATES, URINE: NOT DETECTED
BENZOYLECGONINE, UR: NOT DETECTED
BUPRENORPHINE URINE: NOT DETECTED
CARISOPRODOL, UR: NOT DETECTED
CLONAZEPAM, URINE: NOT DETECTED
CODEINE, URINE: NOT DETECTED
CREATININE URINE: 175.6 MG/DL (ref 20–400)
DIAZEPAM, URINE: NOT DETECTED
EER PAIN MGT DRUG PANEL, HIGH RES/EMIT U: NORMAL
ETHYL GLUCURONIDE UR: PRESENT
FENTANYL URINE: NOT DETECTED
GABAPENTIN: NOT DETECTED
HYDROCODONE, URINE: NOT DETECTED
HYDROMORPHONE, URINE: NOT DETECTED
LORAZEPAM, URINE: NOT DETECTED
MARIJUANA METAB, UR: NOT DETECTED
MDA, UR: NOT DETECTED
MDEA, EVE, UR: NOT DETECTED
MDMA URINE: NOT DETECTED
MEPERIDINE METAB, UR: NOT DETECTED
METHADONE, URINE: NOT DETECTED
METHAMPHETAMINE, URINE: NOT DETECTED
METHYLPHENIDATE: NOT DETECTED
MIDAZOLAM, URINE: NOT DETECTED
MORPHINE URINE: NOT DETECTED
NALOXONE URINE: NOT DETECTED
NORBUPRENORPHINE, URINE: NOT DETECTED
NORDIAZEPAM, URINE: NOT DETECTED
NORFENTANYL, URINE: NOT DETECTED
NORHYDROCODONE, URINE: NOT DETECTED
NOROXYCODONE, URINE: PRESENT
NOROXYMORPHONE, URINE: PRESENT
OXAZEPAM, URINE: NOT DETECTED
OXYCODONE URINE: PRESENT
OXYMORPHONE, URINE: PRESENT
PAIN MGT DRUG PANEL, HI RES, UR: NORMAL
PCP,URINE: NOT DETECTED
PHENTERMINE, UR: NOT DETECTED
PREGABALIN: NOT DETECTED
TAPENTADOL, URINE: NOT DETECTED
TAPENTADOL-O-SULFATE, URINE: NOT DETECTED
TEMAZEPAM, URINE: NOT DETECTED
TRAMADOL, URINE: NOT DETECTED
ZOLPIDEM METABOLITE (ZCA), URINE: NOT DETECTED
ZOLPIDEM, URINE: NOT DETECTED

## 2022-11-04 DIAGNOSIS — M77.11 RIGHT LATERAL EPICONDYLITIS: ICD-10-CM

## 2022-11-08 RX ORDER — OXYCODONE AND ACETAMINOPHEN 10; 325 MG/1; MG/1
1 TABLET ORAL EVERY 6 HOURS PRN
Qty: 120 TABLET | Refills: 0 | Status: SHIPPED | OUTPATIENT
Start: 2022-11-09 | End: 2022-12-09

## 2022-12-06 DIAGNOSIS — M77.11 RIGHT LATERAL EPICONDYLITIS: ICD-10-CM

## 2022-12-06 RX ORDER — OXYCODONE AND ACETAMINOPHEN 10; 325 MG/1; MG/1
1 TABLET ORAL EVERY 6 HOURS PRN
Qty: 120 TABLET | Refills: 0 | Status: SHIPPED | OUTPATIENT
Start: 2022-12-09 | End: 2023-01-08

## 2022-12-06 NOTE — TELEPHONE ENCOUNTER
Patient called refill line for their    Percocet  mg   [place] Appt:  10/10/2022  Next Appt:   12/16/2022  Med verified in [address] checked for [place], [place], and [place]:    Percocet  mg   Last filled 11/9/22  Due 12/9/22

## 2022-12-16 ENCOUNTER — NURSE ONLY (OUTPATIENT)
Dept: LAB | Age: 57
End: 2022-12-16
Payer: MEDICARE

## 2022-12-16 ENCOUNTER — OFFICE VISIT (OUTPATIENT)
Dept: PAIN MANAGEMENT | Age: 57
End: 2022-12-16
Payer: MEDICARE

## 2022-12-16 VITALS
SYSTOLIC BLOOD PRESSURE: 164 MMHG | DIASTOLIC BLOOD PRESSURE: 110 MMHG | BODY MASS INDEX: 26.67 KG/M2 | HEIGHT: 63 IN | RESPIRATION RATE: 17 BRPM | HEART RATE: 88 BPM | OXYGEN SATURATION: 96 % | WEIGHT: 150.5 LBS

## 2022-12-16 DIAGNOSIS — M54.2 CERVICALGIA: ICD-10-CM

## 2022-12-16 DIAGNOSIS — G89.29 CHRONIC PAIN OF MULTIPLE JOINTS: ICD-10-CM

## 2022-12-16 DIAGNOSIS — M79.7 FIBROMYALGIA: Primary | ICD-10-CM

## 2022-12-16 DIAGNOSIS — M25.50 CHRONIC PAIN OF MULTIPLE JOINTS: ICD-10-CM

## 2022-12-16 DIAGNOSIS — M79.18 MYOFASCIAL PAIN: ICD-10-CM

## 2022-12-16 PROCEDURE — 99213 OFFICE O/P EST LOW 20 MIN: CPT | Performed by: NURSE PRACTITIONER

## 2022-12-16 RX ORDER — TRIAMCINOLONE ACETONIDE 40 MG/ML
40 INJECTION, SUSPENSION INTRA-ARTICULAR; INTRAMUSCULAR ONCE
Status: COMPLETED | OUTPATIENT
Start: 2022-12-16 | End: 2022-12-16

## 2022-12-16 RX ADMIN — TRIAMCINOLONE ACETONIDE 40 MG: 40 INJECTION, SUSPENSION INTRA-ARTICULAR; INTRAMUSCULAR at 14:43

## 2022-12-16 ASSESSMENT — ENCOUNTER SYMPTOMS
BACK PAIN: 1
RESPIRATORY NEGATIVE: 1

## 2022-12-16 NOTE — PROGRESS NOTES
Subjective:      Patient ID: Nathaneil Nissen is a 59 y.o. female. Chief Complaint   Patient presents with    Pain     All over     Neck Pain   Pertinent negatives include no numbness or weakness. Pain  Associated symptoms include fatigue, myalgias and neck pain. Pertinent negatives include no numbness or weakness. Here today for routine pain clinic recheck, chronic myofascial pain, chronic joint pain. Routine chiropractic care    Pain Assessment  Location of Pain: Other (Comment) (all over)  Location Modifiers: Other (Comment) (all over)  Severity of Pain: 7  Quality of Pain: Throbbing, Sharp, Dull, Aching  Duration of Pain: Persistent  Frequency of Pain: Constant  Aggravating Factors:  (weather repetition)  Limiting Behavior: Yes  Relieving Factors: Rest, Heat (meds)  Result of Injury: No  Work-Related Injury: No  Are there other pain locations you wish to document?: No    No Known Allergies    Outpatient Medications Marked as Taking for the 12/16/22 encounter (Office Visit) with JACKLYN Wynne CNP   Medication Sig Dispense Refill    oxyCODONE-acetaminophen (PERCOCET)  MG per tablet Take 1 tablet by mouth every 6 hours as needed for Pain for up to 30 days.  Intended supply: 30 days 120 tablet 0    baclofen (LIORESAL) 10 MG tablet Take 1 tablet by mouth 3 times daily 270 tablet 3    Elastic Bandages & Supports (ACE TENNIS ELBOW BRACE) MISC Right elbow daily 1 each 0    diclofenac sodium (VOLTAREN) 1 % GEL Apply 4 g topically 4 times daily 350 g 5    desvenlafaxine succinate (PRISTIQ) 100 MG TB24 extended release tablet Take 100 mg by mouth daily      busPIRone (BUSPAR) 10 MG tablet Take 15 mg by mouth in the morning, at noon, and at bedtime          Past Medical History:   Diagnosis Date    Anxiety     Arthritis     CMC (carpometacarpal joint) dislocation, left, sequela     DJD (degenerative joint disease) of cervical spine     Fibromyalgia        Past Surgical History:   Procedure Laterality Date    BREAST SURGERY  2002     SECTION      times 2    ENDOMETRIAL ABLATION  2012    HAND SURGERY Left 2017    SHOULDER SURGERY Right 2014       Family History   Problem Relation Age of Onset    Cancer Brother         lung       Social History     Socioeconomic History    Marital status:      Spouse name: None    Number of children: None    Years of education: None    Highest education level: None   Tobacco Use    Smoking status: Every Day     Packs/day: 1.00     Types: Cigarettes    Smokeless tobacco: Never    Tobacco comments: Will see PCP PRN cessation needs. Vaping Use    Vaping Use: Former   Substance and Sexual Activity    Alcohol use: Yes     Alcohol/week: 4.0 standard drinks     Types: 4 Shots of liquor per week    Drug use: No    Sexual activity: Yes     Partners: Male     Review of Systems   Constitutional:  Positive for activity change (limited) and fatigue. Respiratory: Negative. Cardiovascular: Negative. Genitourinary: Negative. Musculoskeletal:  Positive for back pain, myalgias and neck pain. Neurological:  Negative for weakness and numbness. Psychiatric/Behavioral:  Positive for sleep disturbance. History anxiety     Objective:   Physical Exam  Vitals reviewed. Constitutional:       General: She is not in acute distress. Appearance: She is well-developed. She is not ill-appearing, toxic-appearing or diaphoretic. Interventions: She is not intubated. HENT:      Head: Normocephalic and atraumatic. Cardiovascular:      Rate and Rhythm: Normal rate. Pulmonary:      Effort: Pulmonary effort is normal. No tachypnea, bradypnea, accessory muscle usage, prolonged expiration, respiratory distress or retractions. She is not intubated. Musculoskeletal:        Back:    Skin:     General: Skin is warm and dry. Capillary Refill: Capillary refill takes less than 2 seconds. Coloration: Skin is not pale. Nails:  There is no clubbing. Neurological:      Mental Status: She is alert and oriented to person, place, and time. She is not disoriented. GCS: GCS eye subscore is 4. GCS verbal subscore is 5. GCS motor subscore is 6. Cranial Nerves: No cranial nerve deficit. Psychiatric:         Mood and Affect: Mood and affect normal.         Speech: She is communicative. Behavior: Behavior normal. Behavior is not agitated, aggressive or withdrawn. Behavior is cooperative. Cognition and Memory: Cognition and memory normal.         Judgment: Judgment normal. Judgment is not impulsive or inappropriate. Assessment:      1. Fibromyalgia    2. Myofascial pain    3. Chronic pain of multiple joints    4. Cervicalgia            Plan:       Chronic pain diagnoses such as   1. Fibromyalgia    2. Myofascial pain    3. Chronic pain of multiple joints    4. Cervicalgia       controlled on current medication regime, wll continue current pain medications to improve quality of life and function. Cem Medina is here for recheck/reevaluation of chronic pain. She is able to maintain daily activities with the use of current pain medications and is generally satisfied with level of analgesia. She shows no evidence of misuse or misappropriation of medications. She denies use of alcohol or illegal substances. UDS have been Appropriate with most recent screen     Controlled Substances Monitoring:     RX Monitoring 12/16/2022   Attestation -   Periodic Controlled Substance Monitoring No signs of potential drug abuse or diversion identified.;Obtaining appropriate analgesic effect of treatment.    Chronic Pain > 50 MEDD -   Chronic Pain > 80 MEDD -     Follow up 2 months      JACKLYN Dailey - CNP

## 2023-01-04 DIAGNOSIS — M77.11 RIGHT LATERAL EPICONDYLITIS: ICD-10-CM

## 2023-01-04 NOTE — TELEPHONE ENCOUNTER
OARRS Report checked for [place], [place], and [place]: 12/9/22 percocet 10-325mg #120. Due 1/8/23. Pended for 1/7/23 due to pharmacy hours.      Last Appt:  12/16/2022  Next Appt:   2/15/2023  Med verified in [address]

## 2023-01-06 RX ORDER — OXYCODONE AND ACETAMINOPHEN 10; 325 MG/1; MG/1
1 TABLET ORAL EVERY 6 HOURS PRN
Qty: 120 TABLET | Refills: 0 | Status: SHIPPED | OUTPATIENT
Start: 2023-01-07 | End: 2023-02-06

## 2023-02-02 DIAGNOSIS — M77.11 RIGHT LATERAL EPICONDYLITIS: ICD-10-CM

## 2023-02-02 NOTE — TELEPHONE ENCOUNTER
Miranda Hobbs called requesting a refill of the below medication which has been pended for you:     Requested Prescriptions     Pending Prescriptions Disp Refills    oxyCODONE-acetaminophen (PERCOCET)  MG per tablet 120 tablet 0     Sig: Take 1 tablet by mouth every 6 hours as needed for Pain for up to 30 days. Intended supply: 30 days       Last Appointment Date: 12/16/22  Next Appointment Date: 02/15/23    No Known Allergies    Pharmacy:  [address] [place] [address] Report checked for [place], [place], and [place]:Percocet 10/325mg 01/07/23 #120. Due 02/06/23.

## 2023-02-05 RX ORDER — OXYCODONE AND ACETAMINOPHEN 10; 325 MG/1; MG/1
1 TABLET ORAL EVERY 6 HOURS PRN
Qty: 120 TABLET | Refills: 0 | Status: SHIPPED | OUTPATIENT
Start: 2023-02-06 | End: 2023-03-08

## 2023-02-15 ENCOUNTER — OFFICE VISIT (OUTPATIENT)
Dept: PAIN MANAGEMENT | Age: 58
End: 2023-02-15
Payer: MEDICARE

## 2023-02-15 VITALS
DIASTOLIC BLOOD PRESSURE: 82 MMHG | OXYGEN SATURATION: 96 % | RESPIRATION RATE: 17 BRPM | WEIGHT: 150 LBS | HEIGHT: 63 IN | HEART RATE: 89 BPM | SYSTOLIC BLOOD PRESSURE: 138 MMHG | BODY MASS INDEX: 26.58 KG/M2

## 2023-02-15 DIAGNOSIS — G89.29 CHRONIC PAIN OF MULTIPLE JOINTS: Primary | ICD-10-CM

## 2023-02-15 DIAGNOSIS — M25.50 CHRONIC PAIN OF MULTIPLE JOINTS: Primary | ICD-10-CM

## 2023-02-15 DIAGNOSIS — M54.2 CERVICALGIA: ICD-10-CM

## 2023-02-15 DIAGNOSIS — M79.18 MYOFASCIAL PAIN: ICD-10-CM

## 2023-02-15 DIAGNOSIS — M79.7 FIBROMYALGIA: ICD-10-CM

## 2023-02-15 PROCEDURE — 99213 OFFICE O/P EST LOW 20 MIN: CPT | Performed by: NURSE PRACTITIONER

## 2023-02-15 RX ORDER — IBUPROFEN 600 MG/1
600 TABLET ORAL 2 TIMES DAILY
COMMUNITY

## 2023-02-15 ASSESSMENT — ENCOUNTER SYMPTOMS
BACK PAIN: 1
RESPIRATORY NEGATIVE: 1

## 2023-02-15 NOTE — PROGRESS NOTES
Subjective:      Patient ID: Cindy Morgan is a 59 y.o. female. Chief Complaint   Patient presents with    Pain     fibromyalgia     Neck Pain   Pertinent negatives include no numbness or weakness. Pain  Associated symptoms include fatigue, myalgias and neck pain. Pertinent negatives include no numbness or weakness. Here today for routine pain clinic recheck, chronic myofascial pain, chronic joint pain. Routine chiropractic care    Pain Assessment  Location of Pain: Other (Comment) (all over)  Location Modifiers: Other (Comment) (all over)  Severity of Pain: 7  Quality of Pain: Throbbing, Sharp, Dull, Aching  Duration of Pain: Persistent  Frequency of Pain: Constant  Aggravating Factors: Bending (repetition weather)  Limiting Behavior: Yes  Relieving Factors: Ice, Heat (meds)  Result of Injury: No  Work-Related Injury: No  Are there other pain locations you wish to document?: No    No Known Allergies    Outpatient Medications Marked as Taking for the 2/15/23 encounter (Office Visit) with JACKLYN Rodriguez CNP   Medication Sig Dispense Refill    ibuprofen (ADVIL;MOTRIN) 600 MG tablet Take 600 mg by mouth 2 times daily      oxyCODONE-acetaminophen (PERCOCET)  MG per tablet Take 1 tablet by mouth every 6 hours as needed for Pain for up to 30 days.  Intended supply: 30 days 120 tablet 0    baclofen (LIORESAL) 10 MG tablet Take 1 tablet by mouth 3 times daily 270 tablet 3    Elastic Bandages & Supports (ACE TENNIS ELBOW BRACE) MISC Right elbow daily 1 each 0    diclofenac sodium (VOLTAREN) 1 % GEL Apply 4 g topically 4 times daily 350 g 5    desvenlafaxine succinate (PRISTIQ) 100 MG TB24 extended release tablet Take 100 mg by mouth daily      busPIRone (BUSPAR) 10 MG tablet Take 15 mg by mouth in the morning, at noon, and at bedtime          Past Medical History:   Diagnosis Date    Anxiety     Arthritis     CMC (carpometacarpal joint) dislocation, left, sequela     DJD (degenerative joint disease) of cervical spine     Fibromyalgia        Past Surgical History:   Procedure Laterality Date    BREAST SURGERY  2002     SECTION      times 2    ENDOMETRIAL ABLATION  2012    HAND SURGERY Left 2017    SHOULDER SURGERY Right 2014       Family History   Problem Relation Age of Onset    Cancer Brother         lung       Social History     Socioeconomic History    Marital status:      Spouse name: None    Number of children: None    Years of education: None    Highest education level: None   Tobacco Use    Smoking status: Every Day     Packs/day: 1.00     Types: Cigarettes    Smokeless tobacco: Never    Tobacco comments: Will see PCP PRN cessation needs. Vaping Use    Vaping Use: Former   Substance and Sexual Activity    Alcohol use: Yes     Alcohol/week: 4.0 standard drinks     Types: 4 Shots of liquor per week    Drug use: No    Sexual activity: Yes     Partners: Male     Review of Systems   Constitutional:  Positive for activity change (limited) and fatigue. Respiratory: Negative. Cardiovascular: Negative. Genitourinary: Negative. Musculoskeletal:  Positive for back pain, myalgias and neck pain. Neurological:  Negative for weakness and numbness. Psychiatric/Behavioral:  Positive for sleep disturbance. History anxiety     Objective:   Physical Exam  Vitals reviewed. Constitutional:       General: She is not in acute distress. Appearance: She is well-developed. She is not ill-appearing, toxic-appearing or diaphoretic. Interventions: She is not intubated. HENT:      Head: Normocephalic and atraumatic. Cardiovascular:      Rate and Rhythm: Normal rate. Pulmonary:      Effort: Pulmonary effort is normal. No tachypnea, bradypnea, accessory muscle usage, prolonged expiration, respiratory distress or retractions. She is not intubated. Musculoskeletal:        Back:    Skin:     General: Skin is warm and dry.       Capillary Refill: Capillary refill takes less than 2 seconds. Coloration: Skin is not pale. Nails: There is no clubbing. Neurological:      Mental Status: She is alert and oriented to person, place, and time. She is not disoriented. GCS: GCS eye subscore is 4. GCS verbal subscore is 5. GCS motor subscore is 6. Cranial Nerves: No cranial nerve deficit. Psychiatric:         Mood and Affect: Mood and affect normal.         Speech: She is communicative. Behavior: Behavior normal. Behavior is not agitated, aggressive or withdrawn. Behavior is cooperative. Cognition and Memory: Cognition and memory normal.         Judgment: Judgment normal. Judgment is not impulsive or inappropriate. Assessment:      1. Chronic pain of multiple joints    2. Cervicalgia    3. Fibromyalgia    4. Myofascial pain            Plan:       Chronic pain diagnoses such as   1. Chronic pain of multiple joints    2. Cervicalgia    3. Fibromyalgia    4. Myofascial pain       controlled on current medication regime, wll continue current pain medications to improve quality of life and function. Harleen Moore is here for recheck/reevaluation of chronic pain. She is able to maintain daily activities with the use of current pain medications and is generally satisfied with level of analgesia. She shows no evidence of misuse or misappropriation of medications. She denies use of alcohol or illegal substances. UDS have been Appropriate with most recent screen     Controlled Substances Monitoring:     RX Monitoring 2/15/2023   Attestation -   Periodic Controlled Substance Monitoring No signs of potential drug abuse or diversion identified.;Obtaining appropriate analgesic effect of treatment.    Chronic Pain > 50 MEDD -   Chronic Pain > 80 MEDD -     Follow up 2 months      JACKLYN Rai - CNP

## 2023-03-02 DIAGNOSIS — M77.11 RIGHT LATERAL EPICONDYLITIS: ICD-10-CM

## 2023-03-02 NOTE — TELEPHONE ENCOUNTER
Roro Fregoso called requesting a refill of the below medication which has been pended for you:     Requested Prescriptions     Pending Prescriptions Disp Refills    oxyCODONE-acetaminophen (PERCOCET)  MG per tablet 120 tablet 0     Sig: Take 1 tablet by mouth every 6 hours as needed for Pain for up to 30 days. Intended supply: 30 days       Last Appointment Date: 2/15/2023  Next Appointment Date: 4/12/2023    No Known Allergies    Pharmacy:  [facility]a [facility]ms Report checked for [place], [place], and [place]:Percocet 10/325 02/06/23 #120. Due 03/08/23.

## 2023-03-06 RX ORDER — OXYCODONE AND ACETAMINOPHEN 10; 325 MG/1; MG/1
1 TABLET ORAL EVERY 6 HOURS PRN
Qty: 120 TABLET | Refills: 0 | Status: SHIPPED | OUTPATIENT
Start: 2023-03-08 | End: 2023-04-07

## 2023-03-15 ENCOUNTER — TELEPHONE (OUTPATIENT)
Dept: PAIN MANAGEMENT | Age: 58
End: 2023-03-15

## 2023-03-15 NOTE — TELEPHONE ENCOUNTER
Patient called because Vic Jaimes could only half of her prescription of Percocet because they are out. Patient states she has an almost full prescription of Norco left and wanted to know if she could take that. Per Heather Ferrell, this is okay. Patient verbalized understanding and had no further questions.

## 2023-05-05 DIAGNOSIS — M77.11 RIGHT LATERAL EPICONDYLITIS: ICD-10-CM

## 2023-05-05 NOTE — TELEPHONE ENCOUNTER
Paul Villagran called requesting a refill of the below medication which has been pended for you:     Requested Prescriptions     Pending Prescriptions Disp Refills    oxyCODONE-acetaminophen (PERCOCET)  MG per tablet 120 tablet 0     Sig: Take 1 tablet by mouth every 6 hours as needed for Pain for up to 30 days. Intended supply: 30 days       Last Appointment Date: 4/12/2023  Next Appointment Date: 6/12/2023    No Known Allergies    Pharmacy:  [facility] pharmacy    Last DS11 4/12/23. Please review. OARRS Report checked for [place], [place], and [place]: Percocet 10/325 mg   4/11/23   #120  . Due 5/11/23.

## 2023-05-06 RX ORDER — OXYCODONE AND ACETAMINOPHEN 10; 325 MG/1; MG/1
1 TABLET ORAL EVERY 6 HOURS PRN
Qty: 120 TABLET | Refills: 0 | Status: SHIPPED | OUTPATIENT
Start: 2023-05-11 | End: 2023-06-10

## 2023-06-05 DIAGNOSIS — M77.11 RIGHT LATERAL EPICONDYLITIS: ICD-10-CM

## 2023-06-05 RX ORDER — OXYCODONE AND ACETAMINOPHEN 10; 325 MG/1; MG/1
1 TABLET ORAL EVERY 6 HOURS PRN
Qty: 120 TABLET | Refills: 0 | Status: SHIPPED | OUTPATIENT
Start: 2023-06-10 | End: 2023-07-10

## 2023-06-05 NOTE — TELEPHONE ENCOUNTER
Last Appt:  4/12/2023  Next Appt:   7/6/2023  Med verified in [facility] checked for [place], [place], and [place]: 5/11/2023 Percocet 10/325 #120. Due 06/10/2023.

## 2023-07-06 ENCOUNTER — OFFICE VISIT (OUTPATIENT)
Dept: PAIN MANAGEMENT | Age: 58
End: 2023-07-06
Payer: MEDICARE

## 2023-07-06 ENCOUNTER — NURSE ONLY (OUTPATIENT)
Dept: LAB | Age: 58
End: 2023-07-06
Payer: MEDICARE

## 2023-07-06 VITALS
RESPIRATION RATE: 18 BRPM | OXYGEN SATURATION: 98 % | HEART RATE: 84 BPM | SYSTOLIC BLOOD PRESSURE: 144 MMHG | HEIGHT: 63 IN | BODY MASS INDEX: 27.64 KG/M2 | WEIGHT: 156 LBS | DIASTOLIC BLOOD PRESSURE: 92 MMHG

## 2023-07-06 DIAGNOSIS — G89.29 CHRONIC PAIN OF MULTIPLE JOINTS: ICD-10-CM

## 2023-07-06 DIAGNOSIS — M79.7 FIBROMYALGIA: Primary | ICD-10-CM

## 2023-07-06 DIAGNOSIS — M25.50 CHRONIC PAIN OF MULTIPLE JOINTS: ICD-10-CM

## 2023-07-06 DIAGNOSIS — M54.2 CERVICALGIA: ICD-10-CM

## 2023-07-06 DIAGNOSIS — M79.18 MYOFASCIAL PAIN: ICD-10-CM

## 2023-07-06 DIAGNOSIS — M79.7 FIBROMYALGIA: ICD-10-CM

## 2023-07-06 DIAGNOSIS — M77.11 RIGHT LATERAL EPICONDYLITIS: Primary | ICD-10-CM

## 2023-07-06 PROCEDURE — 3017F COLORECTAL CA SCREEN DOC REV: CPT | Performed by: NURSE PRACTITIONER

## 2023-07-06 PROCEDURE — PBSHW PBB SHADOW CHARGE: Performed by: NURSE PRACTITIONER

## 2023-07-06 PROCEDURE — 4004F PT TOBACCO SCREEN RCVD TLK: CPT | Performed by: NURSE PRACTITIONER

## 2023-07-06 PROCEDURE — 99214 OFFICE O/P EST MOD 30 MIN: CPT | Performed by: NURSE PRACTITIONER

## 2023-07-06 PROCEDURE — G8427 DOCREV CUR MEDS BY ELIG CLIN: HCPCS | Performed by: NURSE PRACTITIONER

## 2023-07-06 PROCEDURE — G8419 CALC BMI OUT NRM PARAM NOF/U: HCPCS | Performed by: NURSE PRACTITIONER

## 2023-07-06 RX ORDER — KETOROLAC TROMETHAMINE 10 MG/1
10 TABLET, FILM COATED ORAL EVERY 6 HOURS PRN
Qty: 20 TABLET | Refills: 1 | Status: SHIPPED | OUTPATIENT
Start: 2023-07-06

## 2023-07-06 RX ORDER — BUSPIRONE HYDROCHLORIDE 15 MG/1
TABLET ORAL
COMMUNITY
Start: 2023-05-05

## 2023-07-06 RX ORDER — KETOROLAC TROMETHAMINE 30 MG/ML
60 INJECTION, SOLUTION INTRAMUSCULAR; INTRAVENOUS ONCE
Status: COMPLETED | OUTPATIENT
Start: 2023-07-06 | End: 2023-07-06

## 2023-07-06 RX ORDER — OXYCODONE AND ACETAMINOPHEN 10; 325 MG/1; MG/1
1 TABLET ORAL EVERY 6 HOURS PRN
Qty: 120 TABLET | Refills: 0 | Status: SHIPPED | OUTPATIENT
Start: 2023-07-10 | End: 2023-08-09

## 2023-07-06 RX ORDER — IBUPROFEN 800 MG/1
800 TABLET ORAL
Qty: 270 TABLET | Refills: 1 | Status: SHIPPED | OUTPATIENT
Start: 2023-07-06

## 2023-07-06 RX ORDER — TRIAMCINOLONE ACETONIDE 40 MG/ML
40 INJECTION, SUSPENSION INTRA-ARTICULAR; INTRAMUSCULAR ONCE
Status: COMPLETED | OUTPATIENT
Start: 2023-07-06 | End: 2023-07-06

## 2023-07-06 RX ADMIN — KETOROLAC TROMETHAMINE 60 MG: 30 INJECTION, SOLUTION INTRAMUSCULAR at 14:52

## 2023-07-06 RX ADMIN — TRIAMCINOLONE ACETONIDE 40 MG: 40 INJECTION, SUSPENSION INTRA-ARTICULAR; INTRAMUSCULAR at 14:52

## 2023-07-06 ASSESSMENT — ENCOUNTER SYMPTOMS
RESPIRATORY NEGATIVE: 1
BACK PAIN: 1

## 2023-07-06 NOTE — PROGRESS NOTES
Injections given per documented order in right deltoid per patient request due to muscle spasms. Patient tolerated well.

## 2023-07-06 NOTE — PROGRESS NOTES
Subjective:      Patient ID: Jennie Cyr is a 59 y.o. female. Chief Complaint   Patient presents with    Pain     All over     Neck Pain   Pertinent negatives include no numbness or weakness. Pain  Associated symptoms include arthralgias, fatigue, myalgias and neck pain. Pertinent negatives include no numbness or weakness. Joint Pain   Pertinent negatives include no numbness. Here today for routine pain clinic recheck, chronic myofascial pain, chronic joint pain. Routine chiropractic care    Pain Assessment  Location of Pain: Other (Comment) (all over)  Location Modifiers: Other (Comment) (all over)  Severity of Pain: 8  Quality of Pain: Throbbing, Sharp, Aching, Dull  Duration of Pain: Persistent  Frequency of Pain: Constant  Aggravating Factors:  (weather moving)  Limiting Behavior: Yes  Relieving Factors: Heat, Ice (meds)  Result of Injury: No  Work-Related Injury: No  Are there other pain locations you wish to document?: No    No Known Allergies    Outpatient Medications Marked as Taking for the 7/6/23 encounter (Office Visit) with JACKLYN Flores CNP   Medication Sig Dispense Refill    busPIRone (BUSPAR) 15 MG tablet TAKE 1 TABLET BY MOUTH 2 TIMES A DAY AS NEEDED FOR ANXIETY. [START ON 7/10/2023] oxyCODONE-acetaminophen (PERCOCET)  MG per tablet Take 1 tablet by mouth every 6 hours as needed for Pain for up to 30 days.  Intended supply: 30 days 120 tablet 0    ibuprofen (ADVIL;MOTRIN) 800 MG tablet Take 1 tablet by mouth 3 times daily (with meals) 270 tablet 1    ketorolac (TORADOL) 10 MG tablet Take 1 tablet by mouth every 6 hours as needed for Pain 20 tablet 1    ibuprofen (ADVIL;MOTRIN) 600 MG tablet Take 1 tablet by mouth 2 times daily      baclofen (LIORESAL) 10 MG tablet Take 1 tablet by mouth 3 times daily 270 tablet 3    diclofenac sodium (VOLTAREN) 1 % GEL Apply 4 g topically 4 times daily 350 g 5    desvenlafaxine succinate (PRISTIQ) 100 MG TB24 extended release

## 2023-08-07 DIAGNOSIS — M77.11 RIGHT LATERAL EPICONDYLITIS: ICD-10-CM

## 2023-08-07 DIAGNOSIS — M79.7 FIBROMYALGIA: ICD-10-CM

## 2023-08-07 DIAGNOSIS — G89.29 CHRONIC PAIN OF MULTIPLE JOINTS: ICD-10-CM

## 2023-08-07 DIAGNOSIS — M25.50 CHRONIC PAIN OF MULTIPLE JOINTS: ICD-10-CM

## 2023-08-07 RX ORDER — OXYCODONE AND ACETAMINOPHEN 10; 325 MG/1; MG/1
1 TABLET ORAL EVERY 6 HOURS PRN
Qty: 120 TABLET | Refills: 0 | Status: SHIPPED | OUTPATIENT
Start: 2023-08-09 | End: 2023-09-08

## 2023-08-07 NOTE — TELEPHONE ENCOUNTER
Marley Patricio called requesting a refill of the below medication which has been pended for you:     Requested Prescriptions     Pending Prescriptions Disp Refills    oxyCODONE-acetaminophen (PERCOCET)  MG per tablet 120 tablet 0     Sig: Take 1 tablet by mouth every 6 hours as needed for Pain for up to 30 days. Intended supply: 30 days       Last Appointment Date: 7/6/2023  Next Appointment Date: 9/11/2023    No Known Allergies    Pharmacy:  [address] [place] [address] Report checked for [place], [place], and [place]:Percocet 10/325 07/10/23 #120. Due NOW.

## 2023-09-05 DIAGNOSIS — M79.7 FIBROMYALGIA: ICD-10-CM

## 2023-09-05 DIAGNOSIS — M77.11 RIGHT LATERAL EPICONDYLITIS: ICD-10-CM

## 2023-09-05 DIAGNOSIS — M25.50 CHRONIC PAIN OF MULTIPLE JOINTS: ICD-10-CM

## 2023-09-05 DIAGNOSIS — G89.29 CHRONIC PAIN OF MULTIPLE JOINTS: ICD-10-CM

## 2023-09-05 NOTE — TELEPHONE ENCOUNTER
Marley Patricio called requesting a refill of the below medication which has been pended for you:     Requested Prescriptions     Pending Prescriptions Disp Refills    oxyCODONE-acetaminophen (PERCOCET)  MG per tablet 120 tablet 0     Sig: Take 1 tablet by mouth every 6 hours as needed for Pain for up to 30 days. Intended supply: 30 days       Last Appointment Date: 7/6/2023  Next Appointment Date: 9/11/2023    No Known Allergies    Pharmacy:  [facility] Report checked for [place], [place], and [place]:Percocet 10/325 08/09/23 #120. Due 09/08/23.

## 2023-09-06 RX ORDER — OXYCODONE AND ACETAMINOPHEN 10; 325 MG/1; MG/1
1 TABLET ORAL EVERY 6 HOURS PRN
Qty: 120 TABLET | Refills: 0 | Status: SHIPPED | OUTPATIENT
Start: 2023-09-08 | End: 2023-10-08

## 2023-09-11 ENCOUNTER — OFFICE VISIT (OUTPATIENT)
Dept: PAIN MANAGEMENT | Age: 58
End: 2023-09-11
Payer: MEDICARE

## 2023-09-11 ENCOUNTER — NURSE ONLY (OUTPATIENT)
Dept: LAB | Age: 58
End: 2023-09-11
Payer: MEDICARE

## 2023-09-11 VITALS
BODY MASS INDEX: 27.29 KG/M2 | DIASTOLIC BLOOD PRESSURE: 102 MMHG | HEIGHT: 63 IN | OXYGEN SATURATION: 99 % | SYSTOLIC BLOOD PRESSURE: 158 MMHG | RESPIRATION RATE: 17 BRPM | WEIGHT: 154 LBS | HEART RATE: 82 BPM

## 2023-09-11 DIAGNOSIS — G89.29 CHRONIC PAIN OF MULTIPLE JOINTS: ICD-10-CM

## 2023-09-11 DIAGNOSIS — M25.50 CHRONIC PAIN OF MULTIPLE JOINTS: ICD-10-CM

## 2023-09-11 DIAGNOSIS — M54.2 CERVICALGIA: ICD-10-CM

## 2023-09-11 DIAGNOSIS — M79.7 FIBROMYALGIA: Primary | ICD-10-CM

## 2023-09-11 DIAGNOSIS — M79.18 MYOFASCIAL PAIN: ICD-10-CM

## 2023-09-11 PROCEDURE — PBSHW PBB SHADOW CHARGE: Performed by: NURSE PRACTITIONER

## 2023-09-11 PROCEDURE — 99214 OFFICE O/P EST MOD 30 MIN: CPT | Performed by: NURSE PRACTITIONER

## 2023-09-11 PROCEDURE — 3017F COLORECTAL CA SCREEN DOC REV: CPT | Performed by: NURSE PRACTITIONER

## 2023-09-11 PROCEDURE — G8427 DOCREV CUR MEDS BY ELIG CLIN: HCPCS | Performed by: NURSE PRACTITIONER

## 2023-09-11 PROCEDURE — 4004F PT TOBACCO SCREEN RCVD TLK: CPT | Performed by: NURSE PRACTITIONER

## 2023-09-11 PROCEDURE — 96372 THER/PROPH/DIAG INJ SC/IM: CPT

## 2023-09-11 PROCEDURE — G8419 CALC BMI OUT NRM PARAM NOF/U: HCPCS | Performed by: NURSE PRACTITIONER

## 2023-09-11 RX ORDER — PREDNISONE 20 MG/1
TABLET ORAL
COMMUNITY
Start: 2023-08-31

## 2023-09-11 RX ORDER — KETOROLAC TROMETHAMINE 30 MG/ML
60 INJECTION, SOLUTION INTRAMUSCULAR; INTRAVENOUS ONCE
Status: COMPLETED | OUTPATIENT
Start: 2023-09-11 | End: 2023-09-11

## 2023-09-11 RX ORDER — KETOROLAC TROMETHAMINE 10 MG/1
10 TABLET, FILM COATED ORAL EVERY 6 HOURS PRN
Qty: 20 TABLET | Refills: 0 | Status: SHIPPED | OUTPATIENT
Start: 2023-09-11

## 2023-09-11 RX ADMIN — KETOROLAC TROMETHAMINE 60 MG: 30 INJECTION, SOLUTION INTRAMUSCULAR at 14:40

## 2023-09-11 ASSESSMENT — ENCOUNTER SYMPTOMS
BACK PAIN: 1
RESPIRATORY NEGATIVE: 1

## 2023-09-11 NOTE — PROGRESS NOTES
Subjective:      Patient ID: Thaddeus Jonas is a 59 y.o. female. Chief Complaint   Patient presents with    Joint Pain    Chronic Pain     Neck Pain   Pertinent negatives include no numbness or weakness. Pain  Associated symptoms include arthralgias, fatigue, myalgias and neck pain. Pertinent negatives include no numbness or weakness. Joint Pain   Pertinent negatives include no numbness. Chronic Pain  Associated symptoms include fatigue. Pertinent negatives include no weakness. Here today for routine pain clinic recheck, chronic myofascial pain, chronic joint pain. Routine chiropractic care    Upcoming cervical MRI, increased neck discomfort, crepitus, prescribed prednisone    Pain Assessment  Location of Pain: Other (Comment) (all over body)  Location Modifiers: Other (Comment) (all over body)  Severity of Pain: 7  Quality of Pain: Throbbing, Sharp, Dull, Aching  Duration of Pain: Persistent  Frequency of Pain: Constant  Aggravating Factors: Bending, Kneeling, Squatting, Standing, Walking, Stairs (weather)  Limiting Behavior: Yes  Relieving Factors: Rest, Ice, Heat, Exercise (meds)  Result of Injury: No  Work-Related Injury: No  Are there other pain locations you wish to document?: No    No Known Allergies    Outpatient Medications Marked as Taking for the 9/11/23 encounter (Office Visit) with JACKLYN Cazares CNP   Medication Sig Dispense Refill    predniSONE (DELTASONE) 20 MG tablet       oxyCODONE-acetaminophen (PERCOCET)  MG per tablet Take 1 tablet by mouth every 6 hours as needed for Pain for up to 30 days. Intended supply: 30 days 120 tablet 0    busPIRone (BUSPAR) 15 MG tablet TAKE 1 TABLET BY MOUTH 2 TIMES A DAY AS NEEDED FOR ANXIETY.       ibuprofen (ADVIL;MOTRIN) 800 MG tablet Take 1 tablet by mouth 3 times daily (with meals) 270 tablet 1    ketorolac (TORADOL) 10 MG tablet Take 1 tablet by mouth every 6 hours as needed for Pain 20 tablet 1    ibuprofen (ADVIL;MOTRIN) 600

## 2023-10-02 DIAGNOSIS — M79.7 FIBROMYALGIA: ICD-10-CM

## 2023-10-02 DIAGNOSIS — M25.50 CHRONIC PAIN OF MULTIPLE JOINTS: ICD-10-CM

## 2023-10-02 DIAGNOSIS — M77.11 RIGHT LATERAL EPICONDYLITIS: ICD-10-CM

## 2023-10-02 DIAGNOSIS — G89.29 CHRONIC PAIN OF MULTIPLE JOINTS: ICD-10-CM

## 2023-10-02 RX ORDER — OXYCODONE AND ACETAMINOPHEN 10; 325 MG/1; MG/1
1 TABLET ORAL EVERY 6 HOURS PRN
Qty: 120 TABLET | Refills: 0 | Status: SHIPPED | OUTPATIENT
Start: 2023-10-07 | End: 2023-10-02 | Stop reason: SDUPTHER

## 2023-10-02 RX ORDER — OXYCODONE AND ACETAMINOPHEN 10; 325 MG/1; MG/1
1 TABLET ORAL EVERY 6 HOURS PRN
Qty: 120 TABLET | Refills: 0 | Status: SHIPPED | OUTPATIENT
Start: 2023-10-07 | End: 2023-11-06

## 2023-10-02 RX ORDER — BACLOFEN 10 MG/1
10 TABLET ORAL 3 TIMES DAILY
Qty: 270 TABLET | Refills: 3 | Status: SHIPPED | OUTPATIENT
Start: 2023-10-02

## 2023-10-02 RX ORDER — BACLOFEN 10 MG/1
10 TABLET ORAL 3 TIMES DAILY
Qty: 270 TABLET | Refills: 3 | Status: SHIPPED | OUTPATIENT
Start: 2023-10-02 | End: 2023-10-02 | Stop reason: SDUPTHER

## 2023-10-02 NOTE — TELEPHONE ENCOUNTER
The patient moved to [facility] and would like her medication to be sent to [facility].

## 2023-10-02 NOTE — TELEPHONE ENCOUNTER
Misael Carvajal called requesting a refill of the below medication which has been pended for you:     Requested Prescriptions     Pending Prescriptions Disp Refills    oxyCODONE-acetaminophen (PERCOCET)  MG per tablet 120 tablet 0     Sig: Take 1 tablet by mouth every 6 hours as needed for Pain for up to 30 days. Intended supply: 30 days    baclofen (LIORESAL) 10 MG tablet 270 tablet 3     Sig: Take 1 tablet by mouth 3 times daily       Last Appointment Date: 9/11/2023  Next Appointment Date: 10/11/2023    No Known Allergies    Pharmacy:  [address] Report checked for [place], [place], and [place]:Percocet 10/325 09/08/23 #120. Due 10/08/23.     Early fill due to pharmacy hours

## 2023-10-11 ENCOUNTER — OFFICE VISIT (OUTPATIENT)
Dept: PAIN MANAGEMENT | Age: 58
End: 2023-10-11
Payer: MEDICARE

## 2023-10-11 VITALS
BODY MASS INDEX: 27.46 KG/M2 | HEART RATE: 97 BPM | HEIGHT: 63 IN | OXYGEN SATURATION: 97 % | SYSTOLIC BLOOD PRESSURE: 140 MMHG | DIASTOLIC BLOOD PRESSURE: 80 MMHG | WEIGHT: 155 LBS

## 2023-10-11 DIAGNOSIS — M79.7 FIBROMYALGIA: Primary | ICD-10-CM

## 2023-10-11 DIAGNOSIS — M54.2 CERVICALGIA: ICD-10-CM

## 2023-10-11 DIAGNOSIS — M79.18 MYOFASCIAL PAIN: ICD-10-CM

## 2023-10-11 DIAGNOSIS — G89.29 CHRONIC PAIN OF MULTIPLE JOINTS: ICD-10-CM

## 2023-10-11 DIAGNOSIS — M25.50 CHRONIC PAIN OF MULTIPLE JOINTS: ICD-10-CM

## 2023-10-11 PROCEDURE — G8419 CALC BMI OUT NRM PARAM NOF/U: HCPCS | Performed by: NURSE PRACTITIONER

## 2023-10-11 PROCEDURE — G8484 FLU IMMUNIZE NO ADMIN: HCPCS | Performed by: NURSE PRACTITIONER

## 2023-10-11 PROCEDURE — 3017F COLORECTAL CA SCREEN DOC REV: CPT | Performed by: NURSE PRACTITIONER

## 2023-10-11 PROCEDURE — 99214 OFFICE O/P EST MOD 30 MIN: CPT | Performed by: NURSE PRACTITIONER

## 2023-10-11 PROCEDURE — G8427 DOCREV CUR MEDS BY ELIG CLIN: HCPCS | Performed by: NURSE PRACTITIONER

## 2023-10-11 PROCEDURE — 4004F PT TOBACCO SCREEN RCVD TLK: CPT | Performed by: NURSE PRACTITIONER

## 2023-10-11 ASSESSMENT — ENCOUNTER SYMPTOMS
BACK PAIN: 1
RESPIRATORY NEGATIVE: 1

## 2023-10-11 NOTE — PROGRESS NOTES
Subjective:      Patient ID: Ford Miranda is a 59 y.o. female. Chief Complaint   Patient presents with    Neck Pain     1 mon     Neck Pain   Pertinent negatives include no numbness or weakness. Pain  Associated symptoms include arthralgias, fatigue, myalgias and neck pain. Pertinent negatives include no numbness or weakness. Joint Pain   Pertinent negatives include no numbness. Chronic Pain  Associated symptoms include fatigue. Pertinent negatives include no weakness. Here today for routine pain clinic recheck, chronic myofascial pain, chronic joint pain. Routine chiropractic care    Upcoming cervical MRI, increased neck discomfort, crepitus, upcoming appointment with neurosurgery/ortho    Pain Assessment  Location of Pain: Neck  Location Modifiers: Dorsal  Severity of Pain: 7  Quality of Pain: Throbbing, Sharp, Dull, Aching  Duration of Pain: Persistent  Frequency of Pain: Constant  Aggravating Factors:  (moving)  Limiting Behavior: Yes  Relieving Factors: Heat, Ice, Nsaids  Result of Injury: No  Work-Related Injury: No  Are there other pain locations you wish to document?: No    No Known Allergies    Outpatient Medications Marked as Taking for the 10/11/23 encounter (Office Visit) with Cherylene Seeds, APRN - CNP   Medication Sig Dispense Refill    oxyCODONE-acetaminophen (PERCOCET)  MG per tablet Take 1 tablet by mouth every 6 hours as needed for Pain for up to 30 days. Intended supply: 30 days 120 tablet 0    baclofen (LIORESAL) 10 MG tablet Take 1 tablet by mouth 3 times daily 270 tablet 3    busPIRone (BUSPAR) 15 MG tablet TAKE 1 TABLET BY MOUTH 2 TIMES A DAY AS NEEDED FOR ANXIETY.       ibuprofen (ADVIL;MOTRIN) 800 MG tablet Take 1 tablet by mouth 3 times daily (with meals) 270 tablet 1    Elastic Bandages & Supports (ACE TENNIS ELBOW BRACE) MISC Right elbow daily 1 each 0    diclofenac sodium (VOLTAREN) 1 % GEL Apply 4 g topically 4 times daily 350 g 5    desvenlafaxine succinate

## 2023-11-03 DIAGNOSIS — G89.29 CHRONIC PAIN OF MULTIPLE JOINTS: ICD-10-CM

## 2023-11-03 DIAGNOSIS — M77.11 RIGHT LATERAL EPICONDYLITIS: ICD-10-CM

## 2023-11-03 DIAGNOSIS — M25.50 CHRONIC PAIN OF MULTIPLE JOINTS: ICD-10-CM

## 2023-11-03 DIAGNOSIS — M79.7 FIBROMYALGIA: ICD-10-CM

## 2023-11-03 RX ORDER — OXYCODONE AND ACETAMINOPHEN 10; 325 MG/1; MG/1
1 TABLET ORAL EVERY 6 HOURS PRN
Qty: 120 TABLET | Refills: 0 | Status: SHIPPED | OUTPATIENT
Start: 2023-11-06 | End: 2023-12-06

## 2023-11-03 NOTE — TELEPHONE ENCOUNTER
Marley Patricio called requesting a refill of the below medication which has been pended for you:     Requested Prescriptions     Pending Prescriptions Disp Refills    oxyCODONE-acetaminophen (PERCOCET)  MG per tablet 120 tablet 0     Sig: Take 1 tablet by mouth every 6 hours as needed for Pain for up to 30 days. Intended supply: 30 days       Last Appointment Date: 10/11/2023  Next Appointment Date: 11/22/2023    No Known Allergies    Pharmacy:  [address] [place] [address] Report checked for [place], [place], and [place]:Percocet 10/325 10/7/23 #120. Due 11/6/23.

## 2023-11-22 ENCOUNTER — TELEMEDICINE (OUTPATIENT)
Dept: PAIN MANAGEMENT | Age: 58
End: 2023-11-22
Payer: MEDICARE

## 2023-11-22 DIAGNOSIS — M79.18 MYOFASCIAL PAIN: ICD-10-CM

## 2023-11-22 DIAGNOSIS — M77.11 RIGHT LATERAL EPICONDYLITIS: ICD-10-CM

## 2023-11-22 DIAGNOSIS — M79.7 FIBROMYALGIA: ICD-10-CM

## 2023-11-22 DIAGNOSIS — M54.2 CERVICALGIA: Primary | ICD-10-CM

## 2023-11-22 DIAGNOSIS — G89.29 CHRONIC PAIN OF MULTIPLE JOINTS: ICD-10-CM

## 2023-11-22 DIAGNOSIS — M25.50 CHRONIC PAIN OF MULTIPLE JOINTS: ICD-10-CM

## 2023-11-22 PROCEDURE — 99212 OFFICE O/P EST SF 10 MIN: CPT | Performed by: NURSE PRACTITIONER

## 2023-11-22 PROCEDURE — G8427 DOCREV CUR MEDS BY ELIG CLIN: HCPCS | Performed by: NURSE PRACTITIONER

## 2023-11-22 PROCEDURE — 3017F COLORECTAL CA SCREEN DOC REV: CPT | Performed by: NURSE PRACTITIONER

## 2023-11-22 PROCEDURE — 99214 OFFICE O/P EST MOD 30 MIN: CPT | Performed by: NURSE PRACTITIONER

## 2023-11-22 RX ORDER — OXYCODONE AND ACETAMINOPHEN 10; 325 MG/1; MG/1
1 TABLET ORAL EVERY 6 HOURS PRN
Qty: 120 TABLET | Refills: 0 | Status: SHIPPED | OUTPATIENT
Start: 2023-12-06 | End: 2024-01-05

## 2023-11-22 ASSESSMENT — ENCOUNTER SYMPTOMS
BACK PAIN: 1
RESPIRATORY NEGATIVE: 1

## 2023-11-22 NOTE — PROGRESS NOTES
2023    TELEHEALTH EVALUATION -- Audio/Visual    HPI:    Rosario Franco (:  1965) has requested an audio/video evaluation for the following concern(s):    Recheck, chronic pain. Routine follow up, med management    Review of Systems   Constitutional:  Positive for activity change (limited) and fatigue. Respiratory: Negative. Cardiovascular: Negative. Genitourinary: Negative. Musculoskeletal:  Positive for arthralgias, back pain, myalgias and neck pain. Neurological:  Negative for weakness and numbness. Psychiatric/Behavioral:  Positive for sleep disturbance. History anxiety       Prior to Visit Medications    Medication Sig Taking? Authorizing Provider   oxyCODONE-acetaminophen (PERCOCET)  MG per tablet Take 1 tablet by mouth every 6 hours as needed for Pain for up to 30 days. Intended supply: 30 days Yes Isaura Conrad APRN - CNP   baclofen (LIORESAL) 10 MG tablet Take 1 tablet by mouth 3 times daily  Christina Conrad APRN - CNP   predniSONE (DELTASONE) 20 MG tablet   Holli Dawkins MD   ketorolac (TORADOL) 10 MG tablet Take 1 tablet by mouth every 6 hours as needed for Pain  Patient not taking: Reported on 10/11/2023  JACKLYN Augustin CNP   busPIRone (BUSPAR) 15 MG tablet TAKE 1 TABLET BY MOUTH 2 TIMES A DAY AS NEEDED FOR ANXIETY.   Holli Dawkins MD   ibuprofen (ADVIL;MOTRIN) 800 MG tablet Take 1 tablet by mouth 3 times daily (with meals)  Fransisco Conrad APRN - CNP   ketorolac (TORADOL) 10 MG tablet Take 1 tablet by mouth every 6 hours as needed for Pain  Patient not taking: Reported on 10/11/2023  JACKLYN Augustin CNP   ibuprofen (ADVIL;MOTRIN) 600 MG tablet Take 1 tablet by mouth 2 times daily  Patient not taking: Reported on 10/11/2023  Holli Dawkins MD   Elastic Bandages & Supports (ACE TENNIS ELBOW BRACE) MISC Right elbow daily  JACKLYN Augustin CNP   diclofenac sodium (VOLTAREN) 1 % GEL Apply 4 g topically 4 Pt. HR tachycardic and irregular.

## 2023-12-26 DIAGNOSIS — M79.7 FIBROMYALGIA: ICD-10-CM

## 2023-12-26 DIAGNOSIS — M79.18 MYOFASCIAL PAIN: ICD-10-CM

## 2023-12-26 RX ORDER — IBUPROFEN 800 MG/1
800 TABLET ORAL EVERY 6 HOURS PRN
Qty: 360 TABLET | Refills: 3 | Status: SHIPPED | OUTPATIENT
Start: 2023-12-26

## 2023-12-26 NOTE — TELEPHONE ENCOUNTER
[place] called requesting a refill of the below medication which has been pended for you:     Requested Prescriptions     Pending Prescriptions Disp Refills    ibuprofen (ADVIL;MOTRIN) 800 MG tablet 360 tablet 3     Sig: Take 1 tablet by mouth every 6 hours as needed for Pain       Last Appointment Date: 11/22/2023  Next Appointment Date: 1/4/2024    No Known Allergies    Pharmacy:  [facility] 29-Jul-2017

## 2024-01-02 DIAGNOSIS — M77.11 RIGHT LATERAL EPICONDYLITIS: ICD-10-CM

## 2024-01-02 DIAGNOSIS — M25.50 CHRONIC PAIN OF MULTIPLE JOINTS: ICD-10-CM

## 2024-01-02 DIAGNOSIS — M79.7 FIBROMYALGIA: ICD-10-CM

## 2024-01-02 DIAGNOSIS — G89.29 CHRONIC PAIN OF MULTIPLE JOINTS: ICD-10-CM

## 2024-01-02 RX ORDER — OXYCODONE AND ACETAMINOPHEN 10; 325 MG/1; MG/1
1 TABLET ORAL EVERY 6 HOURS PRN
Qty: 120 TABLET | Refills: 0 | Status: CANCELLED | OUTPATIENT
Start: 2024-01-05 | End: 2024-02-04

## 2024-01-02 NOTE — TELEPHONE ENCOUNTER
Laura J Hilton  called requesting a refill of the below medication which has been pended for you:     Requested Prescriptions      No prescriptions requested or ordered in this encounter       No Known Allergies          Last Drug Screen:  4-12-23  Last Appointment:  11/22/2023   Next Appointment:  1-4-23    OARRS Report checked for [place], [place], and [place]: Percocet 10mg  #120. Due 1-5-24.

## 2024-01-03 DIAGNOSIS — M79.7 FIBROMYALGIA: ICD-10-CM

## 2024-01-03 DIAGNOSIS — M79.18 MYOFASCIAL PAIN: ICD-10-CM

## 2024-01-03 RX ORDER — IBUPROFEN 800 MG/1
800 TABLET ORAL EVERY 6 HOURS PRN
Qty: 360 TABLET | Refills: 3 | Status: SHIPPED | OUTPATIENT
Start: 2024-01-03

## 2024-01-04 ENCOUNTER — NURSE ONLY (OUTPATIENT)
Dept: LAB | Age: 59
End: 2024-01-04
Payer: MEDICARE

## 2024-01-04 ENCOUNTER — OFFICE VISIT (OUTPATIENT)
Dept: PAIN MANAGEMENT | Age: 59
End: 2024-01-04
Payer: MEDICARE

## 2024-01-04 ENCOUNTER — HOSPITAL ENCOUNTER (OUTPATIENT)
Age: 59
Setting detail: SPECIMEN
Discharge: HOME | End: 2024-01-04
Payer: MEDICARE

## 2024-01-04 VITALS
BODY MASS INDEX: 27.46 KG/M2 | HEART RATE: 90 BPM | SYSTOLIC BLOOD PRESSURE: 138 MMHG | DIASTOLIC BLOOD PRESSURE: 82 MMHG | OXYGEN SATURATION: 97 % | RESPIRATION RATE: 18 BRPM | WEIGHT: 155 LBS

## 2024-01-04 DIAGNOSIS — M79.18 MYOFASCIAL PAIN: ICD-10-CM

## 2024-01-04 DIAGNOSIS — G89.29 CHRONIC PAIN OF MULTIPLE JOINTS: Primary | ICD-10-CM

## 2024-01-04 DIAGNOSIS — M25.50 CHRONIC PAIN OF MULTIPLE JOINTS: Primary | ICD-10-CM

## 2024-01-04 DIAGNOSIS — Z02.83 ENCOUNTER FOR DRUG SCREENING: ICD-10-CM

## 2024-01-04 DIAGNOSIS — M79.7 FIBROMYALGIA: ICD-10-CM

## 2024-01-04 DIAGNOSIS — G89.29 CHRONIC PAIN OF MULTIPLE JOINTS: ICD-10-CM

## 2024-01-04 DIAGNOSIS — M25.50 CHRONIC PAIN OF MULTIPLE JOINTS: ICD-10-CM

## 2024-01-04 DIAGNOSIS — M54.2 CERVICALGIA: ICD-10-CM

## 2024-01-04 DIAGNOSIS — Z79.891 ENCOUNTER FOR LONG-TERM OPIATE ANALGESIC USE: ICD-10-CM

## 2024-01-04 DIAGNOSIS — M77.11 RIGHT LATERAL EPICONDYLITIS: ICD-10-CM

## 2024-01-04 PROCEDURE — G0481 DRUG TEST DEF 8-14 CLASSES: HCPCS

## 2024-01-04 PROCEDURE — G8427 DOCREV CUR MEDS BY ELIG CLIN: HCPCS | Performed by: NURSE PRACTITIONER

## 2024-01-04 PROCEDURE — 3017F COLORECTAL CA SCREEN DOC REV: CPT | Performed by: NURSE PRACTITIONER

## 2024-01-04 PROCEDURE — G8419 CALC BMI OUT NRM PARAM NOF/U: HCPCS | Performed by: NURSE PRACTITIONER

## 2024-01-04 PROCEDURE — 99214 OFFICE O/P EST MOD 30 MIN: CPT | Performed by: NURSE PRACTITIONER

## 2024-01-04 PROCEDURE — PBSHW PBB SHADOW CHARGE: Performed by: NURSE PRACTITIONER

## 2024-01-04 PROCEDURE — 80307 DRUG TEST PRSMV CHEM ANLYZR: CPT

## 2024-01-04 PROCEDURE — G8484 FLU IMMUNIZE NO ADMIN: HCPCS | Performed by: NURSE PRACTITIONER

## 2024-01-04 PROCEDURE — 4004F PT TOBACCO SCREEN RCVD TLK: CPT | Performed by: NURSE PRACTITIONER

## 2024-01-04 RX ORDER — OXYCODONE AND ACETAMINOPHEN 10; 325 MG/1; MG/1
1 TABLET ORAL EVERY 6 HOURS PRN
Qty: 120 TABLET | Refills: 0 | Status: SHIPPED | OUTPATIENT
Start: 2024-01-04 | End: 2024-02-03

## 2024-01-04 RX ORDER — KETOROLAC TROMETHAMINE 30 MG/ML
60 INJECTION, SOLUTION INTRAMUSCULAR; INTRAVENOUS ONCE
Status: COMPLETED | OUTPATIENT
Start: 2024-01-04 | End: 2024-01-04

## 2024-01-04 RX ORDER — ESTRADIOL 0.1 MG/G
CREAM VAGINAL
COMMUNITY
Start: 2023-11-13

## 2024-01-04 RX ADMIN — KETOROLAC TROMETHAMINE 60 MG: 30 INJECTION, SOLUTION INTRAMUSCULAR at 14:36

## 2024-01-04 ASSESSMENT — ENCOUNTER SYMPTOMS
RESPIRATORY NEGATIVE: 1
BACK PAIN: 1

## 2024-01-04 ASSESSMENT — PATIENT HEALTH QUESTIONNAIRE - PHQ9
SUM OF ALL RESPONSES TO PHQ9 QUESTIONS 1 & 2: 0
1. LITTLE INTEREST OR PLEASURE IN DOING THINGS: 0
SUM OF ALL RESPONSES TO PHQ QUESTIONS 1-9: 0
2. FEELING DOWN, DEPRESSED OR HOPELESS: 0

## 2024-01-04 NOTE — PROGRESS NOTES
[facility]  [address] [place]. [address]    Patient Name: Laura Jamil  MRN: [id number]  Encounter Date: 1/4/2024     SUBJECTIVE:  Laura Jamil is a 58 y.o., female being seen today regarding   Chief Complaint   Patient presents with    Chronic Pain     2 mo f/u chronic pain (cervical, fibro.).    and routine medication management.    Functionality Assessment & Goals:  On a scale of 0 (Does not Interfere) to 10 (Completely Interferes)  Which number describes how during the past week pain has interfered with the following:   A.  General Activity: ***    B.  Mood:  ***   C.  Walking Ability:  ***   D.  Normal Work (Includes both work outside the home and housework):  ***   E.  Relations with Other People:  ***   F.  Sleep:  ***    G.  Enjoyment of Life:  ***  2.  Patient prefers to Take their Pain Medications:   [] On a regular basis   [] Only when necessary   [] Does not take pain medications  3.  What are the Patient's Goals/ Expectations for Visiting Pain Management?   [] Learn about my pain   [] Physical Therapy   [] Receive Injections   [] Deal with Anxiety and Stress   [] Receive Medication   [] Treat Depression    [] Treat Sleep   [] Treat Opioid Dependence/ Addiction    Recent Imaging since last appointment related to chief complaint:  ***  Recent Interventional Procedures since last appointment related to chief complaint:  ***  Patient reports a *** % improvement of pain and ADL's after procedure.    Most recent Oswestry Disability Questionnaire completed by patient on ***     Current Pain Assessment:         10/11/2023     1:29 PM   AMB PAIN ASSESSMENT   Location of Pain Neck   Location Modifiers Dorsal   Severity of Pain 7   Quality of Pain Throbbing;Sharp;Dull;Aching   Duration of Pain Persistent   Frequency of Pain Constant   Limiting Behavior Yes   Relieving Factors Heat;Ice;Nsaids   Result of Injury No   Work-Related Injury No   Are there other pain 
  [facility]  [address] [place]. [address]    Patient Name: Laura Jamil  MRN: [id number]  Encounter Date: 1/4/2024     SUBJECTIVE:  Laura Jamil is a 58 y.o., female being seen today regarding   Chief Complaint   Patient presents with    Chronic Pain     2 mo f/u chronic pain (cervical, fibro.).    and routine medication management.    Functionality Assessment & Goals:  On a scale of 0 (Does not Interfere) to 10 (Completely Interferes)  Which number describes how during the past week pain has interfered with the following:   A.  General Activity: ***    B.  Mood:  ***   C.  Walking Ability:  ***   D.  Normal Work (Includes both work outside the home and housework):  ***   E.  Relations with Other People:  ***   F.  Sleep:  ***    G.  Enjoyment of Life:  ***  2.  Patient prefers to Take their Pain Medications:   [] On a regular basis   [] Only when necessary   [] Does not take pain medications  3.  What are the Patient's Goals/ Expectations for Visiting Pain Management?   [] Learn about my pain   [] Physical Therapy   [] Receive Injections   [] Deal with Anxiety and Stress   [] Receive Medication   [] Treat Depression    [] Treat Sleep   [] Treat Opioid Dependence/ Addiction    Recent Imaging since last appointment related to chief complaint:  ***  Recent Interventional Procedures since last appointment related to chief complaint:  ***  Patient reports a *** % improvement of pain and ADL's after procedure.    Most recent Oswestry Disability Questionnaire completed by patient on ***     Current Pain Assessment:         10/11/2023     1:29 PM   AMB PAIN ASSESSMENT   Location of Pain Neck   Location Modifiers Dorsal   Severity of Pain 7   Quality of Pain Throbbing;Sharp;Dull;Aching   Duration of Pain Persistent   Frequency of Pain Constant   Limiting Behavior Yes   Relieving Factors Heat;Ice;Nsaids   Result of Injury No   Work-Related Injury No   Are there other pain 
Pt verified name and  on urine specimen label.  Pt's verified label then affixed to urine specimen container.  Pt's labeled urine then taken to lab for testing.      
Pt verified name and  on urine specimen label.  Pt's verified label then affixed to urine specimen container.  Pt's labeled urine then taken to lab for testing.      
identified.;Obtaining appropriate analgesic effect of treatment.         Christina Conrad, APRN - CNP  
Oriented - self; Oriented - place; Oriented - time

## 2024-01-09 LAB
6-ACETYLMORPHINE, UR: NOT DETECTED
7-AMINOCLONAZEPAM, URINE: NOT DETECTED
ALPHA-OH-ALPRAZ, URINE: NOT DETECTED
ALPHA-OH-MIDAZOLAM, URINE: NOT DETECTED
ALPRAZOLAM, URINE: NOT DETECTED
AMPHETAMINES, URINE: NOT DETECTED
BARBITURATES, URINE: NEGATIVE
BENZOYLECGONINE, UR: NEGATIVE
BUPRENORPHINE URINE: NOT DETECTED
CARISOPRODOL, UR: NEGATIVE
CLONAZEPAM, URINE: NOT DETECTED
CODEINE, URINE: NOT DETECTED
CREAT UR-MCNC: 37.5 MG/DL (ref 20–400)
DIAZEPAM, URINE: NOT DETECTED
EER PAIN MGT DRUG PANEL, HIGH RES/EMIT U: NORMAL
ETHYL GLUCURONIDE UR: NORMAL
FENTANYL URINE: NOT DETECTED
GABAPENTIN: NOT DETECTED
HYDROCODONE, OPI6M: NOT DETECTED
HYDROMORPHONE, OPI3M: NOT DETECTED
LORAZEPAM, URINE: NOT DETECTED
MARIJUANA METAB, UR: NEGATIVE
MDA, UR: NOT DETECTED
MDEA, EVE, UR: NOT DETECTED
MDMA URINE: NOT DETECTED
MEPERIDINE METAB, UR: NOT DETECTED
METHADONE, URINE: NEGATIVE
METHAMPHETAMINE, URINE: NOT DETECTED
METHYLPHENIDATE: NOT DETECTED
MIDAZOLAM, URINE: NOT DETECTED
MORPHINE, OPI1M: NOT DETECTED
NALOXONE URINE: NOT DETECTED
NORBUPRENORPHINE, URINE: NOT DETECTED
NORDIAZEPAM, URINE: NOT DETECTED
NORFENTANYL, URINE: NOT DETECTED
NORHYDROCODONE, URINE: NOT DETECTED
NOROXYCODONE, URINE: PRESENT
NOROXYMORPHONE, URINE: PRESENT
OXAZEPAM, URINE: NOT DETECTED
OXYCODONE URINE: PRESENT
OXYMORPHONE, URINE: PRESENT
PAIN MGT DRUG PANEL, HI RES, UR: NORMAL
PCP,URINE: NEGATIVE
PHENTERMINE, UR: NOT DETECTED
PREGABALIN: NOT DETECTED
TAPENTADOL, URINE: NOT DETECTED
TAPENTADOL-O-SULFATE, URINE: NOT DETECTED
TEMAZEPAM, URINE: NOT DETECTED
TRAMADOL, URINE: NEGATIVE
ZOLPIDEM METABOLITE (ZCA), URINE: NOT DETECTED
ZOLPIDEM, URINE: NOT DETECTED

## 2024-01-29 DIAGNOSIS — G89.29 CHRONIC PAIN OF MULTIPLE JOINTS: ICD-10-CM

## 2024-01-29 DIAGNOSIS — M77.11 RIGHT LATERAL EPICONDYLITIS: ICD-10-CM

## 2024-01-29 DIAGNOSIS — M79.7 FIBROMYALGIA: ICD-10-CM

## 2024-01-29 DIAGNOSIS — M25.50 CHRONIC PAIN OF MULTIPLE JOINTS: ICD-10-CM

## 2024-01-29 NOTE — TELEPHONE ENCOUNTER
Patient called refill line for their   Percocet  mg  [facility] pharmacy       Last Appt:  1/4/2024  Next Appt:   2/2/2024  Med verified in [facility]    Last DS11: 1/4/24    OARRS Report checked for [place], [place], and [place]:  Last filled 1/4/24  Due 2/3/24

## 2024-02-01 RX ORDER — OXYCODONE AND ACETAMINOPHEN 10; 325 MG/1; MG/1
1 TABLET ORAL EVERY 6 HOURS PRN
Qty: 120 TABLET | Refills: 0 | Status: SHIPPED | OUTPATIENT
Start: 2024-02-03 | End: 2024-03-04

## 2024-02-02 ENCOUNTER — OFFICE VISIT (OUTPATIENT)
Dept: PAIN MANAGEMENT | Age: 59
End: 2024-02-02
Payer: MEDICARE

## 2024-02-02 VITALS
HEIGHT: 63 IN | DIASTOLIC BLOOD PRESSURE: 100 MMHG | BODY MASS INDEX: 27.11 KG/M2 | WEIGHT: 153 LBS | OXYGEN SATURATION: 98 % | RESPIRATION RATE: 17 BRPM | SYSTOLIC BLOOD PRESSURE: 150 MMHG | HEART RATE: 86 BPM

## 2024-02-02 DIAGNOSIS — M54.2 CERVICALGIA: ICD-10-CM

## 2024-02-02 DIAGNOSIS — G89.29 CHRONIC PAIN OF MULTIPLE JOINTS: ICD-10-CM

## 2024-02-02 DIAGNOSIS — M79.7 FIBROMYALGIA: ICD-10-CM

## 2024-02-02 DIAGNOSIS — M77.11 RIGHT LATERAL EPICONDYLITIS: ICD-10-CM

## 2024-02-02 DIAGNOSIS — M79.18 MYOFASCIAL PAIN: Primary | ICD-10-CM

## 2024-02-02 DIAGNOSIS — M25.50 CHRONIC PAIN OF MULTIPLE JOINTS: ICD-10-CM

## 2024-02-02 PROCEDURE — 99213 OFFICE O/P EST LOW 20 MIN: CPT | Performed by: NURSE PRACTITIONER

## 2024-02-02 ASSESSMENT — ENCOUNTER SYMPTOMS: BACK PAIN: 1

## 2024-02-02 NOTE — PROGRESS NOTES
clubbing.   Neurological:      Mental Status: She is alert and oriented to person, place, and time. She is not disoriented.      GCS: GCS eye subscore is 4. GCS verbal subscore is 5. GCS motor subscore is 6.      Cranial Nerves: No cranial nerve deficit.   Psychiatric:         Mood and Affect: Mood and affect normal.         Speech: She is communicative.         Behavior: Behavior normal. Behavior is not agitated, aggressive or withdrawn. Behavior is cooperative.         Cognition and Memory: Cognition and memory normal.         Judgment: Judgment normal. Judgment is not impulsive or inappropriate.          ASSESSMENT:    ICD-10-CM    1. Myofascial pain  M79.18       2. Chronic pain of multiple joints  M25.50     G89.29       3. Fibromyalgia  M79.7       4. Right lateral epicondylitis  M77.11       5. Cervicalgia  M54.2            I have reviewed the chief complaint and the history of present illness, vitals and all subjective documentation by [facility] clinical staff.    PLAN:  Laura Jamil is here for a recheck of chronic pain and medication management.  Medications:  The current medication plan   [x] is controlling pain and providing improvement with quality of life and function and will continue current regimen    The patient shows no evidence of misuse or diversion of medications and denies use of alcohol or illegal substances.  A urine drug screen has been appropriate as shown with the most recent screen.    Controlled Substance Monitoring:    Acute and Chronic Pain Monitoring:   RX Monitoring Periodic Controlled Substance Monitoring   2/2/2024   2:59 PM No signs of potential drug abuse or diversion identified.;Obtaining appropriate analgesic effect of treatment.     Follow-up Plan:  Schedule patient to follow up with our office for recheck monthly     JACKLYN Romero - CNP  Pain Management  [facility]

## 2024-02-05 DIAGNOSIS — G89.29 CHRONIC PAIN OF MULTIPLE JOINTS: ICD-10-CM

## 2024-02-05 DIAGNOSIS — M77.11 RIGHT LATERAL EPICONDYLITIS: ICD-10-CM

## 2024-02-05 DIAGNOSIS — M79.7 FIBROMYALGIA: ICD-10-CM

## 2024-02-05 DIAGNOSIS — M25.50 CHRONIC PAIN OF MULTIPLE JOINTS: ICD-10-CM

## 2024-02-05 RX ORDER — OXYCODONE AND ACETAMINOPHEN 10; 325 MG/1; MG/1
1 TABLET ORAL EVERY 6 HOURS PRN
Qty: 120 TABLET | Refills: 0 | Status: CANCELLED | OUTPATIENT
Start: 2024-02-05 | End: 2024-03-06

## 2024-02-05 NOTE — PROGRESS NOTES
Pt is out and [facility]   Was sent to [facility] pharm which has been removed from pt list of pharmacies.  Pt is out of this medication

## 2024-02-26 DIAGNOSIS — M25.50 CHRONIC PAIN OF MULTIPLE JOINTS: ICD-10-CM

## 2024-02-26 DIAGNOSIS — M77.11 RIGHT LATERAL EPICONDYLITIS: ICD-10-CM

## 2024-02-26 DIAGNOSIS — M79.7 FIBROMYALGIA: ICD-10-CM

## 2024-02-26 DIAGNOSIS — G89.29 CHRONIC PAIN OF MULTIPLE JOINTS: ICD-10-CM

## 2024-02-26 RX ORDER — OXYCODONE AND ACETAMINOPHEN 10; 325 MG/1; MG/1
1 TABLET ORAL EVERY 6 HOURS PRN
Qty: 120 TABLET | Refills: 0 | Status: CANCELLED | OUTPATIENT
Start: 2024-03-06 | End: 2024-04-05

## 2024-02-26 NOTE — TELEPHONE ENCOUNTER
Laura called requesting a refill of the below medication which has been pended for you:     Requested Prescriptions     Pending Prescriptions Disp Refills    oxyCODONE-acetaminophen (PERCOCET)  MG per tablet 120 tablet 0     Sig: Take 1 tablet by mouth every 6 hours as needed for Pain for up to 30 days. Intended supply: 30 days       Last Appointment Date: 2/2/2024  Next Appointment Date: 3/4/2024    No Known Allergies    Pharmacy:  [facility]    OARRS Report checked for [place], [place], and [place]:Percocet 10/325  2/5/24 #120. Due 3/6/24.

## 2024-03-04 ENCOUNTER — OFFICE VISIT (OUTPATIENT)
Dept: PAIN MANAGEMENT | Age: 59
End: 2024-03-04
Payer: MEDICARE

## 2024-03-04 VITALS
HEART RATE: 82 BPM | BODY MASS INDEX: 26.57 KG/M2 | WEIGHT: 150 LBS | SYSTOLIC BLOOD PRESSURE: 142 MMHG | DIASTOLIC BLOOD PRESSURE: 100 MMHG | OXYGEN SATURATION: 98 %

## 2024-03-04 DIAGNOSIS — M77.11 RIGHT LATERAL EPICONDYLITIS: ICD-10-CM

## 2024-03-04 DIAGNOSIS — M54.2 CERVICALGIA: ICD-10-CM

## 2024-03-04 DIAGNOSIS — M79.18 MYOFASCIAL PAIN: Primary | ICD-10-CM

## 2024-03-04 DIAGNOSIS — M79.7 FIBROMYALGIA: ICD-10-CM

## 2024-03-04 DIAGNOSIS — G89.29 CHRONIC PAIN OF MULTIPLE JOINTS: ICD-10-CM

## 2024-03-04 DIAGNOSIS — M25.50 CHRONIC PAIN OF MULTIPLE JOINTS: ICD-10-CM

## 2024-03-04 PROCEDURE — 99214 OFFICE O/P EST MOD 30 MIN: CPT | Performed by: NURSE PRACTITIONER

## 2024-03-04 RX ORDER — OXYCODONE AND ACETAMINOPHEN 10; 325 MG/1; MG/1
1 TABLET ORAL EVERY 6 HOURS PRN
Qty: 120 TABLET | Refills: 0 | Status: SHIPPED | OUTPATIENT
Start: 2024-03-04 | End: 2024-04-03

## 2024-03-04 NOTE — PROGRESS NOTES
communicative.         Behavior: Behavior normal. Behavior is not agitated, aggressive or withdrawn. Behavior is cooperative.         Cognition and Memory: Cognition and memory normal.         Judgment: Judgment normal. Judgment is not impulsive or inappropriate.          ASSESSMENT:    ICD-10-CM    1. Myofascial pain  M79.18       2. Fibromyalgia  M79.7       3. Chronic pain of multiple joints  M25.50     G89.29       4. Cervicalgia  M54.2            I have reviewed the chief complaint and the history of present illness, vitals and all subjective documentation by [facility] clinical staff.    PLAN:  Laura KRZYSZTOF SheppardJamil is here for a recheck of chronic pain and medication management.  Medications:  The current medication plan   [x] is controlling pain and providing improvement with quality of life and function and will continue current regimen    The patient shows no evidence of misuse or diversion of medications and denies use of alcohol or illegal substances.  A urine drug screen has been appropriate as shown with the most recent screen.    Controlled Substance Monitoring:    Acute and Chronic Pain Monitoring:   RX Monitoring Periodic Controlled Substance Monitoring   3/4/2024   2:48 PM No signs of potential drug abuse or diversion identified.;Obtaining appropriate analgesic effect of treatment.     Follow-up Plan:  Schedule patient to follow up with our office for recheck monthly     JACKLYN Romero - CNP  Pain Management  [facility]

## 2024-03-28 DIAGNOSIS — M79.7 FIBROMYALGIA: ICD-10-CM

## 2024-03-28 DIAGNOSIS — M77.11 RIGHT LATERAL EPICONDYLITIS: ICD-10-CM

## 2024-03-28 DIAGNOSIS — M25.50 CHRONIC PAIN OF MULTIPLE JOINTS: ICD-10-CM

## 2024-03-28 DIAGNOSIS — G89.29 CHRONIC PAIN OF MULTIPLE JOINTS: ICD-10-CM

## 2024-03-28 NOTE — TELEPHONE ENCOUNTER
Patient called refill line for their    Percocet  mg  [facility]       Last Appt:  3/4/2024  Next Appt:   4/5/2024  Med verified in [facility]    Last DS11: 1/4/24      OARRS Report checked for [place], [place], and [place]  Last filled 3/6/24  Due 4/5/24

## 2024-03-30 RX ORDER — OXYCODONE AND ACETAMINOPHEN 10; 325 MG/1; MG/1
1 TABLET ORAL EVERY 6 HOURS PRN
Qty: 120 TABLET | Refills: 0 | Status: SHIPPED | OUTPATIENT
Start: 2024-04-05 | End: 2024-05-05

## 2024-04-05 ENCOUNTER — OFFICE VISIT (OUTPATIENT)
Dept: PAIN MANAGEMENT | Age: 59
End: 2024-04-05
Payer: MEDICARE

## 2024-04-05 VITALS
HEIGHT: 63 IN | BODY MASS INDEX: 26.93 KG/M2 | DIASTOLIC BLOOD PRESSURE: 102 MMHG | SYSTOLIC BLOOD PRESSURE: 148 MMHG | WEIGHT: 152 LBS

## 2024-04-05 DIAGNOSIS — M79.18 MYOFASCIAL PAIN: Primary | ICD-10-CM

## 2024-04-05 DIAGNOSIS — M79.7 FIBROMYALGIA: ICD-10-CM

## 2024-04-05 DIAGNOSIS — M77.11 RIGHT LATERAL EPICONDYLITIS: ICD-10-CM

## 2024-04-05 DIAGNOSIS — G89.29 CHRONIC PAIN OF MULTIPLE JOINTS: ICD-10-CM

## 2024-04-05 DIAGNOSIS — M25.50 CHRONIC PAIN OF MULTIPLE JOINTS: ICD-10-CM

## 2024-04-05 PROCEDURE — 99213 OFFICE O/P EST LOW 20 MIN: CPT | Performed by: NURSE PRACTITIONER

## 2024-04-05 ASSESSMENT — ENCOUNTER SYMPTOMS: BACK PAIN: 1

## 2024-04-05 NOTE — PROGRESS NOTES
[facility] Pain Management  [address] [place]. [address]    Patient Name: Laura Jamil  MRN: [id number]  Encounter Date: 4/5/2024     SUBJECTIVE:  Laura Jamil is a 58 y.o., female being seen today regarding   Chief Complaint   Patient presents with    1 Month Follow-Up    and routine medication management.    Functionality Assessment & Goals:  On a scale of 0 (Does not Interfere) to 10 (Completely Interferes)  Which number describes how during the past week pain has interfered with the following:   A.  General Activity: 8    B.  Mood:  6   C.  Walking Ability:  5   D.  Normal Work (Includes both work outside the home and housework):  8   E.  Relations with Other People:  8   F.  Sleep:  10    G.  Enjoyment of Life:  6  2.  Patient prefers to Take their Pain Medications:   [x] On a regular basis   [] Only when necessary   [] Does not take pain medications  3.  What are the Patient's Goals/ Expectations for Visiting Pain Management?   [] Learn about my pain   [] Physical Therapy   [x] Receive Injections   [] Deal with Anxiety and Stress   [x] Receive Medication   [] Treat Depression    [] Treat Sleep   [] Treat Opioid Dependence/ Addiction    Current Pain Assessment:         4/5/2024     3:32 PM   AMB PAIN ASSESSMENT   Location of Pain Other (Comment)   Location Modifiers Other (Comment)   Severity of Pain 7   Quality of Pain Popping;Cracking;Throbbing;Sharp;Dull;Aching   Duration of Pain Persistent   Frequency of Pain Constant   Aggravating Factors Bending;Stretching;Straightening;Exercise;Kneeling;Squatting;Standing;Walking;Stairs   Limiting Behavior Some   Relieving Factors Rest;Ice;Heat;Exercise;Other (Comment)   Result of Injury No   Work-Related Injury No   Are there other pain locations you wish to document? No        Current Medications & Allergies:   Current Outpatient Medications   Medication Instructions    baclofen (LIORESAL) 10 mg, Oral, 3 TIMES DAILY    busPIRone

## 2024-05-03 ENCOUNTER — OFFICE VISIT (OUTPATIENT)
Dept: PAIN MANAGEMENT | Age: 59
End: 2024-05-03
Payer: MEDICARE

## 2024-05-03 VITALS
RESPIRATION RATE: 12 BRPM | DIASTOLIC BLOOD PRESSURE: 120 MMHG | SYSTOLIC BLOOD PRESSURE: 160 MMHG | WEIGHT: 151 LBS | OXYGEN SATURATION: 99 % | HEART RATE: 79 BPM | HEIGHT: 63 IN | BODY MASS INDEX: 26.75 KG/M2

## 2024-05-03 DIAGNOSIS — G89.29 CHRONIC PAIN OF MULTIPLE JOINTS: ICD-10-CM

## 2024-05-03 DIAGNOSIS — M54.2 CERVICALGIA: ICD-10-CM

## 2024-05-03 DIAGNOSIS — M77.11 RIGHT LATERAL EPICONDYLITIS: ICD-10-CM

## 2024-05-03 DIAGNOSIS — M79.18 MYOFASCIAL PAIN: Primary | ICD-10-CM

## 2024-05-03 DIAGNOSIS — M25.50 CHRONIC PAIN OF MULTIPLE JOINTS: ICD-10-CM

## 2024-05-03 DIAGNOSIS — M79.7 FIBROMYALGIA: ICD-10-CM

## 2024-05-03 PROCEDURE — 99213 OFFICE O/P EST LOW 20 MIN: CPT | Performed by: NURSE PRACTITIONER

## 2024-05-03 PROCEDURE — 99214 OFFICE O/P EST MOD 30 MIN: CPT | Performed by: NURSE PRACTITIONER

## 2024-05-03 RX ORDER — LISINOPRIL 10 MG/1
10 TABLET ORAL DAILY
Qty: 90 TABLET | Refills: 1 | Status: SHIPPED | OUTPATIENT
Start: 2024-05-03

## 2024-05-03 RX ORDER — OXYCODONE AND ACETAMINOPHEN 10; 325 MG/1; MG/1
1 TABLET ORAL EVERY 6 HOURS PRN
Qty: 120 TABLET | Refills: 0 | Status: SHIPPED | OUTPATIENT
Start: 2024-05-04 | End: 2024-06-03

## 2024-05-03 ASSESSMENT — ENCOUNTER SYMPTOMS
RESPIRATORY NEGATIVE: 1
BACK PAIN: 1

## 2024-05-03 NOTE — PROGRESS NOTES
[facility] Pain Management  [address] [place]. [address]    Patient Name: Laura Jamil  MRN: [id number]  Encounter Date: 5/3/2024     SUBJECTIVE:  Laura Jamil is a 58 y.o., female being seen today regarding   Chief Complaint   Patient presents with    1 Month Follow-Up     Myofascial pain    and routine medication management.    Functionality Assessment & Goals:  On a scale of 0 (Does not Interfere) to 10 (Completely Interferes)  Which number describes how during the past week pain has interfered with the following:   A.  General Activity: 8    B.  Mood:  6   C.  Walking Ability:  5   D.  Normal Work (Includes both work outside the home and housework):  8   E.  Relations with Other People:  8   F.  Sleep:  10    G.  Enjoyment of Life:  6  2.  Patient prefers to Take their Pain Medications:   [x] On a regular basis   [] Only when necessary   [] Does not take pain medications  3.  What are the Patient's Goals/ Expectations for Visiting Pain Management?   [] Learn about my pain   [] Physical Therapy   [x] Receive Injections   [] Deal with Anxiety and Stress   [x] Receive Medication   [] Treat Depression    [] Treat Sleep   [] Treat Opioid Dependence/ Addiction    Current Pain Assessment:         5/3/2024     2:41 PM   AMB PAIN ASSESSMENT   Location of Pain Other (Comment)   Severity of Pain 7   Quality of Pain Throbbing;Sharp;Dull;Aching   Duration of Pain Persistent   Frequency of Pain Constant   Aggravating Factors Bending;Stretching;Straightening;Kneeling;Squatting;Standing;Walking;Stairs   Limiting Behavior Yes   Relieving Factors Rest;Ice;Heat   Result of Injury No   Work-Related Injury No   Are there other pain locations you wish to document? No        Current Medications & Allergies:   Current Outpatient Medications   Medication Instructions    baclofen (LIORESAL) 10 mg, Oral, 3 TIMES DAILY    busPIRone (BUSPAR) 15 MG tablet TAKE 1 TABLET BY MOUTH 2 TIMES A DAY AS NEEDED FOR

## 2024-05-28 DIAGNOSIS — G89.29 CHRONIC PAIN OF MULTIPLE JOINTS: ICD-10-CM

## 2024-05-28 DIAGNOSIS — M79.7 FIBROMYALGIA: ICD-10-CM

## 2024-05-28 DIAGNOSIS — M25.50 CHRONIC PAIN OF MULTIPLE JOINTS: ICD-10-CM

## 2024-05-28 DIAGNOSIS — M77.11 RIGHT LATERAL EPICONDYLITIS: ICD-10-CM

## 2024-05-28 RX ORDER — OXYCODONE AND ACETAMINOPHEN 10; 325 MG/1; MG/1
1 TABLET ORAL EVERY 6 HOURS PRN
Qty: 120 TABLET | Refills: 0 | Status: SHIPPED | OUTPATIENT
Start: 2024-06-03 | End: 2024-07-03

## 2024-05-28 NOTE — TELEPHONE ENCOUNTER
Percocet  mg  Ds11 1/4/24  [facility]   Last filled 5/4/24  Due 6/3/24  Last Appt:  5/3/2024  Next Appt:   6/3/2024  Med verified in Epic

## 2024-06-03 ENCOUNTER — OFFICE VISIT (OUTPATIENT)
Dept: PAIN MANAGEMENT | Age: 59
End: 2024-06-03
Payer: MEDICARE

## 2024-06-03 VITALS
OXYGEN SATURATION: 98 % | HEART RATE: 91 BPM | SYSTOLIC BLOOD PRESSURE: 130 MMHG | BODY MASS INDEX: 26.75 KG/M2 | WEIGHT: 151 LBS | DIASTOLIC BLOOD PRESSURE: 80 MMHG

## 2024-06-03 DIAGNOSIS — M79.18 MYOFASCIAL PAIN: ICD-10-CM

## 2024-06-03 DIAGNOSIS — M25.50 CHRONIC PAIN OF MULTIPLE JOINTS: Primary | ICD-10-CM

## 2024-06-03 DIAGNOSIS — M54.2 CERVICALGIA: ICD-10-CM

## 2024-06-03 DIAGNOSIS — M79.7 FIBROMYALGIA: ICD-10-CM

## 2024-06-03 DIAGNOSIS — G89.29 CHRONIC PAIN OF MULTIPLE JOINTS: Primary | ICD-10-CM

## 2024-06-03 PROCEDURE — 99213 OFFICE O/P EST LOW 20 MIN: CPT | Performed by: NURSE PRACTITIONER

## 2024-06-03 PROCEDURE — 99214 OFFICE O/P EST MOD 30 MIN: CPT | Performed by: NURSE PRACTITIONER

## 2024-06-03 RX ORDER — LISINOPRIL AND HYDROCHLOROTHIAZIDE 20; 12.5 MG/1; MG/1
TABLET ORAL
COMMUNITY
Start: 2024-05-13

## 2024-06-03 ASSESSMENT — ENCOUNTER SYMPTOMS
RESPIRATORY NEGATIVE: 1
BACK PAIN: 1

## 2024-06-03 NOTE — PROGRESS NOTES
[facility] Pain Management  [address] [place]. [address]    Patient Name: Laura Jamil  MRN: [id number]  Encounter Date: 6/3/2024     SUBJECTIVE:  Laura Jamil is a 58 y.o., female being seen today regarding   Chief Complaint   Patient presents with    myofascial    and routine medication management.    Functionality Assessment & Goals:  On a scale of 0 (Does not Interfere) to 10 (Completely Interferes)  Which number describes how during the past week pain has interfered with the following:   A.  General Activity: 8    B.  Mood:  6   C.  Walking Ability:  5   D.  Normal Work (Includes both work outside the home and housework):  8   E.  Relations with Other People:  8   F.  Sleep:  10    G.  Enjoyment of Life:  6  2.  Patient prefers to Take their Pain Medications:   [x] On a regular basis   [] Only when necessary   [] Does not take pain medications  3.  What are the Patient's Goals/ Expectations for Visiting Pain Management?   [] Learn about my pain   [] Physical Therapy   [x] Receive Injections   [] Deal with Anxiety and Stress   [x] Receive Medication   [] Treat Depression    [] Treat Sleep   [] Treat Opioid Dependence/ Addiction    Current Pain Assessment:         6/3/2024     1:44 PM   AMB PAIN ASSESSMENT   Severity of Pain 7   Quality of Pain Throbbing;Sharp;Dull;Aching;Locking;Grinding;Popping;Cracking;Buckling   Duration of Pain Persistent   Frequency of Pain Constant   Aggravating Factors Bending;Stretching;Straightening;Exercise;Kneeling;Squatting;Standing;Walking;Stairs   Limiting Behavior Yes   Relieving Factors Rest;Ice;Heat   Result of Injury No   Work-Related Injury No        Current Medications & Allergies:   Current Outpatient Medications   Medication Instructions    baclofen (LIORESAL) 10 mg, Oral, 3 TIMES DAILY    busPIRone (BUSPAR) 15 MG tablet TAKE 1 TABLET BY MOUTH 2 TIMES A DAY AS NEEDED FOR ANXIETY.    desvenlafaxine succinate (PRISTIQ) 100 mg, Oral, DAILY

## 2024-07-01 DIAGNOSIS — M77.11 RIGHT LATERAL EPICONDYLITIS: ICD-10-CM

## 2024-07-01 DIAGNOSIS — G89.29 CHRONIC PAIN OF MULTIPLE JOINTS: ICD-10-CM

## 2024-07-01 DIAGNOSIS — M79.7 FIBROMYALGIA: ICD-10-CM

## 2024-07-01 DIAGNOSIS — M25.50 CHRONIC PAIN OF MULTIPLE JOINTS: ICD-10-CM

## 2024-07-01 RX ORDER — OXYCODONE AND ACETAMINOPHEN 10; 325 MG/1; MG/1
1 TABLET ORAL EVERY 6 HOURS PRN
Qty: 120 TABLET | Refills: 0 | Status: SHIPPED | OUTPATIENT
Start: 2024-07-03 | End: 2024-08-02

## 2024-07-01 NOTE — TELEPHONE ENCOUNTER
Percocet  mg  DS11 1/4/24  [facility]  Last filled 6/3/24  Due 7/3/24  Last Appt:   6/3/2024  Next Appt:   7/12/2024  Med verified in Epic

## 2024-07-30 DIAGNOSIS — M79.7 FIBROMYALGIA: ICD-10-CM

## 2024-07-30 DIAGNOSIS — M25.50 CHRONIC PAIN OF MULTIPLE JOINTS: ICD-10-CM

## 2024-07-30 DIAGNOSIS — M77.11 RIGHT LATERAL EPICONDYLITIS: ICD-10-CM

## 2024-07-30 DIAGNOSIS — G89.29 CHRONIC PAIN OF MULTIPLE JOINTS: ICD-10-CM

## 2024-07-30 RX ORDER — OXYCODONE AND ACETAMINOPHEN 10; 325 MG/1; MG/1
1 TABLET ORAL EVERY 6 HOURS PRN
Qty: 120 TABLET | Refills: 0 | Status: SHIPPED | OUTPATIENT
Start: 2024-08-02 | End: 2024-09-01

## 2024-07-30 NOTE — TELEPHONE ENCOUNTER
Laura called requesting a refill of the below medication which has been pended for you:     Requested Prescriptions     Pending Prescriptions Disp Refills    oxyCODONE-acetaminophen (PERCOCET)  MG per tablet 120 tablet 0     Sig: Take 1 tablet by mouth every 6 hours as needed for Pain for up to 30 days. Intended supply: 30 days       Last Appointment Date: 6/3/2024  Next Appointment Date: 8/14/2024    No Known Allergies    Pharmacy:  [facility] Report checked for [place], [place], and [place]:Percocet 10/325  7/3/24 #120. Due 8/2/24.

## 2024-08-28 DIAGNOSIS — M25.50 CHRONIC PAIN OF MULTIPLE JOINTS: ICD-10-CM

## 2024-08-28 DIAGNOSIS — G89.29 CHRONIC PAIN OF MULTIPLE JOINTS: ICD-10-CM

## 2024-08-28 DIAGNOSIS — M77.11 RIGHT LATERAL EPICONDYLITIS: ICD-10-CM

## 2024-08-28 DIAGNOSIS — M79.7 FIBROMYALGIA: ICD-10-CM

## 2024-08-28 RX ORDER — OXYCODONE AND ACETAMINOPHEN 10; 325 MG/1; MG/1
1 TABLET ORAL EVERY 6 HOURS PRN
Qty: 120 TABLET | Refills: 0 | Status: SHIPPED | OUTPATIENT
Start: 2024-08-31 | End: 2024-09-30

## 2024-08-28 NOTE — TELEPHONE ENCOUNTER
Percocet  mg  DS11 1/4/24  [facility]  Last filled 8/2/24  Due 9/1/24 fill 8/31/24  Last Appt:  6/3/2024  Next Appt:   9/12/2024  Med verified in Epic

## 2024-09-12 ENCOUNTER — OFFICE VISIT (OUTPATIENT)
Dept: PAIN MANAGEMENT | Age: 59
End: 2024-09-12
Payer: MEDICARE

## 2024-09-12 ENCOUNTER — HOSPITAL ENCOUNTER (OUTPATIENT)
Age: 59
Setting detail: SPECIMEN
Discharge: HOME | End: 2024-09-12
Payer: MEDICARE

## 2024-09-12 VITALS
OXYGEN SATURATION: 99 % | DIASTOLIC BLOOD PRESSURE: 94 MMHG | SYSTOLIC BLOOD PRESSURE: 134 MMHG | WEIGHT: 151 LBS | RESPIRATION RATE: 17 BRPM | HEART RATE: 80 BPM | HEIGHT: 63 IN | BODY MASS INDEX: 26.75 KG/M2

## 2024-09-12 DIAGNOSIS — M79.7 FIBROMYALGIA: ICD-10-CM

## 2024-09-12 DIAGNOSIS — Z02.83 ENCOUNTER FOR DRUG SCREENING: ICD-10-CM

## 2024-09-12 DIAGNOSIS — M25.50 CHRONIC PAIN OF MULTIPLE JOINTS: ICD-10-CM

## 2024-09-12 DIAGNOSIS — M54.2 CERVICALGIA: ICD-10-CM

## 2024-09-12 DIAGNOSIS — Z79.891 ENCOUNTER FOR LONG-TERM OPIATE ANALGESIC USE: ICD-10-CM

## 2024-09-12 DIAGNOSIS — Z02.83 ENCOUNTER FOR DRUG SCREENING: Primary | ICD-10-CM

## 2024-09-12 DIAGNOSIS — G89.29 CHRONIC PAIN OF MULTIPLE JOINTS: ICD-10-CM

## 2024-09-12 DIAGNOSIS — M79.18 MYOFASCIAL PAIN: ICD-10-CM

## 2024-09-12 PROCEDURE — 99213 OFFICE O/P EST LOW 20 MIN: CPT | Performed by: NURSE PRACTITIONER

## 2024-09-12 PROCEDURE — G0481 DRUG TEST DEF 8-14 CLASSES: HCPCS

## 2024-09-12 PROCEDURE — 80307 DRUG TEST PRSMV CHEM ANLYZR: CPT

## 2024-09-12 RX ORDER — BACLOFEN 10 MG/1
10 TABLET ORAL 3 TIMES DAILY
Qty: 270 TABLET | Refills: 3 | Status: SHIPPED | OUTPATIENT
Start: 2024-09-12

## 2024-09-12 ASSESSMENT — ENCOUNTER SYMPTOMS
BACK PAIN: 1
RESPIRATORY NEGATIVE: 1

## 2024-09-17 LAB
6-ACETYLMORPHINE, UR: NOT DETECTED
7-AMINOCLONAZEPAM, URINE: NOT DETECTED
ALPHA-OH-ALPRAZ, URINE: NOT DETECTED
ALPHA-OH-MIDAZOLAM, URINE: NOT DETECTED
ALPRAZOLAM, URINE: NOT DETECTED
AMPHETAMINE, URINE: NOT DETECTED
BARBITURATES, URINE: NEGATIVE
BENZOYLECGONINE, UR: NEGATIVE
BUPRENORPHINE URINE: NOT DETECTED
CARISOPRODOL, UR: NEGATIVE
CLONAZEPAM, URINE: NOT DETECTED
CODEINE, URINE: NOT DETECTED
CREAT UR-MCNC: 34.8 MG/DL (ref 20–400)
DIAZEPAM, URINE: NOT DETECTED
EER PAIN MGT DRUG PANEL, HIGH RES/EMIT U: NORMAL
ETHYL GLUCURONIDE UR: NORMAL
FENTANYL URINE: NOT DETECTED
GABAPENTIN: NOT DETECTED
HYDROCODONE, URINE: NOT DETECTED
HYDROMORPHONE, URINE: NOT DETECTED
LORAZEPAM, URINE: NOT DETECTED
MARIJUANA METAB, UR: NEGATIVE
MDA, URINE: NOT DETECTED
MDEA, EVE, UR: NOT DETECTED
MDMA, URINE: NOT DETECTED
MEPERIDINE METAB, UR: NOT DETECTED
METHADONE, URINE: NEGATIVE
METHAMPHETAMINE, URINE: NOT DETECTED
METHYLPHENIDATE: NOT DETECTED
MIDAZOLAM, URINE: NOT DETECTED
MORPHINE, OPI1M: NOT DETECTED
NALOXONE URINE: NOT DETECTED
NORBUPRENORPHINE, URINE: NOT DETECTED
NORDIAZEPAM, URINE: NOT DETECTED
NORFENTANYL, URINE: NOT DETECTED
NORHYDROCODONE, URINE: NOT DETECTED
NOROXYCODONE, URINE: PRESENT
NOROXYMORPHONE, URINE: PRESENT
OXAZEPAM, URINE: NOT DETECTED
OXYCODONE URINE: PRESENT
OXYMORPHONE, URINE: PRESENT
PAIN MGT DRUG PANEL, HI RES, UR: NORMAL
PCP,URINE: NEGATIVE
PHENTERMINE, UR: NOT DETECTED
PREGABALIN: NOT DETECTED
TAPENTADOL, URINE: NOT DETECTED
TAPENTADOL-O-SULFATE, URINE: NOT DETECTED
TEMAZEPAM, URINE: NOT DETECTED
TRAMADOL, URINE: NEGATIVE
ZOLPIDEM METABOLITE (ZCA), URINE: NOT DETECTED
ZOLPIDEM, URINE: NOT DETECTED

## 2024-09-26 DIAGNOSIS — M25.50 CHRONIC PAIN OF MULTIPLE JOINTS: ICD-10-CM

## 2024-09-26 DIAGNOSIS — M79.7 FIBROMYALGIA: ICD-10-CM

## 2024-09-26 DIAGNOSIS — G89.29 CHRONIC PAIN OF MULTIPLE JOINTS: ICD-10-CM

## 2024-09-26 DIAGNOSIS — M77.11 RIGHT LATERAL EPICONDYLITIS: ICD-10-CM

## 2024-09-26 RX ORDER — OXYCODONE AND ACETAMINOPHEN 10; 325 MG/1; MG/1
1 TABLET ORAL EVERY 6 HOURS PRN
Qty: 120 TABLET | Refills: 0 | Status: SHIPPED | OUTPATIENT
Start: 2024-09-30 | End: 2024-10-30

## 2024-10-25 ENCOUNTER — OFFICE VISIT (OUTPATIENT)
Dept: PAIN MANAGEMENT | Age: 59
End: 2024-10-25
Payer: MEDICARE

## 2024-10-25 VITALS
WEIGHT: 152 LBS | HEIGHT: 63 IN | OXYGEN SATURATION: 98 % | BODY MASS INDEX: 26.93 KG/M2 | DIASTOLIC BLOOD PRESSURE: 82 MMHG | RESPIRATION RATE: 12 BRPM | HEART RATE: 84 BPM | SYSTOLIC BLOOD PRESSURE: 132 MMHG

## 2024-10-25 DIAGNOSIS — M79.7 FIBROMYALGIA: ICD-10-CM

## 2024-10-25 DIAGNOSIS — M79.18 MYOFASCIAL PAIN: ICD-10-CM

## 2024-10-25 DIAGNOSIS — M25.50 CHRONIC PAIN OF MULTIPLE JOINTS: ICD-10-CM

## 2024-10-25 DIAGNOSIS — M54.2 CERVICALGIA: Primary | ICD-10-CM

## 2024-10-25 DIAGNOSIS — M77.11 RIGHT LATERAL EPICONDYLITIS: ICD-10-CM

## 2024-10-25 DIAGNOSIS — G89.29 CHRONIC PAIN OF MULTIPLE JOINTS: ICD-10-CM

## 2024-10-25 PROCEDURE — 99213 OFFICE O/P EST LOW 20 MIN: CPT | Performed by: NURSE PRACTITIONER

## 2024-10-25 RX ORDER — ESTRADIOL 0.5 MG/1
0.5 TABLET ORAL DAILY
COMMUNITY
Start: 2024-10-04 | End: 2025-01-02

## 2024-10-25 RX ORDER — OXYCODONE AND ACETAMINOPHEN 10; 325 MG/1; MG/1
1 TABLET ORAL EVERY 6 HOURS PRN
Qty: 120 TABLET | Refills: 0 | Status: SHIPPED | OUTPATIENT
Start: 2024-10-30 | End: 2024-11-29

## 2024-10-25 ASSESSMENT — ENCOUNTER SYMPTOMS
RESPIRATORY NEGATIVE: 1
BACK PAIN: 1

## 2024-10-25 NOTE — PROGRESS NOTES
[facility] Pain Management  [address] [place]. [address]    Patient Name: Laura Jamil  MRN: [id number]  Encounter Date: 10/25/2024     SUBJECTIVE:  Laura Jamil is a 58 y.o., female being seen today regarding   Chief Complaint   Patient presents with    Chronic Pain     Fibromyalgia    and routine medication management.    Functionality Assessment & Goals:  On a scale of 0 (Does not Interfere) to 10 (Completely Interferes)  Which number describes how during the past week pain has interfered with the following:   A.  General Activity: 8    B.  Mood:  6   C.  Walking Ability:  5   D.  Normal Work (Includes both work outside the home and housework):  8   E.  Relations with Other People:  8   F.  Sleep:  10    G.  Enjoyment of Life:  6  2.  Patient prefers to Take their Pain Medications:   [x] On a regular basis   [] Only when necessary   [] Does not take pain medications  3.  What are the Patient's Goals/ Expectations for Visiting Pain Management?   [] Learn about my pain   [] Physical Therapy   [x] Receive Injections   [] Deal with Anxiety and Stress   [x] Receive Medication   [] Treat Depression    [] Treat Sleep   [] Treat Opioid Dependence/ Addiction    Current Pain Assessment:         10/25/2024     2:15 PM   AMB PAIN ASSESSMENT   Location of Pain Back   Location Modifiers Medial;Inferior;Posterior   Severity of Pain 7   Quality of Pain Throbbing;Sharp;Dull;Aching   Duration of Pain Persistent   Frequency of Pain Constant   Aggravating Factors Bending;Stretching;Straightening;Exercise;Kneeling;Squatting;Standing;Walking;Stairs   Limiting Behavior Yes   Relieving Factors Rest;Ice;Heat   Result of Injury No   Work-Related Injury No   Are there other pain locations you wish to document? No        Current Medications & Allergies:   Current Outpatient Medications   Medication Instructions    baclofen (LIORESAL) 10 mg, Oral, 3 TIMES DAILY    busPIRone (BUSPAR) 15 MG tablet TAKE 1

## 2024-11-25 DIAGNOSIS — M77.11 RIGHT LATERAL EPICONDYLITIS: ICD-10-CM

## 2024-11-25 DIAGNOSIS — M25.50 CHRONIC PAIN OF MULTIPLE JOINTS: ICD-10-CM

## 2024-11-25 DIAGNOSIS — G89.29 CHRONIC PAIN OF MULTIPLE JOINTS: ICD-10-CM

## 2024-11-25 DIAGNOSIS — M79.7 FIBROMYALGIA: ICD-10-CM

## 2024-11-25 RX ORDER — OXYCODONE AND ACETAMINOPHEN 10; 325 MG/1; MG/1
1 TABLET ORAL EVERY 6 HOURS PRN
Qty: 120 TABLET | Refills: 0 | Status: SHIPPED | OUTPATIENT
Start: 2024-11-30 | End: 2024-12-30

## 2024-11-25 NOTE — TELEPHONE ENCOUNTER
Percocet  mg  Ds11 9/12/24  [facility]   Last filled 10/30/24  Due 11/30/24  Last Appt:  10/25/2024  Next Appt:   11/26/2024  Med verified in Epic

## 2024-11-26 ENCOUNTER — OFFICE VISIT (OUTPATIENT)
Dept: PAIN MANAGEMENT | Age: 59
End: 2024-11-26
Payer: MEDICARE

## 2024-11-26 VITALS
HEIGHT: 63 IN | DIASTOLIC BLOOD PRESSURE: 88 MMHG | HEART RATE: 75 BPM | SYSTOLIC BLOOD PRESSURE: 132 MMHG | OXYGEN SATURATION: 97 % | WEIGHT: 154.4 LBS | RESPIRATION RATE: 14 BRPM | BODY MASS INDEX: 27.36 KG/M2

## 2024-11-26 DIAGNOSIS — M79.18 MYOFASCIAL PAIN: ICD-10-CM

## 2024-11-26 DIAGNOSIS — M54.2 CERVICALGIA: ICD-10-CM

## 2024-11-26 DIAGNOSIS — G89.29 CHRONIC PAIN OF MULTIPLE JOINTS: ICD-10-CM

## 2024-11-26 DIAGNOSIS — M79.7 FIBROMYALGIA: Primary | ICD-10-CM

## 2024-11-26 DIAGNOSIS — M25.50 CHRONIC PAIN OF MULTIPLE JOINTS: ICD-10-CM

## 2024-11-26 PROCEDURE — 99214 OFFICE O/P EST MOD 30 MIN: CPT | Performed by: NURSE PRACTITIONER

## 2024-11-26 PROCEDURE — 99213 OFFICE O/P EST LOW 20 MIN: CPT | Performed by: NURSE PRACTITIONER

## 2024-11-26 ASSESSMENT — ENCOUNTER SYMPTOMS
RESPIRATORY NEGATIVE: 1
BACK PAIN: 1

## 2024-11-26 NOTE — PROGRESS NOTES
[facility] Pain Management  [address] [place]. [address]    Patient Name: Laura Jamil  MRN: [id number]  Encounter Date: 11/26/2024     SUBJECTIVE:  Laura Jamil is a 59 y.o., female being seen today regarding   Chief Complaint   Patient presents with    cervicalgia     1 month    Fibromyalgia    and routine medication management.    Functionality Assessment & Goals:  On a scale of 0 (Does not Interfere) to 10 (Completely Interferes)  Which number describes how during the past week pain has interfered with the following:   A.  General Activity: 8    B.  Mood:  6   C.  Walking Ability:  5   D.  Normal Work (Includes both work outside the home and housework):  8   E.  Relations with Other People:  8   F.  Sleep:  10    G.  Enjoyment of Life:  6  2.  Patient prefers to Take their Pain Medications:   [x] On a regular basis   [] Only when necessary   [] Does not take pain medications  3.  What are the Patient's Goals/ Expectations for Visiting Pain Management?   [] Learn about my pain   [] Physical Therapy   [x] Receive Injections   [] Deal with Anxiety and Stress   [x] Receive Medication   [] Treat Depression    [] Treat Sleep   [] Treat Opioid Dependence/ Addiction    Current Pain Assessment:         11/26/2024     2:55 PM   AMB PAIN ASSESSMENT   Location of Pain Neck   Location Modifiers Left   Severity of Pain 7   Quality of Pain Popping;Throbbing;Sharp;Dull;Aching   Duration of Pain Persistent   Frequency of Pain Intermittent   Aggravating Factors Bending;Exercise;Kneeling;Squatting;Standing;Walking   Limiting Behavior Yes   Relieving Factors Other (Comment);Nsaids;Ice;Heat   Result of Injury No   Work-Related Injury No   Are there other pain locations you wish to document? No        Current Medications & Allergies:   Current Outpatient Medications   Medication Instructions    baclofen (LIORESAL) 10 mg, Oral, 3 TIMES DAILY    busPIRone (BUSPAR) 15 MG tablet TAKE 1 TABLET BY MOUTH

## 2024-12-26 DIAGNOSIS — G89.29 CHRONIC PAIN OF MULTIPLE JOINTS: ICD-10-CM

## 2024-12-26 DIAGNOSIS — M79.7 FIBROMYALGIA: ICD-10-CM

## 2024-12-26 DIAGNOSIS — M77.11 RIGHT LATERAL EPICONDYLITIS: ICD-10-CM

## 2024-12-26 DIAGNOSIS — M25.50 CHRONIC PAIN OF MULTIPLE JOINTS: ICD-10-CM

## 2024-12-26 RX ORDER — OXYCODONE AND ACETAMINOPHEN 10; 325 MG/1; MG/1
1 TABLET ORAL EVERY 6 HOURS PRN
Qty: 120 TABLET | Refills: 0 | Status: SHIPPED | OUTPATIENT
Start: 2024-12-26 | End: 2025-01-25

## 2024-12-26 NOTE — TELEPHONE ENCOUNTER
Laura called requesting a refill of the below medication which has been pended for you:     Requested Prescriptions     Pending Prescriptions Disp Refills    oxyCODONE-acetaminophen (PERCOCET)  MG per tablet 120 tablet 0     Sig: Take 1 tablet by mouth every 6 hours as needed for Pain for up to 30 days. Intended supply: 30 days       Last Appointment Date: 11/26/2024  Next Appointment Date: 12/30/2024    No Known Allergies    Pharmacy:  [facility]     OARRS Report checked for [place], [place], and [place]: Percocet  #120. Due 12/30/24.

## 2024-12-30 ENCOUNTER — OFFICE VISIT (OUTPATIENT)
Dept: PAIN MANAGEMENT | Age: 59
End: 2024-12-30
Payer: MEDICARE

## 2024-12-30 VITALS
HEIGHT: 63 IN | RESPIRATION RATE: 16 BRPM | HEART RATE: 90 BPM | OXYGEN SATURATION: 98 % | BODY MASS INDEX: 27.25 KG/M2 | WEIGHT: 153.8 LBS | SYSTOLIC BLOOD PRESSURE: 142 MMHG | DIASTOLIC BLOOD PRESSURE: 82 MMHG

## 2024-12-30 DIAGNOSIS — G89.29 CHRONIC PAIN OF MULTIPLE JOINTS: ICD-10-CM

## 2024-12-30 DIAGNOSIS — M79.18 MYOFASCIAL PAIN: ICD-10-CM

## 2024-12-30 DIAGNOSIS — M79.7 FIBROMYALGIA: Primary | ICD-10-CM

## 2024-12-30 DIAGNOSIS — M25.50 CHRONIC PAIN OF MULTIPLE JOINTS: ICD-10-CM

## 2024-12-30 DIAGNOSIS — M54.2 CERVICALGIA: ICD-10-CM

## 2024-12-30 PROCEDURE — 99214 OFFICE O/P EST MOD 30 MIN: CPT | Performed by: NURSE PRACTITIONER

## 2024-12-30 PROCEDURE — 99213 OFFICE O/P EST LOW 20 MIN: CPT | Performed by: NURSE PRACTITIONER

## 2024-12-30 ASSESSMENT — ENCOUNTER SYMPTOMS
RESPIRATORY NEGATIVE: 1
BACK PAIN: 1

## 2025-01-22 DIAGNOSIS — M77.11 RIGHT LATERAL EPICONDYLITIS: ICD-10-CM

## 2025-01-22 DIAGNOSIS — G89.29 CHRONIC PAIN OF MULTIPLE JOINTS: ICD-10-CM

## 2025-01-22 DIAGNOSIS — M25.50 CHRONIC PAIN OF MULTIPLE JOINTS: ICD-10-CM

## 2025-01-22 DIAGNOSIS — M79.7 FIBROMYALGIA: ICD-10-CM

## 2025-01-22 RX ORDER — OXYCODONE AND ACETAMINOPHEN 10; 325 MG/1; MG/1
1 TABLET ORAL EVERY 6 HOURS PRN
Qty: 120 TABLET | Refills: 0 | Status: SHIPPED | OUTPATIENT
Start: 2025-01-29 | End: 2025-02-28

## 2025-01-22 NOTE — TELEPHONE ENCOUNTER
Patient called for refill on percocet 10/325    12/30/2024  2/17/2025    OARRS Report checked for [place], [place], and [place]:Percocet 10/325 12/30/24 #120. Due 1/29/25.

## 2025-02-13 DIAGNOSIS — M79.7 FIBROMYALGIA: ICD-10-CM

## 2025-02-13 DIAGNOSIS — M79.18 MYOFASCIAL PAIN: ICD-10-CM

## 2025-02-14 RX ORDER — IBUPROFEN 800 MG/1
800 TABLET, FILM COATED ORAL EVERY 6 HOURS PRN
Qty: 360 TABLET | Refills: 5 | Status: SHIPPED | OUTPATIENT
Start: 2025-02-14

## 2025-02-17 ENCOUNTER — OFFICE VISIT (OUTPATIENT)
Dept: PAIN MANAGEMENT | Age: 60
End: 2025-02-17
Payer: MEDICARE

## 2025-02-17 VITALS
HEART RATE: 65 BPM | RESPIRATION RATE: 14 BRPM | DIASTOLIC BLOOD PRESSURE: 82 MMHG | HEIGHT: 63 IN | OXYGEN SATURATION: 99 % | SYSTOLIC BLOOD PRESSURE: 134 MMHG | WEIGHT: 153 LBS | BODY MASS INDEX: 27.11 KG/M2

## 2025-02-17 DIAGNOSIS — M77.11 RIGHT LATERAL EPICONDYLITIS: ICD-10-CM

## 2025-02-17 DIAGNOSIS — M79.18 MYOFASCIAL PAIN: ICD-10-CM

## 2025-02-17 DIAGNOSIS — G89.29 CHRONIC PAIN OF MULTIPLE JOINTS: ICD-10-CM

## 2025-02-17 DIAGNOSIS — M25.50 CHRONIC PAIN OF MULTIPLE JOINTS: ICD-10-CM

## 2025-02-17 DIAGNOSIS — M79.7 FIBROMYALGIA: ICD-10-CM

## 2025-02-17 DIAGNOSIS — M54.2 CERVICALGIA: Primary | ICD-10-CM

## 2025-02-17 PROCEDURE — 99214 OFFICE O/P EST MOD 30 MIN: CPT | Performed by: NURSE PRACTITIONER

## 2025-02-17 PROCEDURE — 99213 OFFICE O/P EST LOW 20 MIN: CPT | Performed by: NURSE PRACTITIONER

## 2025-02-17 RX ORDER — TRAZODONE HYDROCHLORIDE 100 MG/1
100 TABLET ORAL NIGHTLY
COMMUNITY
Start: 2025-02-05 | End: 2025-03-07

## 2025-02-17 RX ORDER — OXYCODONE AND ACETAMINOPHEN 10; 325 MG/1; MG/1
1 TABLET ORAL EVERY 6 HOURS PRN
Qty: 120 TABLET | Refills: 0 | Status: SHIPPED | OUTPATIENT
Start: 2025-02-28 | End: 2025-03-30

## 2025-02-17 ASSESSMENT — ENCOUNTER SYMPTOMS
BACK PAIN: 1
RESPIRATORY NEGATIVE: 1

## 2025-02-17 NOTE — PROGRESS NOTES
[facility] Pain Management  [address] [place]. [address]    Patient Name: Laura Jamil  MRN: [id number]  Encounter Date: 2/17/2025     SUBJECTIVE:  Laura Jamil is a 59 y.o., female being seen today regarding   Chief Complaint   Patient presents with    Neck Pain     Cervicalgia    Wrist Pain    and routine medication management.    Functionality Assessment & Goals:  On a scale of 0 (Does not Interfere) to 10 (Completely Interferes)  Which number describes how during the past week pain has interfered with the following:   A.  General Activity: 8    B.  Mood:  6   C.  Walking Ability:  5   D.  Normal Work (Includes both work outside the home and housework):  8   E.  Relations with Other People:  8   F.  Sleep:  10    G.  Enjoyment of Life:  6  2.  Patient prefers to Take their Pain Medications:   [x] On a regular basis   [] Only when necessary   [] Does not take pain medications  3.  What are the Patient's Goals/ Expectations for Visiting Pain Management?   [] Learn about my pain   [] Physical Therapy   [x] Receive Injections   [] Deal with Anxiety and Stress   [x] Receive Medication   [] Treat Depression    [] Treat Sleep   [] Treat Opioid Dependence/ Addiction    Current Pain Assessment:         2/17/2025     2:09 PM   AMB PAIN ASSESSMENT   Location of Pain Neck   Location Modifiers Medial   Severity of Pain 5   Quality of Pain Popping;Cracking;Throbbing   Duration of Pain Persistent   Frequency of Pain Constant   Aggravating Factors Bending;Stretching;Straightening;Exercise;Kneeling;Squatting;Standing;Walking;Stairs   Limiting Behavior Yes   Relieving Factors Rest;Ice;Heat   Result of Injury No   Work-Related Injury No   Are there other pain locations you wish to document? Yes        Current Medications & Allergies:   Current Outpatient Medications   Medication Instructions    baclofen (LIORESAL) 10 mg, Oral, 3 TIMES DAILY    busPIRone (BUSPAR) 15 MG tablet TAKE 1 TABLET BY

## 2025-02-27 DIAGNOSIS — G89.29 CHRONIC PAIN OF MULTIPLE JOINTS: ICD-10-CM

## 2025-02-27 DIAGNOSIS — M25.50 CHRONIC PAIN OF MULTIPLE JOINTS: ICD-10-CM

## 2025-02-27 DIAGNOSIS — M77.11 RIGHT LATERAL EPICONDYLITIS: ICD-10-CM

## 2025-02-27 DIAGNOSIS — M79.7 FIBROMYALGIA: ICD-10-CM

## 2025-02-27 RX ORDER — OXYCODONE AND ACETAMINOPHEN 10; 325 MG/1; MG/1
1 TABLET ORAL EVERY 6 HOURS PRN
Qty: 120 TABLET | Refills: 0 | Status: SHIPPED | OUTPATIENT
Start: 2025-02-28 | End: 2025-03-30

## 2025-02-27 NOTE — TELEPHONE ENCOUNTER
Laura called requesting a refill of the below medication which has been pended for you:     Requested Prescriptions     Pending Prescriptions Disp Refills    oxyCODONE-acetaminophen (PERCOCET)  MG per tablet 120 tablet 0     Sig: Take 1 tablet by mouth every 6 hours as needed for Pain for up to 30 days. Intended supply: 30 days       Last Appointment Date: 2/17/2025  Next Appointment Date: 3/20/2025    No Known Allergies    Pharmacy:  [facility]    Last DS11 9/12/24.  Please review.     OARRS Report checked for [place], [place], and [place]:     Percocet 10/325 mg  1/29/25 #120. Due 2/28/25.

## 2025-03-20 ENCOUNTER — OFFICE VISIT (OUTPATIENT)
Dept: PAIN MANAGEMENT | Age: 60
End: 2025-03-20
Payer: MEDICARE

## 2025-03-20 ENCOUNTER — HOSPITAL ENCOUNTER (OUTPATIENT)
Age: 60
Setting detail: SPECIMEN
Discharge: HOME | End: 2025-03-20
Payer: MEDICARE

## 2025-03-20 VITALS
RESPIRATION RATE: 12 BRPM | WEIGHT: 156 LBS | SYSTOLIC BLOOD PRESSURE: 132 MMHG | DIASTOLIC BLOOD PRESSURE: 80 MMHG | OXYGEN SATURATION: 98 % | BODY MASS INDEX: 27.64 KG/M2 | HEART RATE: 93 BPM | HEIGHT: 63 IN

## 2025-03-20 DIAGNOSIS — M79.18 MYOFASCIAL PAIN: ICD-10-CM

## 2025-03-20 DIAGNOSIS — Z79.891 ENCOUNTER FOR LONG-TERM OPIATE ANALGESIC USE: ICD-10-CM

## 2025-03-20 DIAGNOSIS — G89.29 CHRONIC PAIN OF MULTIPLE JOINTS: Primary | ICD-10-CM

## 2025-03-20 DIAGNOSIS — M79.7 FIBROMYALGIA: ICD-10-CM

## 2025-03-20 DIAGNOSIS — Z02.83 ENCOUNTER FOR DRUG SCREENING: ICD-10-CM

## 2025-03-20 DIAGNOSIS — M25.50 CHRONIC PAIN OF MULTIPLE JOINTS: ICD-10-CM

## 2025-03-20 DIAGNOSIS — R52 PAIN MANAGEMENT: ICD-10-CM

## 2025-03-20 DIAGNOSIS — M77.11 RIGHT LATERAL EPICONDYLITIS: ICD-10-CM

## 2025-03-20 DIAGNOSIS — G89.29 CHRONIC PAIN OF MULTIPLE JOINTS: ICD-10-CM

## 2025-03-20 DIAGNOSIS — M54.2 CERVICALGIA: ICD-10-CM

## 2025-03-20 DIAGNOSIS — M25.50 CHRONIC PAIN OF MULTIPLE JOINTS: Primary | ICD-10-CM

## 2025-03-20 PROCEDURE — 99213 OFFICE O/P EST LOW 20 MIN: CPT | Performed by: NURSE PRACTITIONER

## 2025-03-20 PROCEDURE — 80307 DRUG TEST PRSMV CHEM ANLYZR: CPT

## 2025-03-20 PROCEDURE — G0481 DRUG TEST DEF 8-14 CLASSES: HCPCS

## 2025-03-20 RX ORDER — OXYCODONE AND ACETAMINOPHEN 10; 325 MG/1; MG/1
1 TABLET ORAL EVERY 6 HOURS PRN
Qty: 120 TABLET | Refills: 0 | Status: SHIPPED | OUTPATIENT
Start: 2025-03-29 | End: 2025-04-28

## 2025-03-20 RX ORDER — BACLOFEN 10 MG/1
10 TABLET ORAL 3 TIMES DAILY
Qty: 270 TABLET | Refills: 3 | Status: SHIPPED | OUTPATIENT
Start: 2025-03-20

## 2025-03-20 ASSESSMENT — ENCOUNTER SYMPTOMS
RESPIRATORY NEGATIVE: 1
BACK PAIN: 1

## 2025-03-20 NOTE — PROGRESS NOTES
management.  Assessment & Plan  1. Pain management.  She reports constant pain and uses Salonpas patches and hot baths for relief. She is currently taking Percocet 4 times a day but feels she needs more for adequate pain control. A prescription for Percocet has been sent to her pharmacy. She is advised to continue her current regimen and to incorporate stretching exercises into her routine to help alleviate pain. A urine sample will be collected today.    The patient shows no evidence of misuse or diversion of medications and denies use of alcohol or illegal substances.  A urine drug screen has been appropriate as shown with the most recent screen.    Controlled Substance Monitoring:    Acute and Chronic Pain Monitoring:   RX Monitoring Periodic Controlled Substance Monitoring   3/20/2025   2:12 PM No signs of potential drug abuse or diversion identified.;Obtaining appropriate analgesic effect of treatment.     Follow-up Plan:  Schedule patient to follow up with our office for recheck monthly     JACKLYN Romero - CNP  Pain Management  [facility]     The patient (or guardian, if applicable) and other individuals in attendance with the patient were advised that Artificial Intelligence will be utilized during this visit to record, process the conversation to generate a clinical note, and support improvement of the AI technology. The patient (or guardian, if applicable) and other individuals in attendance at the appointment consented to the use of AI, including the recording.

## 2025-03-20 NOTE — TELEPHONE ENCOUNTER
Laura called requesting a refill of the below medication which has been pended for you:     Requested Prescriptions     Pending Prescriptions Disp Refills    baclofen (LIORESAL) 10 MG tablet [Pharmacy Med Name: baclofen 10 mg tablet] 270 tablet 3     Sig: TAKE 1 TABLET BY MOUTH 3 TIMES DAILY       Last Appointment Date: 2/17/2025  Next Appointment Date: 3/20/2025    No Known Allergies    Pharmacy:  [facility]

## 2025-03-24 LAB
6-ACETYLMORPHINE, UR: NOT DETECTED
7-AMINOCLONAZEPAM, URINE: NOT DETECTED
ALPHA-OH-ALPRAZ, URINE: NOT DETECTED
ALPHA-OH-MIDAZOLAM, URINE: NOT DETECTED
ALPRAZOLAM, URINE: NOT DETECTED
AMPHETAMINE, URINE: NOT DETECTED
BARBITURATES, URINE: NEGATIVE
BENZOYLECGONINE, UR: NEGATIVE
BUPRENORPHINE URINE: NOT DETECTED
CARISOPRODOL, UR: NEGATIVE
CLONAZEPAM, URINE: NOT DETECTED
CODEINE, URINE: NOT DETECTED
CREAT UR-MCNC: 109.7 MG/DL (ref 20–400)
DIAZEPAM, URINE: NOT DETECTED
EER PAIN MGT DRUG PANEL, HIGH RES/EMIT U: NORMAL
ETHYL GLUCURONIDE UR: NORMAL
FENTANYL URINE: NOT DETECTED
GABAPENTIN: NOT DETECTED
HYDROCODONE, URINE: NOT DETECTED
HYDROMORPHONE, URINE: NOT DETECTED
LORAZEPAM, URINE: NOT DETECTED
MARIJUANA METAB, UR: NEGATIVE
MDA, URINE: NOT DETECTED
MDEA, EVE, UR: NOT DETECTED
MDMA, URINE: NOT DETECTED
MEPERIDINE METAB, UR: NOT DETECTED
METHADONE, URINE: NEGATIVE
METHAMPHETAMINE, URINE: NOT DETECTED
METHYLPHENIDATE: NOT DETECTED
MIDAZOLAM, URINE: NOT DETECTED
MORPHINE, OPI1M: NOT DETECTED
NALOXONE URINE: NOT DETECTED
NORBUPRENORPHINE, URINE: NOT DETECTED
NORDIAZEPAM, URINE: NOT DETECTED
NORFENTANYL, URINE: NOT DETECTED
NORHYDROCODONE, URINE: NOT DETECTED
NOROXYCODONE, URINE: PRESENT
NOROXYMORPHONE, URINE: PRESENT
OXAZEPAM, URINE: NOT DETECTED
OXYCODONE URINE: PRESENT
OXYMORPHONE, URINE: PRESENT
PAIN MGT DRUG PANEL, HI RES, UR: NORMAL
PCP,URINE: NEGATIVE
PHENTERMINE, UR: NOT DETECTED
PREGABALIN: NOT DETECTED
TAPENTADOL, URINE: NOT DETECTED
TAPENTADOL-O-SULFATE, URINE: NOT DETECTED
TEMAZEPAM, URINE: NOT DETECTED
TRAMADOL, URINE: NEGATIVE
ZOLPIDEM METABOLITE (ZCA), URINE: NOT DETECTED
ZOLPIDEM, URINE: NOT DETECTED

## 2025-04-24 ENCOUNTER — OFFICE VISIT (OUTPATIENT)
Dept: PAIN MANAGEMENT | Age: 60
End: 2025-04-24
Payer: MEDICARE

## 2025-04-24 VITALS
WEIGHT: 153.6 LBS | SYSTOLIC BLOOD PRESSURE: 124 MMHG | HEIGHT: 63 IN | OXYGEN SATURATION: 97 % | DIASTOLIC BLOOD PRESSURE: 82 MMHG | HEART RATE: 80 BPM | RESPIRATION RATE: 12 BRPM | BODY MASS INDEX: 27.21 KG/M2

## 2025-04-24 DIAGNOSIS — R52 PAIN MANAGEMENT: ICD-10-CM

## 2025-04-24 DIAGNOSIS — G89.29 CHRONIC PAIN OF MULTIPLE JOINTS: ICD-10-CM

## 2025-04-24 DIAGNOSIS — M54.2 CERVICALGIA: ICD-10-CM

## 2025-04-24 DIAGNOSIS — M77.11 RIGHT LATERAL EPICONDYLITIS: ICD-10-CM

## 2025-04-24 DIAGNOSIS — M79.7 FIBROMYALGIA: ICD-10-CM

## 2025-04-24 DIAGNOSIS — M79.18 MYOFASCIAL PAIN: ICD-10-CM

## 2025-04-24 DIAGNOSIS — G89.29 ENCOUNTER FOR CHRONIC PAIN MANAGEMENT: Primary | ICD-10-CM

## 2025-04-24 DIAGNOSIS — M25.50 CHRONIC PAIN OF MULTIPLE JOINTS: ICD-10-CM

## 2025-04-24 PROCEDURE — 99213 OFFICE O/P EST LOW 20 MIN: CPT | Performed by: NURSE PRACTITIONER

## 2025-04-24 PROCEDURE — 99215 OFFICE O/P EST HI 40 MIN: CPT | Performed by: NURSE PRACTITIONER

## 2025-04-24 RX ORDER — TRAZODONE HYDROCHLORIDE 100 MG/1
100 TABLET ORAL NIGHTLY
COMMUNITY
Start: 2025-04-17 | End: 2025-05-17

## 2025-04-24 RX ORDER — OXYCODONE AND ACETAMINOPHEN 10; 325 MG/1; MG/1
1 TABLET ORAL EVERY 6 HOURS PRN
Qty: 120 TABLET | Refills: 0 | Status: SHIPPED | OUTPATIENT
Start: 2025-04-26 | End: 2025-05-26

## 2025-04-24 ASSESSMENT — ENCOUNTER SYMPTOMS
RESPIRATORY NEGATIVE: 1
BACK PAIN: 1

## 2025-04-24 NOTE — PROGRESS NOTES
[facility] Pain Management  [address] [place]. [address]    Patient Name: Laura Jamil  MRN: [id number]  Encounter Date: 4/24/2025     SUBJECTIVE:  Laura Jamil is a 59 y.o., female being seen today regarding   Chief Complaint   Patient presents with    Back Pain    Neck Pain    and routine medication management.    History of Present Illness  The patient is a 59-year-old female who presents for medication refill.    She reports that her current medication regimen, which includes Percocet, ibuprofen, and baclofen, is effective in managing her symptoms. She has been experiencing awakenings at approximately 1:00 AM. She also mentions an improvement in her visual acuity following the acquisition of new spectacles, although she has not yet tested their efficacy during nighttime driving. She is seeking a refill of her Percocet prescription.    MEDICATIONS  Current: Percocet, ibuprofen, baclofen    Functionality Assessment & Goals:  On a scale of 0 (Does not Interfere) to 10 (Completely Interferes)  Which number describes how during the past week pain has interfered with the following:   A.  General Activity: 8    B.  Mood:  6   C.  Walking Ability:  5   D.  Normal Work (Includes both work outside the home and housework):  8   E.  Relations with Other People:  8   F.  Sleep:  10    G.  Enjoyment of Life:  6  2.  Patient prefers to Take their Pain Medications:   [x] On a regular basis   [] Only when necessary   [] Does not take pain medications  3.  What are the Patient's Goals/ Expectations for Visiting Pain Management?   [] Learn about my pain   [] Physical Therapy   [x] Receive Injections   [] Deal with Anxiety and Stress   [x] Receive Medication   [] Treat Depression    [] Treat Sleep   [] Treat Opioid Dependence/ Addiction    Current Pain Assessment:         4/24/2025     2:23 PM   AMB PAIN ASSESSMENT   Location of Pain Neck   Location Modifiers Left;Medial   Severity of Pain 5

## 2025-05-09 NOTE — PROGRESS NOTES
[facility]  [address] [place]. [address]    Patient Name: Laura Jamil  MRN: [id number]  Encounter Date: 12/30/2024     SUBJECTIVE:  Laura Jamil is a 59 y.o., female being seen today regarding   Chief Complaint   Patient presents with    cervicalgia     5 weeks    Chronic Pain    and routine medication management.    Functionality Assessment & Goals:  On a scale of 0 (Does not Interfere) to 10 (Completely Interferes)  Which number describes how during the past week pain has interfered with the following:   A.  General Activity: 8    B.  Mood:  6   C.  Walking Ability:  5   D.  Normal Work (Includes both work outside the home and housework):  8   E.  Relations with Other People:  8   F.  Sleep:  10    G.  Enjoyment of Life:  6  2.  Patient prefers to Take their Pain Medications:   [x] On a regular basis   [] Only when necessary   [] Does not take pain medications  3.  What are the Patient's Goals/ Expectations for Visiting Pain Management?   [] Learn about my pain   [] Physical Therapy   [x] Receive Injections   [] Deal with Anxiety and Stress   [x] Receive Medication   [] Treat Depression    [] Treat Sleep   [] Treat Opioid Dependence/ Addiction    Current Pain Assessment:         12/30/2024     2:25 PM   AMB PAIN ASSESSMENT   Location of Pain Back   Location Modifiers Left;Right;Anterior;Posterior;Superior;Inferior;Medial;Lateral;Dorsal   Severity of Pain 7   Quality of Pain Sharp;Aching;Dull   Duration of Pain Persistent   Frequency of Pain Constant   Aggravating Factors Bending;Exercise;Kneeling;Squatting;Standing;Walking;Stairs   Limiting Behavior Yes   Relieving Factors Heat   Result of Injury No   Work-Related Injury No   Are there other pain locations you wish to document? No        Current Medications & Allergies:   Current Outpatient Medications   Medication Instructions    baclofen (LIORESAL) 10 mg, Oral, 3 TIMES DAILY    busPIRone (BUSPAR) 15 MG tablet  oriented to person, place, time and situation

## 2025-05-23 ENCOUNTER — OFFICE VISIT (OUTPATIENT)
Dept: PAIN MANAGEMENT | Age: 60
End: 2025-05-23
Payer: MEDICARE

## 2025-05-23 VITALS
HEIGHT: 63 IN | OXYGEN SATURATION: 98 % | BODY MASS INDEX: 27.11 KG/M2 | WEIGHT: 153 LBS | HEART RATE: 83 BPM | DIASTOLIC BLOOD PRESSURE: 72 MMHG | RESPIRATION RATE: 12 BRPM | SYSTOLIC BLOOD PRESSURE: 120 MMHG

## 2025-05-23 DIAGNOSIS — M79.7 FIBROMYALGIA: ICD-10-CM

## 2025-05-23 DIAGNOSIS — M79.18 MYOFASCIAL PAIN: ICD-10-CM

## 2025-05-23 DIAGNOSIS — M25.50 CHRONIC PAIN OF MULTIPLE JOINTS: ICD-10-CM

## 2025-05-23 DIAGNOSIS — M54.2 CERVICALGIA: ICD-10-CM

## 2025-05-23 DIAGNOSIS — G89.29 ENCOUNTER FOR CHRONIC PAIN MANAGEMENT: ICD-10-CM

## 2025-05-23 DIAGNOSIS — R52 PAIN MANAGEMENT: Primary | ICD-10-CM

## 2025-05-23 DIAGNOSIS — G89.29 CHRONIC PAIN OF MULTIPLE JOINTS: ICD-10-CM

## 2025-05-23 DIAGNOSIS — M77.11 RIGHT LATERAL EPICONDYLITIS: ICD-10-CM

## 2025-05-23 PROCEDURE — 99213 OFFICE O/P EST LOW 20 MIN: CPT | Performed by: NURSE PRACTITIONER

## 2025-05-23 RX ORDER — OXYCODONE AND ACETAMINOPHEN 10; 325 MG/1; MG/1
1 TABLET ORAL EVERY 6 HOURS PRN
Qty: 120 TABLET | Refills: 0 | Status: SHIPPED | OUTPATIENT
Start: 2025-05-24 | End: 2025-06-23

## 2025-05-23 ASSESSMENT — ENCOUNTER SYMPTOMS
BACK PAIN: 1
RESPIRATORY NEGATIVE: 1

## 2025-05-23 NOTE — PROGRESS NOTES
[facility]  [address] [place]. [address]    Patient Name: Laura Jamil  MRN: [id number]  Encounter Date: 5/23/2025     SUBJECTIVE:  Laura Jamil is a 59 y.o., female being seen today regarding   Chief Complaint   Patient presents with    Neck Pain    Knee Pain    and routine medication management.    History of Present Illness    The patient is a 59-year-old female who presents for evaluation of pain management.    She reports that her current analgesic regimen of Percocet 10, administered 4 times daily, is effectively managing her pain without any adverse effects. She does not express any concerns regarding potential addiction, citing her long-term use of the medication as evidence of its safety. She adheres strictly to the prescribed dosage and does not exceed the recommended intake. She is seeking a refill of her pain medication prescription.    Additionally, she mentions that she has previously undergone radiographic imaging and received injections in her knees, which she believes were beneficial. However, she notes that the current weather conditions appear to be exacerbating her symptoms.    MEDICATIONS  Percocet, Pristiq    Functionality Assessment & Goals:  On a scale of 0 (Does not Interfere) to 10 (Completely Interferes)  Which number describes how during the past week pain has interfered with the following:   A.  General Activity: 8    B.  Mood:  6   C.  Walking Ability:  5   D.  Normal Work (Includes both work outside the home and housework):  8   E.  Relations with Other People:  8   F.  Sleep:  10    G.  Enjoyment of Life:  6  2.  Patient prefers to Take their Pain Medications:   [x] On a regular basis   [] Only when necessary   [] Does not take pain medications  3.  What are the Patient's Goals/ Expectations for Visiting Pain Management?   [] Learn about my pain   [] Physical Therapy   [x] Receive Injections   [] Deal with Anxiety and Stress   [x]

## 2025-06-02 ENCOUNTER — PROCEDURE VISIT (OUTPATIENT)
Dept: PAIN MANAGEMENT | Age: 60
End: 2025-06-02
Payer: MEDICARE

## 2025-06-02 VITALS
OXYGEN SATURATION: 98 % | HEART RATE: 82 BPM | BODY MASS INDEX: 27.11 KG/M2 | SYSTOLIC BLOOD PRESSURE: 132 MMHG | RESPIRATION RATE: 12 BRPM | WEIGHT: 153 LBS | HEIGHT: 63 IN | DIASTOLIC BLOOD PRESSURE: 64 MMHG

## 2025-06-02 DIAGNOSIS — G89.29 CHRONIC PAIN OF BOTH KNEES: ICD-10-CM

## 2025-06-02 DIAGNOSIS — M79.7 FIBROMYALGIA: Primary | ICD-10-CM

## 2025-06-02 DIAGNOSIS — M25.562 CHRONIC PAIN OF BOTH KNEES: ICD-10-CM

## 2025-06-02 DIAGNOSIS — M25.561 CHRONIC PAIN OF BOTH KNEES: ICD-10-CM

## 2025-06-02 PROCEDURE — 20610 DRAIN/INJ JOINT/BURSA W/O US: CPT | Performed by: NURSE PRACTITIONER

## 2025-06-02 PROCEDURE — PBSHW PBB SHADOW CHARGE: Performed by: NURSE PRACTITIONER

## 2025-06-02 PROCEDURE — 99213 OFFICE O/P EST LOW 20 MIN: CPT | Performed by: NURSE PRACTITIONER

## 2025-06-02 RX ORDER — LIDOCAINE HYDROCHLORIDE 20 MG/ML
3 INJECTION, SOLUTION EPIDURAL; INFILTRATION; INTRACAUDAL; PERINEURAL ONCE
Status: COMPLETED | OUTPATIENT
Start: 2025-06-02 | End: 2025-06-02

## 2025-06-02 RX ORDER — TRIAMCINOLONE ACETONIDE 40 MG/ML
40 INJECTION, SUSPENSION INTRA-ARTICULAR; INTRAMUSCULAR ONCE
Status: COMPLETED | OUTPATIENT
Start: 2025-06-02 | End: 2025-06-02

## 2025-06-02 RX ADMIN — TRIAMCINOLONE ACETONIDE 40 MG: 40 SUSPENSION INTRA-ARTERIAL; INTRAMUSCULAR at 16:17

## 2025-06-02 RX ADMIN — LIDOCAINE HYDROCHLORIDE 3 ML: 20 INJECTION, SOLUTION EPIDURAL; INFILTRATION; INTRACAUDAL; PERINEURAL at 16:15

## 2025-06-02 RX ADMIN — LIDOCAINE HYDROCHLORIDE 3 ML: 20 INJECTION, SOLUTION EPIDURAL; INFILTRATION; INTRACAUDAL; PERINEURAL at 16:14

## 2025-06-02 RX ADMIN — TRIAMCINOLONE ACETONIDE 40 MG: 40 SUSPENSION INTRA-ARTERIAL; INTRAMUSCULAR at 16:18

## 2025-06-05 NOTE — PROGRESS NOTES
Laura Jamil  1965 6/5/25      PAIN MANAGEMENT CLINIC PROCEDURE NOTE    CHIEF COMPLAINT: This is a 59 y.o. female patient who presents to the Pain Management Clinic with a history of pain in the bilateral knee(s).    PRE-PROCEDURE DIAGNOSIS: Bilateralknee pain     POST-PROCEDURE DIAGNOSIS: same as pre-procedure diagnosis    Vitals:    06/02/25 1355   BP: 132/64   Pulse: 82   Resp: 12   SpO2: 98%       PROCEDURE:     The procedure was explained, including risks and benefits, and the patient has agreed to proceed. The injection site was marked on the patient’s skin. A large area around the injection site was cleaned with chlora-prep.      JOINT INJECTION  A 25G 1.5 inch needle was inserted into the bilateral knee(s) joint/ space using a anterolateral approach. Depth and direction of the needle were monitored at all times. The syringe was aspirated and was negative for heme. Synovial fluid  was not not aspirated. Then, 3 ml of  2% lidocaine and 40mg of kenalog (NDC# 0196-1850-66) was injected into the joint.     The injection site was cleaned and dressed with a spot band aid.  The patient tolerated the procedure well and with out complication The patient was instructed avoid heat and excessive activity for 24-48 hours.

## 2025-06-19 ENCOUNTER — OFFICE VISIT (OUTPATIENT)
Dept: PAIN MANAGEMENT | Age: 60
End: 2025-06-19
Payer: MEDICARE

## 2025-06-19 VITALS
HEART RATE: 75 BPM | WEIGHT: 149 LBS | BODY MASS INDEX: 26.4 KG/M2 | HEIGHT: 63 IN | DIASTOLIC BLOOD PRESSURE: 62 MMHG | OXYGEN SATURATION: 98 % | SYSTOLIC BLOOD PRESSURE: 124 MMHG | RESPIRATION RATE: 12 BRPM

## 2025-06-19 DIAGNOSIS — M79.7 FIBROMYALGIA: ICD-10-CM

## 2025-06-19 DIAGNOSIS — M54.2 CERVICALGIA: ICD-10-CM

## 2025-06-19 DIAGNOSIS — G89.29 CHRONIC PAIN OF MULTIPLE JOINTS: ICD-10-CM

## 2025-06-19 DIAGNOSIS — M79.18 MYOFASCIAL PAIN: ICD-10-CM

## 2025-06-19 DIAGNOSIS — R52 PAIN MANAGEMENT: Primary | ICD-10-CM

## 2025-06-19 DIAGNOSIS — M77.11 RIGHT LATERAL EPICONDYLITIS: ICD-10-CM

## 2025-06-19 DIAGNOSIS — M25.50 CHRONIC PAIN OF MULTIPLE JOINTS: ICD-10-CM

## 2025-06-19 PROCEDURE — 99215 OFFICE O/P EST HI 40 MIN: CPT | Performed by: NURSE PRACTITIONER

## 2025-06-19 PROCEDURE — 99213 OFFICE O/P EST LOW 20 MIN: CPT | Performed by: NURSE PRACTITIONER

## 2025-06-19 RX ORDER — TRAZODONE HYDROCHLORIDE 100 MG/1
100 TABLET ORAL NIGHTLY
COMMUNITY
Start: 2025-06-09 | End: 2025-12-06

## 2025-06-19 RX ORDER — OXYCODONE AND ACETAMINOPHEN 10; 325 MG/1; MG/1
1 TABLET ORAL EVERY 6 HOURS PRN
Qty: 120 TABLET | Refills: 0 | Status: SHIPPED | OUTPATIENT
Start: 2025-06-23 | End: 2025-07-23

## 2025-06-19 NOTE — PROGRESS NOTES
[facility]  [address] [place]. [address]    Patient Name: Laura Jamil  MRN: [id number]  Encounter Date: 7/14/2025     SUBJECTIVE:  Laura Jamil is a 59 y.o., female being seen today regarding   Chief Complaint   Patient presents with    Neck Pain    Pain     Generalized     and routine medication management.    History of Present Illness  The patient is a 59-year-old female who presents for evaluation of foot pain.    She has been experiencing discomfort in her foot due to a bruise, which she attributes to an incident where she stepped on an object. This has resulted in her walking on the edge of her foot.    Functionality Assessment & Goals:  On a scale of 0 (Does not Interfere) to 10 (Completely Interferes)  Which number describes how during the past week pain has interfered with the following:   A.  General Activity: 8    B.  Mood:  6   C.  Walking Ability:  5   D.  Normal Work (Includes both work outside the home and housework):  8   E.  Relations with Other People:  8   F.  Sleep:  10    G.  Enjoyment of Life:  6  2.  Patient prefers to Take their Pain Medications:   [x] On a regular basis   [] Only when necessary   [] Does not take pain medications  3.  What are the Patient's Goals/ Expectations for Visiting Pain Management?   [] Learn about my pain   [] Physical Therapy   [x] Receive Injections   [] Deal with Anxiety and Stress   [x] Receive Medication   [] Treat Depression    [] Treat Sleep   [] Treat Opioid Dependence/ Addiction    Current Pain Assessment:         6/19/2025     2:37 PM   AMB PAIN ASSESSMENT   Location of Pain Neck   Location Modifiers Medial;Inferior   Severity of Pain 6   Quality of Pain Throbbing;Sharp;Dull;Aching   Duration of Pain Persistent   Frequency of Pain Constant   Aggravating Factors Bending;Stretching;Straightening;Exercise;Kneeling;Squatting;Standing;Walking;Stairs   Limiting Behavior Yes   Relieving Factors Rest;Ice;Heat

## 2025-07-21 ENCOUNTER — OFFICE VISIT (OUTPATIENT)
Dept: PAIN MANAGEMENT | Age: 60
End: 2025-07-21
Payer: MEDICARE

## 2025-07-21 VITALS
HEART RATE: 97 BPM | BODY MASS INDEX: 26.4 KG/M2 | HEIGHT: 63 IN | SYSTOLIC BLOOD PRESSURE: 132 MMHG | OXYGEN SATURATION: 98 % | WEIGHT: 149 LBS | RESPIRATION RATE: 12 BRPM | DIASTOLIC BLOOD PRESSURE: 70 MMHG

## 2025-07-21 DIAGNOSIS — G89.29 CHRONIC PAIN OF MULTIPLE JOINTS: ICD-10-CM

## 2025-07-21 DIAGNOSIS — M25.50 CHRONIC PAIN OF MULTIPLE JOINTS: ICD-10-CM

## 2025-07-21 DIAGNOSIS — M79.7 FIBROMYALGIA: ICD-10-CM

## 2025-07-21 DIAGNOSIS — R52 PAIN MANAGEMENT: Primary | ICD-10-CM

## 2025-07-21 DIAGNOSIS — M77.11 RIGHT LATERAL EPICONDYLITIS: ICD-10-CM

## 2025-07-21 DIAGNOSIS — M54.2 CERVICALGIA: ICD-10-CM

## 2025-07-21 DIAGNOSIS — Z79.891 ENCOUNTER FOR LONG-TERM OPIATE ANALGESIC USE: ICD-10-CM

## 2025-07-21 DIAGNOSIS — M79.18 MYOFASCIAL PAIN: ICD-10-CM

## 2025-07-21 PROCEDURE — 99215 OFFICE O/P EST HI 40 MIN: CPT | Performed by: NURSE PRACTITIONER

## 2025-07-21 PROCEDURE — 99213 OFFICE O/P EST LOW 20 MIN: CPT | Performed by: NURSE PRACTITIONER

## 2025-07-21 RX ORDER — OXYCODONE AND ACETAMINOPHEN 10; 325 MG/1; MG/1
1 TABLET ORAL EVERY 6 HOURS PRN
Qty: 120 TABLET | Refills: 0 | Status: SHIPPED | OUTPATIENT
Start: 2025-07-23 | End: 2025-08-22

## 2025-07-21 ASSESSMENT — ENCOUNTER SYMPTOMS
RESPIRATORY NEGATIVE: 1
BACK PAIN: 1

## 2025-07-21 NOTE — PROGRESS NOTES
[facility]  [address] [place]. [address]    Patient Name: Laura Jamil  MRN: [id number]  Encounter Date: 7/21/2025     SUBJECTIVE:  Laura Jamil is a 59 y.o., female being seen today regarding   Chief Complaint   Patient presents with    Neck Pain    Back Pain    Knee Pain    and routine medication management.    History of Present Illness  The patient is a 59-year-old female who presents for medication refill.    She reports that she has 2 pills left of her current medication, which she plans to take tomorrow morning. She anticipates running out of her medication by Wednesday.    Functionality Assessment & Goals:  On a scale of 0 (Does not Interfere) to 10 (Completely Interferes)  Which number describes how during the past week pain has interfered with the following:   A.  General Activity: 8    B.  Mood:  6   C.  Walking Ability:  5   D.  Normal Work (Includes both work outside the home and housework):  8   E.  Relations with Other People:  8   F.  Sleep:  10    G.  Enjoyment of Life:  6  2.  Patient prefers to Take their Pain Medications:   [x] On a regular basis   [] Only when necessary   [] Does not take pain medications  3.  What are the Patient's Goals/ Expectations for Visiting Pain Management?   [] Learn about my pain   [] Physical Therapy   [x] Receive Injections   [] Deal with Anxiety and Stress   [x] Receive Medication   [] Treat Depression    [] Treat Sleep   [] Treat Opioid Dependence/ Addiction    Current Pain Assessment:         7/21/2025     2:22 PM   AMB PAIN ASSESSMENT   Location of Pain Neck   Location Modifiers Left   Severity of Pain 7   Quality of Pain Throbbing;Sharp;Dull;Aching   Duration of Pain Persistent   Frequency of Pain Constant   Aggravating Factors Stretching;Bending;Straightening;Exercise;Kneeling;Squatting;Standing;Walking;Stairs   Limiting Behavior Yes   Relieving Factors Rest   Result of Injury No   Work-Related Injury No

## 2025-08-13 ENCOUNTER — TELEPHONE (OUTPATIENT)
Dept: PAIN MANAGEMENT | Age: 60
End: 2025-08-13

## 2025-08-13 ENCOUNTER — HOSPITAL ENCOUNTER (OUTPATIENT)
Age: 60
Setting detail: SPECIMEN
Discharge: HOME | End: 2025-08-13
Payer: MEDICARE

## 2025-08-13 ENCOUNTER — OFFICE VISIT (OUTPATIENT)
Dept: PAIN MANAGEMENT | Age: 60
End: 2025-08-13
Payer: MEDICARE

## 2025-08-13 VITALS
OXYGEN SATURATION: 99 % | DIASTOLIC BLOOD PRESSURE: 90 MMHG | WEIGHT: 148 LBS | SYSTOLIC BLOOD PRESSURE: 130 MMHG | HEART RATE: 85 BPM | BODY MASS INDEX: 26.22 KG/M2

## 2025-08-13 DIAGNOSIS — G89.29 ENCOUNTER FOR CHRONIC PAIN MANAGEMENT: ICD-10-CM

## 2025-08-13 DIAGNOSIS — M77.11 RIGHT LATERAL EPICONDYLITIS: ICD-10-CM

## 2025-08-13 DIAGNOSIS — M25.50 CHRONIC PAIN OF MULTIPLE JOINTS: Primary | ICD-10-CM

## 2025-08-13 DIAGNOSIS — Z02.83 ENCOUNTER FOR DRUG SCREENING: ICD-10-CM

## 2025-08-13 DIAGNOSIS — M54.2 CERVICALGIA: ICD-10-CM

## 2025-08-13 DIAGNOSIS — M79.18 MYOFASCIAL PAIN: ICD-10-CM

## 2025-08-13 DIAGNOSIS — G89.29 CHRONIC PAIN OF MULTIPLE JOINTS: Primary | ICD-10-CM

## 2025-08-13 DIAGNOSIS — R52 PAIN MANAGEMENT: ICD-10-CM

## 2025-08-13 DIAGNOSIS — Z79.891 ENCOUNTER FOR LONG-TERM OPIATE ANALGESIC USE: ICD-10-CM

## 2025-08-13 DIAGNOSIS — M79.7 FIBROMYALGIA: ICD-10-CM

## 2025-08-13 DIAGNOSIS — Z79.891 CHRONIC PRESCRIPTION OPIATE USE: ICD-10-CM

## 2025-08-13 PROCEDURE — G0481 DRUG TEST DEF 8-14 CLASSES: HCPCS

## 2025-08-13 PROCEDURE — 80307 DRUG TEST PRSMV CHEM ANLYZR: CPT

## 2025-08-13 PROCEDURE — 99213 OFFICE O/P EST LOW 20 MIN: CPT | Performed by: NURSE PRACTITIONER

## 2025-08-13 PROCEDURE — 99215 OFFICE O/P EST HI 40 MIN: CPT | Performed by: NURSE PRACTITIONER

## 2025-08-13 RX ORDER — OXYCODONE AND ACETAMINOPHEN 10; 325 MG/1; MG/1
1 TABLET ORAL EVERY 6 HOURS PRN
Qty: 120 TABLET | Refills: 0 | Status: SHIPPED | OUTPATIENT
Start: 2025-08-13 | End: 2025-09-12

## 2025-08-13 ASSESSMENT — ENCOUNTER SYMPTOMS
BACK PAIN: 1
RESPIRATORY NEGATIVE: 1

## 2025-08-16 LAB
6-ACETYLMORPHINE, UR: NOT DETECTED
7-AMINOCLONAZEPAM, URINE: NOT DETECTED
ALPHA-OH-ALPRAZ, URINE: NOT DETECTED
ALPHA-OH-MIDAZOLAM, URINE: NOT DETECTED
ALPRAZOLAM, URINE: NOT DETECTED
AMPHETAMINE, URINE: NOT DETECTED
BARBITURATES, URINE: NEGATIVE
BENZOYLECGONINE, UR: NEGATIVE
BUPRENORPHINE URINE: NOT DETECTED
CARISOPRODOL, URINE: NEGATIVE
CLONAZEPAM, URINE: NOT DETECTED
CODEINE, URINE: NOT DETECTED
CREAT UR-MCNC: 64.7 MG/DL (ref 20–400)
DIAZEPAM, URINE: NOT DETECTED
EER PAIN MGT DRUG PANEL, HIGH RES/EMIT U: ABNORMAL
ETHYL GLUCURONIDE UR: ABNORMAL
FENTANYL URINE: NOT DETECTED
GABAPENTIN: NOT DETECTED
HYDROCODONE, URINE: NOT DETECTED
HYDROMORPHONE, URINE: NOT DETECTED
LORAZEPAM, URINE: NOT DETECTED
MARIJUANA METAB, UR: NEGATIVE
MDA, URINE: NOT DETECTED
MDEA, EVE, UR: NOT DETECTED
MDMA, URINE: NOT DETECTED
MEPERIDINE METAB, UR: NOT DETECTED
METHADONE, URINE: NEGATIVE
METHAMPHETAMINE, URINE: NOT DETECTED
METHYLPHENIDATE: NOT DETECTED
MIDAZOLAM, URINE: NOT DETECTED
MORPHINE, OPI1M: NOT DETECTED
NALOXONE URINE: NOT DETECTED
NORBUPRENORPHINE, URINE: NOT DETECTED
NORDIAZEPAM, URINE: NOT DETECTED
NORFENTANYL, URINE: NOT DETECTED
NORHYDROCODONE, URINE: NOT DETECTED
NOROXYCODONE, URINE: PRESENT
NOROXYMORPHONE, URINE: PRESENT
OXAZEPAM, URINE: NOT DETECTED
OXYCODONE URINE: PRESENT
OXYMORPHONE, URINE: PRESENT
PAIN MGT DRUG PANEL, HI RES, UR: ABNORMAL
PCP,URINE: NEGATIVE
PHENTERMINE, UR: NOT DETECTED
PREGABALIN: NOT DETECTED
TAPENTADOL, URINE: NOT DETECTED
TAPENTADOL-O-SULFATE, URINE: NOT DETECTED
TEMAZEPAM, URINE: NOT DETECTED
TRAMADOL, URINE: NEGATIVE
ZOLPIDEM METABOLITE (ZCA), URINE: NOT DETECTED
ZOLPIDEM, URINE: NOT DETECTED

## 2025-08-25 ENCOUNTER — RESULTS FOLLOW-UP (OUTPATIENT)
Dept: PAIN MANAGEMENT | Age: 60
End: 2025-08-25